# Patient Record
Sex: MALE | Race: OTHER | Employment: OTHER | URBAN - METROPOLITAN AREA
[De-identification: names, ages, dates, MRNs, and addresses within clinical notes are randomized per-mention and may not be internally consistent; named-entity substitution may affect disease eponyms.]

---

## 2020-01-22 ENCOUNTER — HOSPITAL ENCOUNTER (INPATIENT)
Facility: HOSPITAL | Age: 85
LOS: 5 days | Discharge: HOME HEALTH | DRG: 377 | End: 2020-01-27
Attending: EMERGENCY MEDICINE | Admitting: HOSPITALIST
Payer: MEDICARE

## 2020-01-22 DIAGNOSIS — K92.2 GASTROINTESTINAL HEMORRHAGE, UNSPECIFIED GASTROINTESTINAL HEMORRHAGE TYPE: Primary | ICD-10-CM

## 2020-01-22 LAB
ANION GAP SERPL CALC-SCNC: 5 MMOL/L (ref 0–18)
ANTIBODY SCREEN: NEGATIVE
APTT PPP: 32.9 SECONDS (ref 23.2–35.3)
BASOPHILS # BLD AUTO: 0.03 X10(3) UL (ref 0–0.2)
BASOPHILS NFR BLD AUTO: 0.5 %
BUN BLD-MCNC: 32 MG/DL (ref 7–18)
BUN/CREAT SERPL: 24.2 (ref 10–20)
CALCIUM BLD-MCNC: 8.5 MG/DL (ref 8.5–10.1)
CHLORIDE SERPL-SCNC: 113 MMOL/L (ref 98–112)
CO2 SERPL-SCNC: 26 MMOL/L (ref 21–32)
CREAT BLD-MCNC: 1.32 MG/DL (ref 0.7–1.3)
DEPRECATED RDW RBC AUTO: 44.1 FL (ref 35.1–46.3)
EOSINOPHIL # BLD AUTO: 0.09 X10(3) UL (ref 0–0.7)
EOSINOPHIL NFR BLD AUTO: 1.6 %
ERYTHROCYTE [DISTWIDTH] IN BLOOD BY AUTOMATED COUNT: 12.7 % (ref 11–15)
GLUCOSE BLD-MCNC: 90 MG/DL (ref 70–99)
HCT VFR BLD AUTO: 28.1 % (ref 39–53)
HGB BLD-MCNC: 9.3 G/DL (ref 13–17.5)
IMM GRANULOCYTES # BLD AUTO: 0.01 X10(3) UL (ref 0–1)
IMM GRANULOCYTES NFR BLD: 0.2 %
INR BLD: 1.19 (ref 0.9–1.2)
LYMPHOCYTES # BLD AUTO: 1.39 X10(3) UL (ref 1–4)
LYMPHOCYTES NFR BLD AUTO: 24.7 %
MCH RBC QN AUTO: 31.2 PG (ref 26–34)
MCHC RBC AUTO-ENTMCNC: 33.1 G/DL (ref 31–37)
MCV RBC AUTO: 94.3 FL (ref 80–100)
MONOCYTES # BLD AUTO: 0.56 X10(3) UL (ref 0.1–1)
MONOCYTES NFR BLD AUTO: 9.9 %
NEUTROPHILS # BLD AUTO: 3.55 X10 (3) UL (ref 1.5–7.7)
NEUTROPHILS # BLD AUTO: 3.55 X10(3) UL (ref 1.5–7.7)
NEUTROPHILS NFR BLD AUTO: 63.1 %
OSMOLALITY SERPL CALC.SUM OF ELEC: 304 MOSM/KG (ref 275–295)
PLATELET # BLD AUTO: 208 10(3)UL (ref 150–450)
POTASSIUM SERPL-SCNC: 3.9 MMOL/L (ref 3.5–5.1)
PROTHROMBIN TIME: 15 SECONDS (ref 11.8–14.5)
RBC # BLD AUTO: 2.98 X10(6)UL (ref 3.8–5.8)
RH BLOOD TYPE: POSITIVE
SODIUM SERPL-SCNC: 144 MMOL/L (ref 136–145)
WBC # BLD AUTO: 5.6 X10(3) UL (ref 4–11)

## 2020-01-22 PROCEDURE — 99223 1ST HOSP IP/OBS HIGH 75: CPT | Performed by: HOSPITALIST

## 2020-01-22 RX ORDER — SODIUM CHLORIDE 9 MG/ML
INJECTION, SOLUTION INTRAVENOUS CONTINUOUS
Status: DISCONTINUED | OUTPATIENT
Start: 2020-01-22 | End: 2020-01-24

## 2020-01-22 RX ORDER — ONDANSETRON 2 MG/ML
4 INJECTION INTRAMUSCULAR; INTRAVENOUS EVERY 6 HOURS PRN
Status: DISCONTINUED | OUTPATIENT
Start: 2020-01-22 | End: 2020-01-27

## 2020-01-22 RX ORDER — ZOLPIDEM TARTRATE 10 MG/1
10 TABLET ORAL NIGHTLY PRN
Status: DISCONTINUED | OUTPATIENT
Start: 2020-01-22 | End: 2020-01-22 | Stop reason: DRUGHIGH

## 2020-01-22 RX ORDER — SODIUM CHLORIDE 0.9 % (FLUSH) 0.9 %
3 SYRINGE (ML) INJECTION AS NEEDED
Status: DISCONTINUED | OUTPATIENT
Start: 2020-01-22 | End: 2020-01-27

## 2020-01-22 RX ORDER — LOSARTAN POTASSIUM 100 MG/1
100 TABLET ORAL DAILY
Status: ON HOLD | COMMUNITY
End: 2020-01-30

## 2020-01-22 RX ORDER — ACETAMINOPHEN 325 MG/1
650 TABLET ORAL EVERY 6 HOURS PRN
Status: DISCONTINUED | OUTPATIENT
Start: 2020-01-22 | End: 2020-01-27

## 2020-01-22 RX ORDER — ALPRAZOLAM 0.5 MG/1
0.5 TABLET ORAL 3 TIMES DAILY PRN
Status: DISCONTINUED | OUTPATIENT
Start: 2020-01-22 | End: 2020-01-27

## 2020-01-22 RX ORDER — AMLODIPINE BESYLATE 5 MG/1
5 TABLET ORAL DAILY
COMMUNITY

## 2020-01-22 RX ORDER — ATORVASTATIN CALCIUM 10 MG/1
10 TABLET, FILM COATED ORAL DAILY
COMMUNITY

## 2020-01-22 RX ORDER — ZOLPIDEM TARTRATE 5 MG/1
5 TABLET ORAL NIGHTLY PRN
Status: DISCONTINUED | OUTPATIENT
Start: 2020-01-22 | End: 2020-01-27

## 2020-01-22 NOTE — ED INITIAL ASSESSMENT (HPI)
Patient was at [facility] last week. Patient was bleeding from rectum and was seen at the hospital there. Colonoscopy was performed but no definite conclusion. Patient weak, dizziness and lost of appetite.

## 2020-01-23 LAB
ALBUMIN SERPL-MCNC: 2.6 G/DL (ref 3.4–5)
ALBUMIN/GLOB SERPL: 0.9 {RATIO} (ref 1–2)
ALP LIVER SERPL-CCNC: 53 U/L (ref 45–117)
ALT SERPL-CCNC: 14 U/L (ref 16–61)
ANION GAP SERPL CALC-SCNC: 4 MMOL/L (ref 0–18)
AST SERPL-CCNC: 17 U/L (ref 15–37)
BASOPHILS # BLD AUTO: 0.02 X10(3) UL (ref 0–0.2)
BASOPHILS NFR BLD AUTO: 0.4 %
BILIRUB SERPL-MCNC: 0.3 MG/DL (ref 0.1–2)
BUN BLD-MCNC: 27 MG/DL (ref 7–18)
BUN/CREAT SERPL: 24.5 (ref 10–20)
C DIFF TOX B STL QL: NEGATIVE
CALCIUM BLD-MCNC: 7.9 MG/DL (ref 8.5–10.1)
CHLORIDE SERPL-SCNC: 116 MMOL/L (ref 98–112)
CO2 SERPL-SCNC: 26 MMOL/L (ref 21–32)
CREAT BLD-MCNC: 1.1 MG/DL (ref 0.7–1.3)
DEPRECATED HBV CORE AB SER IA-ACNC: 27.6 NG/ML (ref 30–530)
DEPRECATED RDW RBC AUTO: 44.6 FL (ref 35.1–46.3)
EOSINOPHIL # BLD AUTO: 0.05 X10(3) UL (ref 0–0.7)
EOSINOPHIL NFR BLD AUTO: 1.1 %
ERYTHROCYTE [DISTWIDTH] IN BLOOD BY AUTOMATED COUNT: 12.8 % (ref 11–15)
GLOBULIN PLAS-MCNC: 2.8 G/DL (ref 2.8–4.4)
GLUCOSE BLD-MCNC: 95 MG/DL (ref 70–99)
HCT VFR BLD AUTO: 23.9 % (ref 39–53)
HCT VFR BLD AUTO: 26.3 % (ref 39–53)
HGB BLD-MCNC: 7.3 G/DL (ref 13–17.5)
HGB BLD-MCNC: 7.9 G/DL (ref 13–17.5)
HGB BLD-MCNC: 8.9 G/DL (ref 13–17.5)
IMM GRANULOCYTES # BLD AUTO: 0.01 X10(3) UL (ref 0–1)
IMM GRANULOCYTES NFR BLD: 0.2 %
IRON SATURATION: 12 % (ref 20–50)
IRON SERPL-MCNC: 33 UG/DL (ref 65–175)
LYMPHOCYTES # BLD AUTO: 0.84 X10(3) UL (ref 1–4)
LYMPHOCYTES NFR BLD AUTO: 18.9 %
M PROTEIN MFR SERPL ELPH: 5.4 G/DL (ref 6.4–8.2)
MCH RBC QN AUTO: 31.6 PG (ref 26–34)
MCHC RBC AUTO-ENTMCNC: 33.1 G/DL (ref 31–37)
MCV RBC AUTO: 95.6 FL (ref 80–100)
MONOCYTES # BLD AUTO: 0.3 X10(3) UL (ref 0.1–1)
MONOCYTES NFR BLD AUTO: 6.7 %
NEUTROPHILS # BLD AUTO: 3.23 X10 (3) UL (ref 1.5–7.7)
NEUTROPHILS # BLD AUTO: 3.23 X10(3) UL (ref 1.5–7.7)
NEUTROPHILS NFR BLD AUTO: 72.7 %
OSMOLALITY SERPL CALC.SUM OF ELEC: 307 MOSM/KG (ref 275–295)
PLATELET # BLD AUTO: 163 10(3)UL (ref 150–450)
POTASSIUM SERPL-SCNC: 4.1 MMOL/L (ref 3.5–5.1)
RBC # BLD AUTO: 2.5 X10(6)UL (ref 3.8–5.8)
SODIUM SERPL-SCNC: 146 MMOL/L (ref 136–145)
TOTAL IRON BINDING CAPACITY: 268 UG/DL (ref 240–450)
TRANSFERRIN SERPL-MCNC: 180 MG/DL (ref 200–360)
WBC # BLD AUTO: 4.5 X10(3) UL (ref 4–11)

## 2020-01-23 PROCEDURE — 99222 1ST HOSP IP/OBS MODERATE 55: CPT | Performed by: INTERNAL MEDICINE

## 2020-01-23 PROCEDURE — 99233 SBSQ HOSP IP/OBS HIGH 50: CPT | Performed by: HOSPITALIST

## 2020-01-23 RX ORDER — AMLODIPINE BESYLATE 5 MG/1
5 TABLET ORAL DAILY
Status: DISCONTINUED | OUTPATIENT
Start: 2020-01-23 | End: 2020-01-27

## 2020-01-23 RX ORDER — TAMSULOSIN HYDROCHLORIDE 0.4 MG/1
0.4 CAPSULE ORAL DAILY
Status: DISCONTINUED | OUTPATIENT
Start: 2020-01-23 | End: 2020-01-27

## 2020-01-23 RX ORDER — SODIUM CHLORIDE 9 MG/ML
INJECTION, SOLUTION INTRAVENOUS ONCE
Status: COMPLETED | OUTPATIENT
Start: 2020-01-23 | End: 2020-01-23

## 2020-01-23 NOTE — ED NOTES
The patient is alert and oriented X 4, Armenian speaking, ambulatory but lightheaded and weak. Family remains at bedside. Patient will likely have a colonoscopy in the morning and is NPO at this time.  Orders for admission received,  patient is aware of plan

## 2020-01-23 NOTE — PROGRESS NOTES
[facility]    Progress Note    [place] Patient Status:  Inpatient    1935 MRN [id number]   Location [facility] 5SW/SE Attending Antonio Bolanos,*   Hosp Day # 1 PCP MD Alysia Garner and counseling pt and with his permission his family in room who translated for me about care        Results:     Lab Results   Component Value Date    WBC 4.5 01/23/2020    HGB 7.9 (L) 01/23/2020    HCT 23.9 (L) 01/23/2020    .0 01/23/2020    CREAT

## 2020-01-23 NOTE — PLAN OF CARE
Problem: Patient Centered Care  Goal: Patient preferences are identified and integrated in the patient's plan of care  Description  Interventions:  - What would you like us to know as we care for you?  Npo    - Provide timely, complete, and accurate infor

## 2020-01-23 NOTE — ED PROVIDER NOTES
Patient Seen in: [facility] AND [facility] Emergency Department      History   Patient presents with:  GI Bleeding    Stated Complaint: not feeling well    HPI    Patient is an 47-year-old Syriac-speaking male who presents with bright red blood per rectum x1 mo PERRL  Neck: supple, non tender  CV: RRR, no murmurs  Resp: CTAB, no wheezes or retractions  Ab: soft, nontender, no distension  Rectal: +red blood at rectum. Hemoccult positive.  No hemorrhoids or fissures  Extremities: FROM of all extremities, no cyanosis individual orders. ABORH (BLOOD TYPE)   ANTIBODY SCREEN   RAINBOW DRAW BLUE   RAINBOW DRAW LAVENDER   RAINBOW DRAW LIGHT GREEN   RAINBOW DRAW GOLD     EKG    Rate, intervals and axes as noted on EKG Report.   Rate: 76  Rhythm: Sinus Rhythm  Reading: no ST

## 2020-01-23 NOTE — H&P
[facility]    PATIENT'S NAME: Almaz Horton CEDRIC STOKES   ATTENDING PHYSICIAN: Addis Galarza MD   PATIENT ACCOUNT#:   [id number]    LOCATION:  [place] [address]  MEDICAL RECORD #:   [id number]       YOB: 1935  ADMISSION DATE:       0 systems negative. PHYSICAL EXAMINATION:    GENERAL:  Alert and oriented to time, place, and person. No acute distress. VITAL SIGNS:  Temperature 97.6, pulse 75, respiratory rate 20, blood pressure 138/98. Pulse ox 99% on room air.   HEENT:  Atraumat

## 2020-01-23 NOTE — CONSULTS
[address]   Gastroenterology Consultation Note     [place]  Patient Status:    Inpatient  Date of Admission:         1/22/2020, Hospital day #1  Attending:   Jhonny Dinh  PCP:     Marguerite Omalley MD    Re polyethylene glycol (GOLYTELY) solution 2,000 mL, 2,000 mL, Oral, Q10H  •  Normal Saline Flush 0.9 % injection 3 mL, 3 mL, Intravenous, PRN  •  0.9% NaCl infusion, , Intravenous, Continuous  •  acetaminophen (TYLENOL) tab 650 mg, 650 mg, Oral, Q6H PRN  • radiation seed placement who was admitted 1/22 after presenting to the ED with blood per rectum    Uncertain source of bleeding especially with his uncertain history of recent colonoscopy in [facility] in November.  Possibly malignancy, hemorrhoids, radiation p to prolonged hospital stay or surgical intervention. All questions were answered to the patient’s satisfaction. The patient elected to proceed with EGD with intervention [i.e. Biopsy, dilatation, control of bleeding, etc.] as indicated.     Discussed with eva

## 2020-01-24 ENCOUNTER — TELEPHONE (OUTPATIENT)
Dept: GASTROENTEROLOGY | Facility: CLINIC | Age: 85
End: 2020-01-24

## 2020-01-24 ENCOUNTER — ANESTHESIA EVENT (OUTPATIENT)
Dept: ENDOSCOPY | Facility: HOSPITAL | Age: 85
DRG: 377 | End: 2020-01-24
Payer: MEDICARE

## 2020-01-24 ENCOUNTER — MED REC SCAN ONLY (OUTPATIENT)
Dept: GASTROENTEROLOGY | Facility: CLINIC | Age: 85
End: 2020-01-24

## 2020-01-24 ENCOUNTER — ANESTHESIA (OUTPATIENT)
Dept: ENDOSCOPY | Facility: HOSPITAL | Age: 85
DRG: 377 | End: 2020-01-24
Payer: MEDICARE

## 2020-01-24 LAB
ANION GAP SERPL CALC-SCNC: 7 MMOL/L (ref 0–18)
BASOPHILS # BLD AUTO: 0.01 X10(3) UL (ref 0–0.2)
BASOPHILS # BLD AUTO: 0.02 X10(3) UL (ref 0–0.2)
BASOPHILS NFR BLD AUTO: 0.2 %
BASOPHILS NFR BLD AUTO: 0.4 %
BUN BLD-MCNC: 22 MG/DL (ref 7–18)
BUN/CREAT SERPL: 16.7 (ref 10–20)
CALCIUM BLD-MCNC: 7.9 MG/DL (ref 8.5–10.1)
CHLORIDE SERPL-SCNC: 115 MMOL/L (ref 98–112)
CO2 SERPL-SCNC: 24 MMOL/L (ref 21–32)
CREAT BLD-MCNC: 1.32 MG/DL (ref 0.7–1.3)
DEPRECATED RDW RBC AUTO: 43.6 FL (ref 35.1–46.3)
DEPRECATED RDW RBC AUTO: 43.8 FL (ref 35.1–46.3)
EOSINOPHIL # BLD AUTO: 0.03 X10(3) UL (ref 0–0.7)
EOSINOPHIL # BLD AUTO: 0.04 X10(3) UL (ref 0–0.7)
EOSINOPHIL NFR BLD AUTO: 0.6 %
EOSINOPHIL NFR BLD AUTO: 0.8 %
ERYTHROCYTE [DISTWIDTH] IN BLOOD BY AUTOMATED COUNT: 13 % (ref 11–15)
ERYTHROCYTE [DISTWIDTH] IN BLOOD BY AUTOMATED COUNT: 13 % (ref 11–15)
GLUCOSE BLD-MCNC: 110 MG/DL (ref 70–99)
HAV IGM SER QL: 1.8 MG/DL (ref 1.6–2.6)
HCT VFR BLD AUTO: 21.8 % (ref 39–53)
HCT VFR BLD AUTO: 23.5 % (ref 39–53)
HGB BLD-MCNC: 7.3 G/DL (ref 13–17.5)
HGB BLD-MCNC: 7.4 G/DL (ref 13–17.5)
HGB BLD-MCNC: 8.1 G/DL (ref 13–17.5)
IMM GRANULOCYTES # BLD AUTO: 0.01 X10(3) UL (ref 0–1)
IMM GRANULOCYTES # BLD AUTO: 0.01 X10(3) UL (ref 0–1)
IMM GRANULOCYTES NFR BLD: 0.2 %
IMM GRANULOCYTES NFR BLD: 0.2 %
LYMPHOCYTES # BLD AUTO: 0.93 X10(3) UL (ref 1–4)
LYMPHOCYTES # BLD AUTO: 1.01 X10(3) UL (ref 1–4)
LYMPHOCYTES NFR BLD AUTO: 18.5 %
LYMPHOCYTES NFR BLD AUTO: 20.7 %
MCH RBC QN AUTO: 31.2 PG (ref 26–34)
MCH RBC QN AUTO: 31.6 PG (ref 26–34)
MCHC RBC AUTO-ENTMCNC: 33.9 G/DL (ref 31–37)
MCHC RBC AUTO-ENTMCNC: 34.5 G/DL (ref 31–37)
MCV RBC AUTO: 91.8 FL (ref 80–100)
MCV RBC AUTO: 92 FL (ref 80–100)
MONOCYTES # BLD AUTO: 0.39 X10(3) UL (ref 0.1–1)
MONOCYTES # BLD AUTO: 0.52 X10(3) UL (ref 0.1–1)
MONOCYTES NFR BLD AUTO: 10.6 %
MONOCYTES NFR BLD AUTO: 7.8 %
NEUTROPHILS # BLD AUTO: 3.3 X10 (3) UL (ref 1.5–7.7)
NEUTROPHILS # BLD AUTO: 3.3 X10(3) UL (ref 1.5–7.7)
NEUTROPHILS # BLD AUTO: 3.64 X10 (3) UL (ref 1.5–7.7)
NEUTROPHILS # BLD AUTO: 3.64 X10(3) UL (ref 1.5–7.7)
NEUTROPHILS NFR BLD AUTO: 67.5 %
NEUTROPHILS NFR BLD AUTO: 72.5 %
OSMOLALITY SERPL CALC.SUM OF ELEC: 306 MOSM/KG (ref 275–295)
PHOSPHATE SERPL-MCNC: 1.8 MG/DL (ref 2.5–4.9)
PLATELET # BLD AUTO: 159 10(3)UL (ref 150–450)
PLATELET # BLD AUTO: 165 10(3)UL (ref 150–450)
POTASSIUM SERPL-SCNC: 3.5 MMOL/L (ref 3.5–5.1)
RBC # BLD AUTO: 2.37 X10(6)UL (ref 3.8–5.8)
RBC # BLD AUTO: 2.56 X10(6)UL (ref 3.8–5.8)
SODIUM SERPL-SCNC: 146 MMOL/L (ref 136–145)
WBC # BLD AUTO: 4.9 X10(3) UL (ref 4–11)
WBC # BLD AUTO: 5 X10(3) UL (ref 4–11)

## 2020-01-24 PROCEDURE — 0DBP8ZX EXCISION OF RECTUM, VIA NATURAL OR ARTIFICIAL OPENING ENDOSCOPIC, DIAGNOSTIC: ICD-10-PCS | Performed by: INTERNAL MEDICINE

## 2020-01-24 PROCEDURE — 99233 SBSQ HOSP IP/OBS HIGH 50: CPT | Performed by: HOSPITALIST

## 2020-01-24 PROCEDURE — 45385 COLONOSCOPY W/LESION REMOVAL: CPT | Performed by: INTERNAL MEDICINE

## 2020-01-24 PROCEDURE — 0DB68ZX EXCISION OF STOMACH, VIA NATURAL OR ARTIFICIAL OPENING ENDOSCOPIC, DIAGNOSTIC: ICD-10-PCS | Performed by: INTERNAL MEDICINE

## 2020-01-24 PROCEDURE — 43239 EGD BIOPSY SINGLE/MULTIPLE: CPT | Performed by: INTERNAL MEDICINE

## 2020-01-24 RX ORDER — NALOXONE HYDROCHLORIDE 0.4 MG/ML
80 INJECTION, SOLUTION INTRAMUSCULAR; INTRAVENOUS; SUBCUTANEOUS AS NEEDED
Status: DISCONTINUED | OUTPATIENT
Start: 2020-01-24 | End: 2020-01-24 | Stop reason: HOSPADM

## 2020-01-24 RX ORDER — SODIUM CHLORIDE, SODIUM LACTATE, POTASSIUM CHLORIDE, CALCIUM CHLORIDE 600; 310; 30; 20 MG/100ML; MG/100ML; MG/100ML; MG/100ML
INJECTION, SOLUTION INTRAVENOUS CONTINUOUS
Status: DISCONTINUED | OUTPATIENT
Start: 2020-01-24 | End: 2020-01-24

## 2020-01-24 RX ORDER — LIDOCAINE HYDROCHLORIDE 10 MG/ML
INJECTION, SOLUTION EPIDURAL; INFILTRATION; INTRACAUDAL; PERINEURAL AS NEEDED
Status: DISCONTINUED | OUTPATIENT
Start: 2020-01-24 | End: 2020-01-24 | Stop reason: SURG

## 2020-01-24 RX ORDER — SODIUM CHLORIDE, SODIUM LACTATE, POTASSIUM CHLORIDE, CALCIUM CHLORIDE 600; 310; 30; 20 MG/100ML; MG/100ML; MG/100ML; MG/100ML
INJECTION, SOLUTION INTRAVENOUS CONTINUOUS PRN
Status: DISCONTINUED | OUTPATIENT
Start: 2020-01-24 | End: 2020-01-24

## 2020-01-24 RX ORDER — MAGNESIUM OXIDE 400 MG (241.3 MG MAGNESIUM) TABLET
400 TABLET ONCE
Status: COMPLETED | OUTPATIENT
Start: 2020-01-24 | End: 2020-01-24

## 2020-01-24 RX ADMIN — SODIUM CHLORIDE: 9 INJECTION, SOLUTION INTRAVENOUS at 09:06:00

## 2020-01-24 RX ADMIN — LIDOCAINE HYDROCHLORIDE 80 MG: 10 INJECTION, SOLUTION EPIDURAL; INFILTRATION; INTRACAUDAL; PERINEURAL at 08:23:00

## 2020-01-24 NOTE — PLAN OF CARE
Patient admitted for GI bleed. Patient scheduled for colonoscopy this morning. Patient had 2000ml of Golytely last night and finished 1000ml this am. Patient states his stomach feels full and he cannot drink anymore.  Patient had small BM with some pink/red

## 2020-01-24 NOTE — TELEPHONE ENCOUNTER
GI staff:    Patient hospitalized but likely going home today    Can call next week to schedule follow up in approximately 4-6 weeks    Thank you    Jesus Reyes MD  [facility] - Gastroenterology  1/24/2020  9:38 AM

## 2020-01-24 NOTE — TELEPHONE ENCOUNTER
Pt contacted in hospital room (ext [phone]) and spoke to his daughter, Geri Ramey (OK per pt consent) and reviewed Dr. Russell Wagoner message below.  She accepted the following appt, directions provided to [facility], and told to arrive 15 mins earlier:  Future Appointments   Date

## 2020-01-24 NOTE — PLAN OF CARE
Problem: Patient Centered Care  Goal: Patient preferences are identified and integrated in the patient's plan of care  Description  Interventions:  - What would you like us to know as we care for you?   - Provide timely, complete, and accurate informatio appropriate  Outcome: Progressing     Problem: RISK FOR INFECTION - ADULT  Goal: Absence of fever/infection during anticipated neutropenic period  Description  INTERVENTIONS  - Monitor WBC  - Administer growth factors as ordered  - Implement neutropenic gu care  Description  Interventions:  - Assess communication ability and preferred communication style  - Implement communication aides and strategies  - Use visual cues when possible  - Listen attentively, be patient, do not interrupt  - Minimize distraction

## 2020-01-24 NOTE — PROGRESS NOTES
[facility]    Progress Note    [place] Patient Status:  Inpatient    1935 MRN [id number]   Location [facility] 5SW/SE Attending Shalom Duke,*   Hosp Day # 2 PCP MD Argelia Blanco counseling pt and with his permission his family in room who translated for me about care          Results:     Lab Results   Component Value Date    WBC 5.0 01/24/2020    HGB 7.3 (L) 01/24/2020    HCT 23.5 (L) 01/24/2020    .0 01/24/2020    CREATSE

## 2020-01-24 NOTE — INTERVAL H&P NOTE
Pre-op Diagnosis: rectal bleeding    The above referenced H&P was reviewed by Emily Gonzalez MD on 1/24/2020, the patient was examined and no significant changes have occurred in the patient's condition since the H&P was performed.   I discussed with sri

## 2020-01-24 NOTE — CM/SW NOTE
Per nsg rounds pt may benefit from Inocencia Thrasher. Ref to [facility], Marce will eval    / to remain available for support and/or discharge planning.      Muriel Downing, RN    Ext [phone]

## 2020-01-24 NOTE — PROGRESS NOTES
Patient expressed that he is comfortable with upcoming procedure.  provided active listening and spiritual care. No additional follow up is needed.        01/23/20 4757   Clinical Encounter Type   Visited With Patient and family together   Routine

## 2020-01-24 NOTE — DIETARY NOTE
ADULT NUTRITION INITIAL ASSESSMENT    Pt is at high nutrition risk. Pt meets severe malnutrition criteria.       CRITERIA FOR MALNUTRITION DIAGNOSIS:  Criteria for severe malnutrition diagnosis: acute illness/injury related to wt loss greater than 5% in 1 PAST MEDICAL HISTORY:   Past Medical History:   Diagnosis Date   • Aortic stenosis    • Hypertension    • Lipid screening 4/29/09   • Prostate cancer [facility])    • Renal stones    • Screening PSA (prostate specific antigen) 5/9/09     ANTHROPOMETRICS:  HT: 18 reviewed.     NUTRITION PRESCRIPTION:  Diet: Full Liquid  Oral Supplements: Ensure Enlive BID  ESTIMATED NUTRITION NEEDS:  Calories: 1910-7626 calories/day (25-27 calories per kg Admit wt)  Protein: 82-98 grams protein/day (1.-1.2 grams protein per kg Admit

## 2020-01-24 NOTE — ANESTHESIA POSTPROCEDURE EVALUATION
Patient: Leona    Procedure Summary     Date:  01/24/20 Room / Location:  [address] [place] [address] ENDOSCOPY 05 / [address] [place] [address] ENDOSCOPY    Anesthesia Start:  0820 Anesthesia Stop:  1322    Procedures:       COLONOSCOPY (N/A )      ESOPHAGOGASTRODUODENOSCOPY (EGD) (N/A

## 2020-01-24 NOTE — ANESTHESIA PREPROCEDURE EVALUATION
Anesthesia PreOp Note    HPI:     Chrissy King is a 82year old male who presents for preoperative consultation requested by: Karma Mcwilliams MD    Date of Surgery: 1/22/2020 - 1/24/2020    Procedure(s):  COLONOSCOPY  ESOPHAGOGASTRODUODENOSCOP 40 mEq in sodium chloride 0.9% 250 mL IVPB, 40 mEq, Intravenous, Once, Jovan Flynn MD  potassium & sodium phosphates (NEUTRA-PHOS) 280-160-250 MG powder packet 1 packet, 1 packet, Oral, TID CC, Rina, Romain Remy MD  amLODIPine Besylate Alcohol use: No        Alcohol/week: 0.0 standard drinks      Drug use: No        Comment: No history of illicit substance abuse      Sexual activity: Not on file    Lifestyle      Physical activity:        Days per week: Not on file        Minutes per ses °F (36.5 °C)    TempSrc:  Oral Oral    SpO2:  95% 99% 94%   Weight:    81.8 kg (180 lb 5.4 oz)   Height:    1.829 m (6')        Anesthesia Evaluation     Patient summary reviewed and Nursing notes reviewed    History of anesthetic complications   Airway

## 2020-01-24 NOTE — OPERATIVE REPORT
Esophagogastroduodenoscopy (EGD) & Colonoscopy Report    [place]     1935 Age 82year old   PCP Melissa Blanc MD Endoscopist Mariam Ceballos MD     Date of procedure: 20    Procedure: EGD w/ biopsy & Colonoscopy w/ biopsy for insufflation). The cecum was identified by localizing the trifold, the appendix and the ileocecal valve. Withdrawal was begun with thorough washing and careful examination of the colonic walls and folds.  The ascending colon was viewed twice in the forw hospitalization for GI bleeding. There was a large 4-5 cm laterally spreading polyp in the rectum which did not appear to be highly suggestive of malignancy. There may have been a more central flatter part to it but did not appeared hard or ulcerated.  It w

## 2020-01-25 ENCOUNTER — APPOINTMENT (OUTPATIENT)
Dept: CV DIAGNOSTICS | Facility: HOSPITAL | Age: 85
DRG: 377 | End: 2020-01-25
Attending: HOSPITALIST
Payer: MEDICARE

## 2020-01-25 ENCOUNTER — APPOINTMENT (OUTPATIENT)
Dept: NUCLEAR MEDICINE | Facility: HOSPITAL | Age: 85
DRG: 377 | End: 2020-01-25
Attending: INTERNAL MEDICINE
Payer: MEDICARE

## 2020-01-25 LAB
ANION GAP SERPL CALC-SCNC: 6 MMOL/L (ref 0–18)
BASOPHILS # BLD AUTO: 0.01 X10(3) UL (ref 0–0.2)
BASOPHILS NFR BLD AUTO: 0.2 %
BLOOD TYPE BARCODE: 7300
BUN BLD-MCNC: 13 MG/DL (ref 7–18)
BUN/CREAT SERPL: 12.7 (ref 10–20)
CALCIUM BLD-MCNC: 8.2 MG/DL (ref 8.5–10.1)
CHLORIDE SERPL-SCNC: 114 MMOL/L (ref 98–112)
CO2 SERPL-SCNC: 25 MMOL/L (ref 21–32)
CREAT BLD-MCNC: 1.02 MG/DL (ref 0.7–1.3)
DEPRECATED RDW RBC AUTO: 43.6 FL (ref 35.1–46.3)
EOSINOPHIL # BLD AUTO: 0.03 X10(3) UL (ref 0–0.7)
EOSINOPHIL NFR BLD AUTO: 0.6 %
ERYTHROCYTE [DISTWIDTH] IN BLOOD BY AUTOMATED COUNT: 13 % (ref 11–15)
GLUCOSE BLD-MCNC: 117 MG/DL (ref 70–99)
HAV IGM SER QL: 1.8 MG/DL (ref 1.6–2.6)
HCT VFR BLD AUTO: 23.1 % (ref 39–53)
HGB BLD-MCNC: 6.7 G/DL (ref 13–17.5)
HGB BLD-MCNC: 8.1 G/DL (ref 13–17.5)
HGB BLD-MCNC: 8.1 G/DL (ref 13–17.5)
IMM GRANULOCYTES # BLD AUTO: 0.02 X10(3) UL (ref 0–1)
IMM GRANULOCYTES NFR BLD: 0.4 %
LYMPHOCYTES # BLD AUTO: 0.77 X10(3) UL (ref 1–4)
LYMPHOCYTES NFR BLD AUTO: 14.7 %
MCH RBC QN AUTO: 32 PG (ref 26–34)
MCHC RBC AUTO-ENTMCNC: 35.1 G/DL (ref 31–37)
MCV RBC AUTO: 91.3 FL (ref 80–100)
MONOCYTES # BLD AUTO: 0.48 X10(3) UL (ref 0.1–1)
MONOCYTES NFR BLD AUTO: 9.1 %
NEUTROPHILS # BLD AUTO: 3.94 X10 (3) UL (ref 1.5–7.7)
NEUTROPHILS # BLD AUTO: 3.94 X10(3) UL (ref 1.5–7.7)
NEUTROPHILS NFR BLD AUTO: 75 %
OSMOLALITY SERPL CALC.SUM OF ELEC: 301 MOSM/KG (ref 275–295)
PHOSPHATE SERPL-MCNC: 2.6 MG/DL (ref 2.5–4.9)
PLATELET # BLD AUTO: 160 10(3)UL (ref 150–450)
POTASSIUM SERPL-SCNC: 3.3 MMOL/L (ref 3.5–5.1)
POTASSIUM SERPL-SCNC: 3.9 MMOL/L (ref 3.5–5.1)
RBC # BLD AUTO: 2.53 X10(6)UL (ref 3.8–5.8)
SODIUM SERPL-SCNC: 145 MMOL/L (ref 136–145)
WBC # BLD AUTO: 5.3 X10(3) UL (ref 4–11)

## 2020-01-25 PROCEDURE — 30233N1 TRANSFUSION OF NONAUTOLOGOUS RED BLOOD CELLS INTO PERIPHERAL VEIN, PERCUTANEOUS APPROACH: ICD-10-PCS | Performed by: HOSPITALIST

## 2020-01-25 PROCEDURE — 99232 SBSQ HOSP IP/OBS MODERATE 35: CPT | Performed by: INTERNAL MEDICINE

## 2020-01-25 PROCEDURE — 78278 ACUTE GI BLOOD LOSS IMAGING: CPT | Performed by: INTERNAL MEDICINE

## 2020-01-25 PROCEDURE — 93306 TTE W/DOPPLER COMPLETE: CPT | Performed by: HOSPITALIST

## 2020-01-25 PROCEDURE — 99233 SBSQ HOSP IP/OBS HIGH 50: CPT | Performed by: HOSPITALIST

## 2020-01-25 RX ORDER — SODIUM CHLORIDE 9 MG/ML
INJECTION, SOLUTION INTRAVENOUS ONCE
Status: COMPLETED | OUTPATIENT
Start: 2020-01-25 | End: 2020-01-25

## 2020-01-25 RX ORDER — POTASSIUM CHLORIDE 20 MEQ/1
40 TABLET, EXTENDED RELEASE ORAL EVERY 4 HOURS
Status: COMPLETED | OUTPATIENT
Start: 2020-01-25 | End: 2020-01-25

## 2020-01-25 RX ORDER — MAGNESIUM OXIDE 400 MG (241.3 MG MAGNESIUM) TABLET
400 TABLET ONCE
Status: COMPLETED | OUTPATIENT
Start: 2020-01-25 | End: 2020-01-25

## 2020-01-25 NOTE — PROGRESS NOTES
[facility] [address]     Gastroenterology Progress Note    Zulema Barrientos Patient Status:  Inpatient    1935 MRN [id number]   Location [facility] 5SW/SE Attending Inna Flynn Day # 3 PCP MAREN JENSEN rate and rhythm   Lung- Clear to auscultation bilaterally  Abdomen-Non-distended. Bowel sounds are present. There is no tenderness to palpation. There are no masses appreciated. Liver and spleen are not palpable. Skin- No jaundice. Warm and dry.   Ext

## 2020-01-25 NOTE — PROGRESS NOTES
[facility]    Progress Note    [place] Patient Status:  Inpatient    1935 MRN [id number]   Location [facility] 5SW/SE Attending Lily Robledo,*   Hosp Day # 3 PCP MD Refugio Rivera care           Results:     Lab Results   Component Value Date    WBC 5.3 01/25/2020    HGB 8.1 (L) 01/25/2020    HCT 23.1 (L) 01/25/2020    .0 01/25/2020    CREATSERUM 1.02 01/25/2020    BUN 13 01/25/2020     01/25/2020    K 3.3 (L) 01/25/202

## 2020-01-25 NOTE — PLAN OF CARE
Problem: Patient Centered Care  Goal: Patient preferences are identified and integrated in the patient's plan of care  Description  Interventions:  - What would you like us to know as we care for you? I speak [place] Islander.   - Provide timely, complete, and acc influences on pain and pain management  - Manage/alleviate anxiety  - Utilize distraction and/or relaxation techniques  - Monitor for opioid side effects  - Notify MD/LIP if interventions unsuccessful or patient reports new pain  - Anticipate increased kostas post-hospital services based on physician/LIP order or complex needs related to functional status, cognitive ability or social support system  Outcome: Progressing     Problem: Altered Communication/Language Barrier  Goal: Patient/Family is able to [employer]

## 2020-01-25 NOTE — PLAN OF CARE
Problem: Patient Centered Care  Goal: Patient preferences are identified and integrated in the patient's plan of care  Description  Interventions:  - What would you like us to know as we care for you?  I speak Irish  - Provide timely, complete, and accu on pain and pain management  - Manage/alleviate anxiety  - Utilize distraction and/or relaxation techniques  - Monitor for opioid side effects  - Notify MD/LIP if interventions unsuccessful or patient reports new pain  - Anticipate increased pain with acti services based on physician/LIP order or complex needs related to functional status, cognitive ability or social support system  Outcome: Progressing     Problem: Altered Communication/Language Barrier  Goal: Patient/Family is able to understand and partic

## 2020-01-26 LAB
ANION GAP SERPL CALC-SCNC: 4 MMOL/L (ref 0–18)
BASOPHILS # BLD AUTO: 0.02 X10(3) UL (ref 0–0.2)
BASOPHILS NFR BLD AUTO: 0.4 %
BLOOD TYPE BARCODE: 7300
BUN BLD-MCNC: 14 MG/DL (ref 7–18)
BUN/CREAT SERPL: 13.9 (ref 10–20)
CALCIUM BLD-MCNC: 8.1 MG/DL (ref 8.5–10.1)
CHLORIDE SERPL-SCNC: 115 MMOL/L (ref 98–112)
CO2 SERPL-SCNC: 26 MMOL/L (ref 21–32)
CREAT BLD-MCNC: 1.01 MG/DL (ref 0.7–1.3)
DEPRECATED RDW RBC AUTO: 44.6 FL (ref 35.1–46.3)
EOSINOPHIL # BLD AUTO: 0.08 X10(3) UL (ref 0–0.7)
EOSINOPHIL NFR BLD AUTO: 1.5 %
ERYTHROCYTE [DISTWIDTH] IN BLOOD BY AUTOMATED COUNT: 13.3 % (ref 11–15)
GLUCOSE BLD-MCNC: 103 MG/DL (ref 70–99)
HAV IGM SER QL: 1.8 MG/DL (ref 1.6–2.6)
HCT VFR BLD AUTO: 21.9 % (ref 39–53)
HGB BLD-MCNC: 7.4 G/DL (ref 13–17.5)
HGB BLD-MCNC: 7.9 G/DL (ref 13–17.5)
IMM GRANULOCYTES # BLD AUTO: 0.02 X10(3) UL (ref 0–1)
IMM GRANULOCYTES NFR BLD: 0.4 %
LYMPHOCYTES # BLD AUTO: 0.97 X10(3) UL (ref 1–4)
LYMPHOCYTES NFR BLD AUTO: 18.2 %
MCH RBC QN AUTO: 31 PG (ref 26–34)
MCHC RBC AUTO-ENTMCNC: 33.8 G/DL (ref 31–37)
MCV RBC AUTO: 91.6 FL (ref 80–100)
MONOCYTES # BLD AUTO: 0.64 X10(3) UL (ref 0.1–1)
MONOCYTES NFR BLD AUTO: 12 %
NEUTROPHILS # BLD AUTO: 3.6 X10 (3) UL (ref 1.5–7.7)
NEUTROPHILS # BLD AUTO: 3.6 X10(3) UL (ref 1.5–7.7)
NEUTROPHILS NFR BLD AUTO: 67.5 %
OSMOLALITY SERPL CALC.SUM OF ELEC: 301 MOSM/KG (ref 275–295)
PHOSPHATE SERPL-MCNC: 2.1 MG/DL (ref 2.5–4.9)
PLATELET # BLD AUTO: 170 10(3)UL (ref 150–450)
POTASSIUM SERPL-SCNC: 3.8 MMOL/L (ref 3.5–5.1)
RBC # BLD AUTO: 2.39 X10(6)UL (ref 3.8–5.8)
SODIUM SERPL-SCNC: 145 MMOL/L (ref 136–145)
WBC # BLD AUTO: 5.3 X10(3) UL (ref 4–11)

## 2020-01-26 PROCEDURE — 99233 SBSQ HOSP IP/OBS HIGH 50: CPT | Performed by: HOSPITALIST

## 2020-01-26 PROCEDURE — 99232 SBSQ HOSP IP/OBS MODERATE 35: CPT | Performed by: INTERNAL MEDICINE

## 2020-01-26 RX ORDER — POTASSIUM CHLORIDE 20 MEQ/1
40 TABLET, EXTENDED RELEASE ORAL ONCE
Status: COMPLETED | OUTPATIENT
Start: 2020-01-26 | End: 2020-01-26

## 2020-01-26 RX ORDER — MAGNESIUM OXIDE 400 MG (241.3 MG MAGNESIUM) TABLET
400 TABLET ONCE
Status: COMPLETED | OUTPATIENT
Start: 2020-01-26 | End: 2020-01-26

## 2020-01-26 NOTE — PLAN OF CARE
Received call from Nuclear Medicine MD, Dr. Dom Sarmiento, and read the results as no evidence of active bleeding. Placed paged to Dr. Ilan Marie to inform him regarding the results. Family made aware with Dr. Ilan Marie permission. They verbalized understanding.

## 2020-01-26 NOTE — HOME CARE LIAISON
Met with patient at the bedside, offered  phone , patient preferred family to interpret. Choice offered, patient is agreeable to [facility]. Residential brochure provided with contact information.  All questions addressed an

## 2020-01-26 NOTE — PROGRESS NOTES
[facility] [address] [address]     Gastroenterology Progress Note    Donnamaria Christiano Patient Status:  Inpatient    1935 MRN [id number]   Location [facility] 5SW/SE Attending Inna Flynn Day # 4 PCP MAREN JENSEN is no tenderness to palpation. There are no masses appreciated. Liver and spleen are not palpable. Skin- No jaundice. Warm and dry. Ext: No cyanosis, clubbing or edema is evident.    Neuro- Alert and interactive, and gross movements of extremities norm

## 2020-01-26 NOTE — PROGRESS NOTES
[facility]    Progress Note    [place] Patient Status:  Inpatient    1935 MRN [id number]   Location [facility] 5SW/SE Attending Jhonny Dinh,*   Hosp Day # 4 PCP MD Andrae Ward hypovolemia.   5,       Flow murmur checked echo poss as vs hyperdynamic ventricle from anemia recheck hgb >10     39 min spent on pt of which 20 min spent coordinating care with nurse/dr davis and counseling pt and with his permission his family in room wh

## 2020-01-26 NOTE — PLAN OF CARE
Problem: Patient Centered Care  Goal: Patient preferences are identified and integrated in the patient's plan of care  Description  Interventions:  - What would you like us to know as we care for you? \"I am Cambodian speaking. \"  - Provide timely, complet influences on pain and pain management  - Manage/alleviate anxiety  - Utilize distraction and/or relaxation techniques  - Monitor for opioid side effects  - Notify MD/LIP if interventions unsuccessful or patient reports new pain  - Anticipate increased kostas post-hospital services based on physician/LIP order or complex needs related to functional status, cognitive ability or social support system  Outcome: Progressing     Problem: Altered Communication/Language Barrier  Goal: Patient/Family is able to [employer]

## 2020-01-26 NOTE — PLAN OF CARE
The patient's hgb level this morning was 7.4. Dr. Morena Mckinley and Dr. Stef Sanchez both said that the patient is not ready for discharge. He will have another CBC drawn this afternoon. Magnesium, potassium and phosphorus replaced today.  Patient's has a lot of famil Resolution of GI bleed    Interventions:   - GI on consult  - NPO  - Possible egd/colonoscopy?  - IVF  - Follow POC  - Monitoring hgb levels  - Bleeding precautions  - See additional Care Plan goals for specific interventions   Outcome: Progressing     Pro Modify environment to reduce risk of injury  - Provide assistive devices as appropriate  - Consider OT/PT consult to assist with strengthening/mobility  - Encourage toileting schedule  Outcome: Progressing     Problem: DISCHARGE PLANNING  Goal: Discharge t

## 2020-01-27 ENCOUNTER — HOSPITAL ENCOUNTER (OUTPATIENT)
Facility: HOSPITAL | Age: 85
Setting detail: OBSERVATION
Discharge: HOME HEALTH | End: 2020-01-30
Attending: EMERGENCY MEDICINE | Admitting: HOSPITALIST
Payer: MEDICARE

## 2020-01-27 ENCOUNTER — TELEPHONE (OUTPATIENT)
Dept: GASTROENTEROLOGY | Facility: CLINIC | Age: 85
End: 2020-01-27

## 2020-01-27 ENCOUNTER — APPOINTMENT (OUTPATIENT)
Dept: GENERAL RADIOLOGY | Facility: HOSPITAL | Age: 85
End: 2020-01-27
Attending: EMERGENCY MEDICINE
Payer: MEDICARE

## 2020-01-27 ENCOUNTER — APPOINTMENT (OUTPATIENT)
Dept: CT IMAGING | Facility: HOSPITAL | Age: 85
End: 2020-01-27
Attending: EMERGENCY MEDICINE
Payer: MEDICARE

## 2020-01-27 VITALS
BODY MASS INDEX: 24.42 KG/M2 | OXYGEN SATURATION: 98 % | HEART RATE: 74 BPM | WEIGHT: 180.31 LBS | RESPIRATION RATE: 18 BRPM | DIASTOLIC BLOOD PRESSURE: 54 MMHG | HEIGHT: 72 IN | SYSTOLIC BLOOD PRESSURE: 135 MMHG | TEMPERATURE: 98 F

## 2020-01-27 DIAGNOSIS — D50.9 IRON DEFICIENCY ANEMIA, UNSPECIFIED IRON DEFICIENCY ANEMIA TYPE: Primary | ICD-10-CM

## 2020-01-27 DIAGNOSIS — R55 SYNCOPE AND COLLAPSE: Primary | ICD-10-CM

## 2020-01-27 DIAGNOSIS — R40.4 TRANSIENT ALTERATION OF AWARENESS: ICD-10-CM

## 2020-01-27 LAB
ANION GAP SERPL CALC-SCNC: 2 MMOL/L (ref 0–18)
BASOPHILS # BLD AUTO: 0.03 X10(3) UL (ref 0–0.2)
BASOPHILS NFR BLD AUTO: 0.5 %
BUN BLD-MCNC: 16 MG/DL (ref 7–18)
BUN/CREAT SERPL: 16 (ref 10–20)
CALCIUM BLD-MCNC: 8.3 MG/DL (ref 8.5–10.1)
CHLORIDE SERPL-SCNC: 113 MMOL/L (ref 98–112)
CO2 SERPL-SCNC: 29 MMOL/L (ref 21–32)
CREAT BLD-MCNC: 1 MG/DL (ref 0.7–1.3)
DEPRECATED RDW RBC AUTO: 45.2 FL (ref 35.1–46.3)
EOSINOPHIL # BLD AUTO: 0.11 X10(3) UL (ref 0–0.7)
EOSINOPHIL NFR BLD AUTO: 1.9 %
ERYTHROCYTE [DISTWIDTH] IN BLOOD BY AUTOMATED COUNT: 13.2 % (ref 11–15)
GLUCOSE BLD-MCNC: 97 MG/DL (ref 70–99)
HAV IGM SER QL: 1.9 MG/DL (ref 1.6–2.6)
HCT VFR BLD AUTO: 23 % (ref 39–53)
HGB BLD-MCNC: 7.6 G/DL (ref 13–17.5)
IMM GRANULOCYTES # BLD AUTO: 0.02 X10(3) UL (ref 0–1)
IMM GRANULOCYTES NFR BLD: 0.3 %
LYMPHOCYTES # BLD AUTO: 1.05 X10(3) UL (ref 1–4)
LYMPHOCYTES NFR BLD AUTO: 17.8 %
MCH RBC QN AUTO: 30.9 PG (ref 26–34)
MCHC RBC AUTO-ENTMCNC: 33 G/DL (ref 31–37)
MCV RBC AUTO: 93.5 FL (ref 80–100)
MONOCYTES # BLD AUTO: 0.58 X10(3) UL (ref 0.1–1)
MONOCYTES NFR BLD AUTO: 9.8 %
NEUTROPHILS # BLD AUTO: 4.1 X10 (3) UL (ref 1.5–7.7)
NEUTROPHILS # BLD AUTO: 4.1 X10(3) UL (ref 1.5–7.7)
NEUTROPHILS NFR BLD AUTO: 69.7 %
OSMOLALITY SERPL CALC.SUM OF ELEC: 299 MOSM/KG (ref 275–295)
PLATELET # BLD AUTO: 197 10(3)UL (ref 150–450)
POTASSIUM SERPL-SCNC: 4.1 MMOL/L (ref 3.5–5.1)
RBC # BLD AUTO: 2.46 X10(6)UL (ref 3.8–5.8)
SODIUM SERPL-SCNC: 144 MMOL/L (ref 136–145)
WBC # BLD AUTO: 5.9 X10(3) UL (ref 4–11)

## 2020-01-27 PROCEDURE — 99239 HOSP IP/OBS DSCHRG MGMT >30: CPT | Performed by: HOSPITALIST

## 2020-01-27 PROCEDURE — 99232 SBSQ HOSP IP/OBS MODERATE 35: CPT | Performed by: PHYSICIAN ASSISTANT

## 2020-01-27 PROCEDURE — 99220 INITIAL OBSERVATION CARE,LEVL III: CPT | Performed by: HOSPITALIST

## 2020-01-27 PROCEDURE — 71045 X-RAY EXAM CHEST 1 VIEW: CPT | Performed by: EMERGENCY MEDICINE

## 2020-01-27 PROCEDURE — 70450 CT HEAD/BRAIN W/O DYE: CPT | Performed by: EMERGENCY MEDICINE

## 2020-01-27 PROCEDURE — 30233N1 TRANSFUSION OF NONAUTOLOGOUS RED BLOOD CELLS INTO PERIPHERAL VEIN, PERCUTANEOUS APPROACH: ICD-10-PCS | Performed by: HOSPITALIST

## 2020-01-27 RX ORDER — DOCUSATE SODIUM 100 MG/1
100 CAPSULE, LIQUID FILLED ORAL 2 TIMES DAILY
Status: DISCONTINUED | OUTPATIENT
Start: 2020-01-27 | End: 2020-01-27

## 2020-01-27 RX ORDER — ONDANSETRON 2 MG/ML
4 INJECTION INTRAMUSCULAR; INTRAVENOUS ONCE
Status: COMPLETED | OUTPATIENT
Start: 2020-01-27 | End: 2020-01-27

## 2020-01-27 RX ORDER — ONDANSETRON 2 MG/ML
4 INJECTION INTRAMUSCULAR; INTRAVENOUS EVERY 6 HOURS PRN
Status: DISCONTINUED | OUTPATIENT
Start: 2020-01-27 | End: 2020-01-30

## 2020-01-27 RX ORDER — ACETAMINOPHEN 325 MG/1
650 TABLET ORAL EVERY 6 HOURS PRN
Qty: 1 TABLET | Refills: 0 | Status: ON HOLD | COMMUNITY
Start: 2020-01-27 | End: 2020-12-31

## 2020-01-27 RX ORDER — MELATONIN
325 2 TIMES DAILY WITH MEALS
Qty: 60 TABLET | Refills: 0 | Status: ON HOLD | OUTPATIENT
Start: 2020-01-27 | End: 2020-12-31

## 2020-01-27 RX ORDER — SODIUM CHLORIDE 9 MG/ML
INJECTION, SOLUTION INTRAVENOUS ONCE
Status: DISCONTINUED | OUTPATIENT
Start: 2020-01-27 | End: 2020-01-30

## 2020-01-27 RX ORDER — POLYETHYLENE GLYCOL 3350 17 G/17G
17 POWDER, FOR SOLUTION ORAL DAILY
Qty: 30 EACH | Refills: 0 | Status: SHIPPED | OUTPATIENT
Start: 2020-01-27

## 2020-01-27 RX ORDER — SODIUM CHLORIDE 9 MG/ML
INJECTION, SOLUTION INTRAVENOUS CONTINUOUS
Status: DISCONTINUED | OUTPATIENT
Start: 2020-01-27 | End: 2020-01-29

## 2020-01-27 RX ORDER — POLYETHYLENE GLYCOL 3350 17 G/17G
17 POWDER, FOR SOLUTION ORAL DAILY
Status: DISCONTINUED | OUTPATIENT
Start: 2020-01-27 | End: 2020-01-27

## 2020-01-27 RX ORDER — SODIUM CHLORIDE 0.9 % (FLUSH) 0.9 %
3 SYRINGE (ML) INJECTION AS NEEDED
Status: DISCONTINUED | OUTPATIENT
Start: 2020-01-27 | End: 2020-01-30

## 2020-01-27 RX ORDER — ACETAMINOPHEN 325 MG/1
650 TABLET ORAL EVERY 6 HOURS PRN
Status: DISCONTINUED | OUTPATIENT
Start: 2020-01-27 | End: 2020-01-30

## 2020-01-27 RX ORDER — PSEUDOEPHEDRINE HCL 30 MG
100 TABLET ORAL 2 TIMES DAILY PRN
Qty: 20 CAPSULE | Refills: 0 | Status: SHIPPED | OUTPATIENT
Start: 2020-01-27

## 2020-01-27 NOTE — TELEPHONE ENCOUNTER
Pt already has f/u appt scheduled:  Future Appointments   Date Time Provider Inna Roche   2/27/2020  4:00 PM [facility]

## 2020-01-27 NOTE — PROGRESS NOTES
01/27/20 1300      Method of Interpretation Translation line; [facility]  services    Translated To Discharge    Name/ID Savana Hernandez [id number]     Patient dc home. IV removed. Understands to follow up with PCP in 1 week.

## 2020-01-27 NOTE — ED PROVIDER NOTES
Patient Seen in: [facility] AND [facility] Emergency Department      History   Patient presents with:  Seizures    Stated Complaint: seizure    HPI    74-year-old male presents via EMS for 2 possible seizures.   Patient was just discharged from the hospital and w Resp 18   Temp 97.4 °F (36.3 °C)   Temp src Oral   SpO2 98 %   O2 Device None (Room air)       Current:/57 (BP Location: Right arm)   Pulse 72   Temp 97.4 °F (36.3 °C) (Oral)   Resp 16   Ht 182.9 cm (6')   Wt 81.6 kg   SpO2 100%   BMI 24.41 kg/m² the following components:    RBC 2.50 (*)     HGB 7.8 (*)     HCT 23.4 (*)     All other components within normal limits   MAGNESIUM - Normal   TROPONIN I - Normal   CBC WITH DIFFERENTIAL WITH PLATELET    Narrative:      The following orders were created fo control for dose reduction was used. Dose information was transmitted to the [facility]) [facility] which includes the Dose Index Registry.    FINDINGS:  CSF SPACES: The ventricles, cisterns, and sulci a aorta.  MEDIAST/ANIA:   No visible mass or adenopathy. LUNGS/PLEURA: Normal.  No significant pulmonary parenchymal abnormalities. No effusion or pleural thickening. BONES: Mild degenerative disc disease and spondylosis without visible acute abnormalities.

## 2020-01-27 NOTE — ED INITIAL ASSESSMENT (HPI)
Pt on the way home from [address] [place] [address] after being discharged following a few days admitted for GI bleed. Had colonoscopy during admission. Daughter reports while on the way home [address]  began to shake all over lasting just a few seconds but was altered after.  Had abiodun

## 2020-01-27 NOTE — PROGRESS NOTES
[facility] [address]     Gastroenterology Progress Note    Mennie Jimi Patient Status:  Inpatient    1935 MRN [id number]   Location [facility] 5SW/SE Attending Inna Flynn Heidi Day # 5 PCP MAREN JENSEN 50% of this 30 minute encounter was spent in face-to-face time with patient discussing hospitalization course thus far, findings and coordination of care + follow up.     ESAU Olivares [facility] Gastroenterology    Results:     Lab Results   [place]

## 2020-01-27 NOTE — DISCHARGE SUMMARY
Dc summary#[id number]  > 30 min spent on [address] [facility] Discharge Diagnoses: gi bleed    Lace+ Score: 50  59-90 High Risk  29-58 Medium Risk  0-28   Low Risk. TCM Follow-Up Recommendation:  LACE 29-58:  Moderate Risk of readmission after discharge from the

## 2020-01-27 NOTE — PLAN OF CARE
Problem: Patient Centered Care  Goal: Patient preferences are identified and integrated in the patient's plan of care  Description  Interventions:  - What would you like us to know as we care for you? \"I am Vietnamese speaking only. \"  - Provide timely, co social influences on pain and pain management  - Manage/alleviate anxiety  - Utilize distraction and/or relaxation techniques  - Monitor for opioid side effects  - Notify MD/LIP if interventions unsuccessful or patient reports new pain  - Anticipate increa post-hospital services based on physician/LIP order or complex needs related to functional status, cognitive ability or social support system  Outcome: Progressing     Problem: DISCHARGE PLANNING  Goal: Discharge to home or other facility with appropriate

## 2020-01-27 NOTE — CDS QUERY
Potential for Impaired Nutritional Status  DOCUMENTATION CLARIFICATION FORM  Dear Doctor:  Clinical information suggests potential for impaired nutritional status.  For accurate ICD-10-CM code assignment to reflect severity of illness and risk of mortality, 19-24.9 kg/m2 - WNL  IBW: 178 lbs        101% IBW  Usual Body Wt: 200 lbs       90% UBW  NUTRITION RELATED PHYSICAL FINDINGS:  - Body Fat/Muscle Mass: moderate depletion body fat orbital region and moderate depletion muscle mass shoulder region per visual

## 2020-01-27 NOTE — TELEPHONE ENCOUNTER
I think that time frame for follow up in clinic is fine if/when he is being discharged. He will likely require cardiology evaluation given his echocardiogram and no prior cardiac evaluation.     At some point, if thought be at appropriate risk by cardiol

## 2020-01-27 NOTE — TELEPHONE ENCOUNTER
Dr. Deni Muhammad, I reviewed the PATH with the patient and his family today. I would recommend 6-8 week follow up and at that time, I can talk to him about repeating CLN - would you recommend 6-12 months? I am checking his CBC on Friday 1/31 (ordered).  GI

## 2020-01-27 NOTE — H&P
[facility]    PATIENT'S NAME: Belkys AGUILERA   ATTENDING PHYSICIAN: Foreign Russ MD   PATIENT ACCOUNT#:   [id number]    LOCATION:  [facility] [address] [place] [address]  MEDICAL RECORD #:   [id number]       YOB: 1935  ADMISSION DATE: tobacco, alcohol, or drug use. Lives with his family, usually independent in his basic activities of daily living. REVIEW OF SYSTEMS:  Patient feels overall fatigue.   While he was sitting with his family in the car after discharge from the hospital, h

## 2020-01-27 NOTE — CM/SW NOTE
MDO to KARIE for financial resources/assistance. KARIE provided pt with [facility] [place], [facility]. resources at bedside. Raghavendra Chun @ [facility] notified per pt request.      Plan: DC home w/ [facility].     MARIANELA Rogers, [facility]  [employer]   KOZ:#[id number]

## 2020-01-28 PROCEDURE — 99226 SUBSEQUENT OBSERVATION CARE: CPT | Performed by: HOSPITALIST

## 2020-01-28 RX ORDER — DOCUSATE SODIUM 100 MG/1
100 CAPSULE, LIQUID FILLED ORAL 2 TIMES DAILY PRN
Status: DISCONTINUED | OUTPATIENT
Start: 2020-01-28 | End: 2020-01-30

## 2020-01-28 RX ORDER — ATORVASTATIN CALCIUM 10 MG/1
10 TABLET, FILM COATED ORAL DAILY
Status: DISCONTINUED | OUTPATIENT
Start: 2020-01-28 | End: 2020-01-30

## 2020-01-28 RX ORDER — ZOLPIDEM TARTRATE 5 MG/1
10 TABLET ORAL NIGHTLY PRN
Status: DISCONTINUED | OUTPATIENT
Start: 2020-01-28 | End: 2020-01-30

## 2020-01-28 RX ORDER — TAMSULOSIN HYDROCHLORIDE 0.4 MG/1
0.4 CAPSULE ORAL DAILY
Status: DISCONTINUED | OUTPATIENT
Start: 2020-01-28 | End: 2020-01-30

## 2020-01-28 RX ORDER — ALPRAZOLAM 0.5 MG/1
0.5 TABLET ORAL 3 TIMES DAILY PRN
Status: DISCONTINUED | OUTPATIENT
Start: 2020-01-28 | End: 2020-01-30

## 2020-01-28 RX ORDER — MECLIZINE HYDROCHLORIDE 25 MG/1
25 TABLET ORAL 3 TIMES DAILY PRN
Status: DISCONTINUED | OUTPATIENT
Start: 2020-01-28 | End: 2020-01-30

## 2020-01-28 RX ORDER — ACETAMINOPHEN 325 MG/1
650 TABLET ORAL EVERY 6 HOURS PRN
Status: DISCONTINUED | OUTPATIENT
Start: 2020-01-28 | End: 2020-01-30

## 2020-01-28 NOTE — PLAN OF CARE
Problem: Patient Centered Care  Goal: Patient preferences are identified and integrated in the patient's plan of care  Description  Interventions:  - What would you like us to know as we care for you?  Just discharged today  - Provide timely, complete, an bedside to interpret  - Use visual cues when possible  - Listen attentively, be patient, do not interrupt  - Minimize distractions  - Allow time for understanding and response  - Establish method for patient to ask for assistance (call light)  - Provide an

## 2020-01-28 NOTE — PROGRESS NOTES
[facility]    Progress Note    [place] Patient Status:  Observation    1935 MRN [id number]   Location [facility] Attending Yaakov Flynn,*   Hosp Day # 0 PCP Jeannie Ponce MD        Subjective: need recheck echo when hgb 10 or more    40 min spent on pt of which 25 min spent coordinating care with nurse and counseling pt and with his permission his family in room about dc              Results:     Lab Results   Component Value Date    WBC 7.4 01/

## 2020-01-28 NOTE — PLAN OF CARE
Patient drowsybut alert and oriented this pm. 1 unit PRBS given. Patient was admitted yesterday and returned to ER after family noticed he was dizzy. Family does not seem happy about plan of care and asking how to switch hospitals tomorrow.  This RN educate communication aides and strategies family at bedside to interpret  - Use visual cues when possible  - Listen attentively, be patient, do not interrupt  - Minimize distractions  - Allow time for understanding and response  - Establish method for patient to

## 2020-01-28 NOTE — PLAN OF CARE
Problem: Patient Centered Care  Goal: Patient preferences are identified and integrated in the patient's plan of care  Description  Interventions:  - What would you like us to know as we care for you?  Just discharged today  - Provide timely, complete, an

## 2020-01-29 ENCOUNTER — APPOINTMENT (OUTPATIENT)
Dept: GENERAL RADIOLOGY | Facility: HOSPITAL | Age: 85
End: 2020-01-29
Attending: HOSPITALIST
Payer: MEDICARE

## 2020-01-29 PROCEDURE — 99226 SUBSEQUENT OBSERVATION CARE: CPT | Performed by: HOSPITALIST

## 2020-01-29 PROCEDURE — 95816 EEG AWAKE AND DROWSY: CPT | Performed by: OTHER

## 2020-01-29 PROCEDURE — 71046 X-RAY EXAM CHEST 2 VIEWS: CPT | Performed by: HOSPITALIST

## 2020-01-29 RX ORDER — POLYETHYLENE GLYCOL 3350 17 G/17G
17 POWDER, FOR SOLUTION ORAL DAILY
Status: DISCONTINUED | OUTPATIENT
Start: 2020-01-29 | End: 2020-01-30

## 2020-01-29 RX ORDER — MELATONIN
325
Status: DISCONTINUED | OUTPATIENT
Start: 2020-01-30 | End: 2020-01-30

## 2020-01-29 NOTE — DIETARY NOTE
ADULT NUTRITION INITIAL ASSESSMENT    Pt is at high nutrition risk. Pt meets severe malnutrition criteria.       CRITERIA FOR MALNUTRITION DIAGNOSIS:  Criteria for severe malnutrition diagnosis: acute illness/injury related to wt loss greater than 5% in 1 awareness [R40.4]  Syncope and collapse [R55]    PERTINENT PAST MEDICAL HISTORY:   Past Medical History:   Diagnosis Date   • Aortic stenosis    • Hypertension    • Lipid screening 4/29/09   • Prostate cancer [facility])    • Renal stones    • Screening PSA (pro Accumulation: none per RN documentation   - Skin Integrity: intact and RN documentation reviewed.     NUTRITION PRESCRIPTION:  Diet: Cardiac  Oral Supplements: Ensure BID  ESTIMATED NUTRITION NEEDS:  Calories: 4512-6254 calories/day (25-27 calories per kg A

## 2020-01-29 NOTE — PROGRESS NOTES
[facility]    Progress Note    [place] Patient Status:  Observation    1935 MRN [id number]   Location [facility] Attending Romain Flynn,*   Hosp Day # 0 PCP Rosalva Hawkins MD        Subjective: as will need recheck echo when hgb 10 or more    40 min spent on pt of which 25 min spent coordinating care with nurse and counseling pt and with his permission his family in room about dc              Results:     Lab Results   Component Value Date    WBC

## 2020-01-29 NOTE — PHYSICAL THERAPY NOTE
PHYSICAL THERAPY EVALUATION - INPATIENT     Room Number: 507/507-A  Evaluation Date: 1/29/2020  Type of Evaluation: Initial   Physician Order: PT Eval and Treat    Presenting Problem: syncope  Reason for Therapy: Mobility Dysfunction and Discharge Juanpablo List  Principal Problem:    Syncope and collapse  Active Problems:    Transient alteration of awareness      Past Medical History  Past Medical History:   Diagnosis Date   • Aortic stenosis    • Hypertension    • Lipid screening 4/29/09   • Prostate cancer AM-PAC '6-Clicks' INPATIENT SHORT FORM - BASIC MOBILITY  How much difficulty does the patient currently have. ..  -   Turning over in bed (including adjusting bedclothes, sheets and blankets)?: None   -   Sitting down on and standing up from a chair w

## 2020-01-29 NOTE — PLAN OF CARE
Problem: Patient Centered Care  Goal: Patient preferences are identified and integrated in the patient's plan of care  Description  Interventions:  - What would you like us to know as we care for you?  Just discharged today  - Provide timely, complete, an fall precautions as indicated by assessment.  - Educate pt/family on patient safety including physical limitations  - Instruct pt to call for assistance with activity based on assessment  - Modify environment to reduce risk of injury  - Provide assistive d Problem: NEUROLOGICAL - ADULT  Goal: Achieves stable or improved neurological status  Description  INTERVENTIONS  - Assess for and report changes in neurological status  - Initiate measures to prevent increased intracranial pressure  - Maintain blood pre

## 2020-01-29 NOTE — PLAN OF CARE
Patient denies CP or SOB. States his dizziness is better, explained use of meclizine and to notify RN or PCT if any dizziness occurs and to ambulate with assistance only. EEG planned for this morning.      Problem: Patient Centered Care  Goal: Patient prefe FALL  Goal: Free from fall injury  Description  INTERVENTIONS:  - Assess pt frequently for physical needs  - Identify cognitive and physical deficits and behaviors that affect risk of falls.   - [place] fall precautions as indicated by assessment.  - Educ electrolyte replacements, including rhythm and repeat lab results as appropriate  - Fluid restriction as ordered  - Instruct patient on fluid and nutrition restrictions as appropriate  Outcome: Progressing     Problem: NEUROLOGICAL - ADULT  Goal: Achieves

## 2020-01-29 NOTE — PROCEDURES
EEG report    REFERRING PHYSICIAN: Ángela Nolasco MD    PCP and phone number:  Rosalva Hawkins MD  [phone]    TECHNIQUE: 21 channels of EEG, 2 channels of EOG, and 1 channel of EKG were recorded utilizing the International 10/20 System.  The petar

## 2020-01-30 VITALS
HEART RATE: 85 BPM | OXYGEN SATURATION: 97 % | RESPIRATION RATE: 18 BRPM | SYSTOLIC BLOOD PRESSURE: 142 MMHG | TEMPERATURE: 98 F | WEIGHT: 181.13 LBS | BODY MASS INDEX: 24.53 KG/M2 | HEIGHT: 72 IN | DIASTOLIC BLOOD PRESSURE: 66 MMHG

## 2020-01-30 PROCEDURE — 99217 OBSERVATION CARE DISCHARGE: CPT | Performed by: NURSE PRACTITIONER

## 2020-01-30 NOTE — CM/SW NOTE
Pt is current with [facility], orders entered. 5146 Addendum: SW spoke to RN/Andree, C orders are entered, pt to dc today. Marce/[facility] has been notified of pt discharge. Plan: Home with wife and [facility] services - orders entered.      KARIE/HALEY to remain availa

## 2020-01-30 NOTE — PLAN OF CARE
Patient denies CP or SOB and denies any dizziness. Patient started oral iron. Plan to be discharged to home with home health PT.      Problem: Patient Centered Care  Goal: Patient preferences are identified and integrated in the patient's plan of care  Desc injury  Description  INTERVENTIONS:  - Assess pt frequently for physical needs  - Identify cognitive and physical deficits and behaviors that affect risk of falls.   - [place] fall precautions as indicated by assessment.  - Educate pt/family on patient sa electrolyte replacements, including rhythm and repeat lab results as appropriate  - Fluid restriction as ordered  - Instruct patient on fluid and nutrition restrictions as appropriate  Outcome: Adequate for Discharge     Problem: NEUROLOGICAL - ADULT  Goal

## 2020-01-30 NOTE — DISCHARGE SUMMARY
[facility]    PATIENT'S NAME: Rush Garaymayco AGUILERA   ATTENDING PHYSICIAN: Jovan Flynn MD   PATIENT ACCOUNT#:   [id number]    LOCATION:  [address] [place] [address] [address] RECORD #:   [id number]       YOB: 1935  ADMISSION DATE: rate 18, blood pressure is 135/54, 98%. LUNGS:  Clear. HEART:  Normal S1, S2. No S3.   ABDOMEN:  Soft. EXTREMITIES:  Without edema. NEUROLOGIC:  He is alert and oriented, friendly and cooperative. LABORATORY STUDIES:  Please see chart.      ASSE

## 2020-01-30 NOTE — DISCHARGE SUMMARY
[facility]    Discharge Summary    [place] Patient Status:  Observation    1935 MRN [id number]   Location [address] [facility] Attending No att. providers found   Hosp Day # 0 PCP Rosalva Hawkins MD     Date of Admiss and \"shaking\"   - eeg, normal   - hgb stable no bleeding   - will cont to hold bp meds, resume norvasc on d/c   - pt/ot, home health on d/c   - walker for home   2. Gi bleed from diverticulosis better actually constipated will give iron PO  3. htn  4.  Lo meals.   Quantity:  60 tablet  Refills:  0     PEG 3350 Pack  Commonly known as:  MIRALAX      Take 17 g by mouth daily. Quantity:  30 each  Refills:  0     tamsulosin HCl 0.4 MG Caps  Commonly known as:  FLOMAX      Take  by mouth daily.    Refills:  0

## 2020-01-31 ENCOUNTER — PATIENT OUTREACH (OUTPATIENT)
Dept: CASE MANAGEMENT | Age: 85
End: 2020-01-31

## 2020-01-31 NOTE — PROGRESS NOTES
NCM spoke with patient's daughter, Juan J Ramos (ok per verbal consent of patient) for hospital follow up. She states he is feeling better and denies any further GI bleeding concerns.   When NCM offered to schedule TCM/HFU with Dr. Juan Carlos Leon stated patien

## 2020-02-27 ENCOUNTER — OFFICE VISIT (OUTPATIENT)
Dept: GASTROENTEROLOGY | Facility: CLINIC | Age: 85
End: 2020-02-27
Payer: MEDICARE

## 2020-02-27 ENCOUNTER — TELEPHONE (OUTPATIENT)
Dept: GASTROENTEROLOGY | Facility: CLINIC | Age: 85
End: 2020-02-27

## 2020-02-27 VITALS
DIASTOLIC BLOOD PRESSURE: 72 MMHG | HEIGHT: 72 IN | SYSTOLIC BLOOD PRESSURE: 144 MMHG | HEART RATE: 67 BPM | WEIGHT: 191.81 LBS | BODY MASS INDEX: 25.98 KG/M2

## 2020-02-27 DIAGNOSIS — D12.8 ADENOMA OF RECTUM: ICD-10-CM

## 2020-02-27 DIAGNOSIS — D12.6 ADENOMATOUS POLYP OF COLON, UNSPECIFIED PART OF COLON: Primary | ICD-10-CM

## 2020-02-27 DIAGNOSIS — D12.8 RECTAL ADENOMA: Primary | ICD-10-CM

## 2020-02-27 DIAGNOSIS — D12.6 ADENOMATOUS POLYP OF COLON, UNSPECIFIED PART OF COLON: ICD-10-CM

## 2020-02-27 PROCEDURE — 99214 OFFICE O/P EST MOD 30 MIN: CPT | Performed by: INTERNAL MEDICINE

## 2020-02-27 RX ORDER — POLYETHYLENE GLYCOL 3350, SODIUM CHLORIDE, SODIUM BICARBONATE, POTASSIUM CHLORIDE 420; 11.2; 5.72; 1.48 G/4L; G/4L; G/4L; G/4L
POWDER, FOR SOLUTION ORAL
Qty: 1 BOTTLE | Refills: 0 | Status: ON HOLD | OUTPATIENT
Start: 2020-02-27 | End: 2020-12-31

## 2020-02-27 NOTE — PATIENT INSTRUCTIONS
1. Schedule colonoscopy with endoscopic mucosal resection with monitored anesthesia care (MAC) at the hospital with Dr. Aleida Bradford after cardiac evaluation    2.  bowel prep from pharmacy (split Trilyte )    3.  Continue all medications for procedure

## 2020-02-27 NOTE — PROGRESS NOTES
[facility] - Gastroenterology                                                                                                  Clinic Progress Note    Patient pr Karma Mcwilliams MD at [facility] ENDOSCOPY   • ESOPHAGOGASTRODUODENOSCOPY (EGD) N/A 1/24/2020    Performed by Karma Mcwilliams MD at [facility] ENDOSCOPY   • LITHOTRIPSY        Family Hx:   Family History   Problem Relation Age of Onset   • Infectious Disease Bro soft, non-tender, non-distended  Ext: no lower extremity swelling  Neuro: Alert, Oriented X 3  Skin: no rashes, bruises  Psych: normal affect    Labs/Imaging:     Patient's labs and imaging were reviewed and discussed with patient today.       .  ASSESSMENT with split trilyte after cardiac evaluation for his abnormal echocardiogram  -high fiber diet    Colonoscopy consent: I have discussed the risks, benefits, and alternatives (including stool testing like cologaurd) to colonoscopy with the patient [iona richards

## 2020-02-28 NOTE — TELEPHONE ENCOUNTER
Scheduled for:  Colonoscopy 19949 with EMR   Provider Name:  Abel Duffy  Date:  5/11/2020  Location[facility]  Sedation:  Mac  Time:  200 Pm ----------- Arrive 1130 Am  Prep:  Naz Capps/Allergies Reconciled?: Physician reviewed      Diagnosis with codes

## 2020-04-27 ENCOUNTER — TELEPHONE (OUTPATIENT)
Dept: GASTROENTEROLOGY | Facility: CLINIC | Age: 85
End: 2020-04-27

## 2020-04-27 DIAGNOSIS — D12.8 RECTAL ADENOMA: Primary | ICD-10-CM

## 2020-04-27 DIAGNOSIS — D12.6 ADENOMATOUS POLYP OF COLON, UNSPECIFIED PART OF COLON: ICD-10-CM

## 2020-08-04 ENCOUNTER — TELEPHONE (OUTPATIENT)
Dept: GASTROENTEROLOGY | Facility: CLINIC | Age: 85
End: 2020-08-04

## 2020-08-04 NOTE — TELEPHONE ENCOUNTER
PEG RX was sent to the pt's pharmacy on 2/27/2020, we do not send duplicate RX's as this med is already \"on hold\" at the pt pharmacy. Spoke to [facility] ([phone]) and confirmed the RX is on file and will fill for the patient now.  They will notify p

## 2020-08-06 NOTE — TELEPHONE ENCOUNTER
GI staff per insurance verification as of 8/1/20 pt has [employer] which the facility is not in network with. Please contact pt to cancel procedure and schedule with in-network provider.  Please advise  Per Referral # [id number]    ----- Message -----

## 2020-08-07 NOTE — TELEPHONE ENCOUNTER
Dr. Li Cha to pt daughter, aPola Cheng, and reviewed message from insurance verification and [employer] PA team message below.  She will check w/ insurance to see what changes have occurred but was agreeable to cancelling at this time to avoid OOP expenses if

## 2020-10-09 ENCOUNTER — TELEPHONE (OUTPATIENT)
Dept: GASTROENTEROLOGY | Facility: CLINIC | Age: 85
End: 2020-10-09

## 2020-10-09 DIAGNOSIS — D12.8 RECTAL ADENOMA: Primary | ICD-10-CM

## 2020-10-09 DIAGNOSIS — D12.6 ADENOMATOUS POLYP OF COLON, UNSPECIFIED PART OF COLON: ICD-10-CM

## 2020-10-13 RX ORDER — POLYETHYLENE GLYCOL 3350, SODIUM CHLORIDE, SODIUM BICARBONATE, POTASSIUM CHLORIDE 420; 11.2; 5.72; 1.48 G/4L; G/4L; G/4L; G/4L
POWDER, FOR SOLUTION ORAL
Qty: 1 BOTTLE | Refills: 0 | Status: ON HOLD | OUTPATIENT
Start: 2020-10-13 | End: 2020-12-31

## 2020-10-13 NOTE — TELEPHONE ENCOUNTER
Scheduled for:  Colonoscopy 25596 with EMR  Provider Name:  Dr Jeromy Rodriguez  Date:  12/14/2020  Location:  [facility]  Sedation:  MAC  Time:  1:30pm (pt is aware to arrive at 12:30pm)  Prep:  Trilyte  Meds/Allergies Reconciled?:  Physician reviewed  Diagnosis with c

## 2020-10-13 NOTE — TELEPHONE ENCOUNTER
hemanthlyte prescribed    Thanks    Isak Gilman MD  [facility] - Gastroenterology  10/13/2020  4:23 PM

## 2020-10-13 NOTE — TELEPHONE ENCOUNTER
Dr Catarina Guzman- Patient is scheduled for 12/14/2020, per daughter they no longer have previous Trilyte. Can you please re send to pharmacy.      Thank you

## 2020-11-30 ENCOUNTER — TELEPHONE (OUTPATIENT)
Dept: GASTROENTEROLOGY | Facility: CLINIC | Age: 85
End: 2020-11-30

## 2020-11-30 DIAGNOSIS — D36.9 ADENOMATOUS POLYPS: ICD-10-CM

## 2020-11-30 DIAGNOSIS — D12.8 RECTAL ADENOMA: Primary | ICD-10-CM

## 2020-11-30 NOTE — TELEPHONE ENCOUNTER
Pt's daughter Zenaida Jay has questions about her father's procedure that is on 12-14.  Please call [phone]

## 2020-11-30 NOTE — TELEPHONE ENCOUNTER
# [id number]    Daughter David Winter (not on HIPAA) states patient is anxious about upcoming procedure and has questions regarding his cardiac clearance. .I am unable to speak with daughter due to HIPAA.    Daughter states to call her father directly at 10

## 2020-12-01 NOTE — TELEPHONE ENCOUNTER
Verbal consent received from pt to discuss his care with daughter, Durene Severin. LMTCB-CSS PLS TRANSFER TO GI RN, TY!

## 2020-12-02 NOTE — TELEPHONE ENCOUNTER
Call transferred to RN by Fred 94 to pt daughter, [facility] per pt consent) and states the pt does not have a cardiologist but did have repeat cardiac testing with PCP, Dr. Kenn Elena, and provided clearance to undergo CLN w/EMR on 12/14/2020.  I revie

## 2020-12-07 NOTE — TELEPHONE ENCOUNTER
Spoke to Candice Wills RN/Dr. Lundberg's office ([phone]) to f/u on the letter for cardiac clearance. She states she d/w with PCP and he will not provide cardiac clearance and pt needs to schedule another appt with their office to further discuss.      Left messa

## 2020-12-08 NOTE — TELEPHONE ENCOUNTER
Dr. Christie Ramirez    Spoke to pt daughter, David Winter and informed of Dr. Dez Bailey recommendations. I reviewed pt needs to f/u with PCP AND a cardiologist ASAP. She states she will do so and will contact our office once cleared.      Pt removed from provider schedule a

## 2020-12-23 ENCOUNTER — HOSPITAL ENCOUNTER (INPATIENT)
Facility: HOSPITAL | Age: 85
LOS: 8 days | Discharge: NURSING, REHAB OR LONG-TERM CARE | DRG: 177 | End: 2020-12-31
Attending: EMERGENCY MEDICINE | Admitting: HOSPITALIST
Payer: MEDICARE

## 2020-12-23 ENCOUNTER — APPOINTMENT (OUTPATIENT)
Dept: GENERAL RADIOLOGY | Facility: HOSPITAL | Age: 85
DRG: 177 | End: 2020-12-23
Attending: EMERGENCY MEDICINE
Payer: MEDICARE

## 2020-12-23 DIAGNOSIS — J12.82 PNEUMONIA DUE TO COVID-19 VIRUS: Primary | ICD-10-CM

## 2020-12-23 DIAGNOSIS — R09.02 HYPOXIA: ICD-10-CM

## 2020-12-23 DIAGNOSIS — U07.1 PNEUMONIA DUE TO COVID-19 VIRUS: Primary | ICD-10-CM

## 2020-12-23 LAB
ALBUMIN SERPL-MCNC: 2.5 G/DL (ref 3.4–5)
ALBUMIN/GLOB SERPL: 0.6 {RATIO} (ref 1–2)
ALP LIVER SERPL-CCNC: 56 U/L
ALT SERPL-CCNC: 68 U/L
ANION GAP SERPL CALC-SCNC: 4 MMOL/L (ref 0–18)
AST SERPL-CCNC: 106 U/L (ref 15–37)
BACTERIA UR QL AUTO: NEGATIVE /HPF
BASOPHILS # BLD AUTO: 0.01 X10(3) UL (ref 0–0.2)
BASOPHILS NFR BLD AUTO: 0.2 %
BILIRUB SERPL-MCNC: 0.5 MG/DL (ref 0.1–2)
BILIRUB UR QL: NEGATIVE
BUN BLD-MCNC: 16 MG/DL (ref 7–18)
BUN/CREAT SERPL: 13.6 (ref 10–20)
CALCIUM BLD-MCNC: 8.5 MG/DL (ref 8.5–10.1)
CHLORIDE SERPL-SCNC: 106 MMOL/L (ref 98–112)
CHOLEST SMN-MCNC: 137 MG/DL (ref ?–200)
CK SERPL-CCNC: 443 U/L
CLARITY UR: CLEAR
CO2 SERPL-SCNC: 28 MMOL/L (ref 21–32)
COLOR UR: YELLOW
CREAT BLD-MCNC: 1.18 MG/DL
CRP SERPL-MCNC: 14.2 MG/DL (ref ?–0.3)
D DIMER PPP FEU-MCNC: 1.2 UG/ML FEU (ref ?–0.83)
DEPRECATED HBV CORE AB SER IA-ACNC: 2479.5 NG/ML
DEPRECATED RDW RBC AUTO: 43.8 FL (ref 35.1–46.3)
EOSINOPHIL # BLD AUTO: 0.01 X10(3) UL (ref 0–0.7)
EOSINOPHIL NFR BLD AUTO: 0.2 %
ERYTHROCYTE [DISTWIDTH] IN BLOOD BY AUTOMATED COUNT: 13.2 % (ref 11–15)
GLOBULIN PLAS-MCNC: 4 G/DL (ref 2.8–4.4)
GLUCOSE BLD-MCNC: 116 MG/DL (ref 70–99)
GLUCOSE UR-MCNC: NEGATIVE MG/DL
HCT VFR BLD AUTO: 35.8 %
HDLC SERPL-MCNC: 56 MG/DL (ref 40–59)
HGB BLD-MCNC: 12.4 G/DL
IMM GRANULOCYTES # BLD AUTO: 0.05 X10(3) UL (ref 0–1)
IMM GRANULOCYTES NFR BLD: 0.8 %
KETONES UR-MCNC: NEGATIVE MG/DL
LDH SERPL L TO P-CCNC: 550 U/L
LDLC SERPL CALC-MCNC: 58 MG/DL (ref ?–100)
LEUKOCYTE ESTERASE UR QL STRIP.AUTO: NEGATIVE
LYMPHOCYTES # BLD AUTO: 0.7 X10(3) UL (ref 1–4)
LYMPHOCYTES NFR BLD AUTO: 11.3 %
M PROTEIN MFR SERPL ELPH: 6.5 G/DL (ref 6.4–8.2)
MCH RBC QN AUTO: 31.6 PG (ref 26–34)
MCHC RBC AUTO-ENTMCNC: 34.6 G/DL (ref 31–37)
MCV RBC AUTO: 91.1 FL
MONOCYTES # BLD AUTO: 0.29 X10(3) UL (ref 0.1–1)
MONOCYTES NFR BLD AUTO: 4.7 %
NEUTROPHILS # BLD AUTO: 5.12 X10 (3) UL (ref 1.5–7.7)
NEUTROPHILS # BLD AUTO: 5.12 X10(3) UL (ref 1.5–7.7)
NEUTROPHILS NFR BLD AUTO: 82.8 %
NITRITE UR QL STRIP.AUTO: NEGATIVE
NONHDLC SERPL-MCNC: 81 MG/DL (ref ?–130)
NT-PROBNP SERPL-MCNC: 1120 PG/ML (ref ?–450)
OSMOLALITY SERPL CALC.SUM OF ELEC: 288 MOSM/KG (ref 275–295)
PH UR: 6 [PH] (ref 5–8)
PLATELET # BLD AUTO: 153 10(3)UL (ref 150–450)
POTASSIUM SERPL-SCNC: 3.5 MMOL/L (ref 3.5–5.1)
PROCALCITONIN SERPL-MCNC: 0.18 NG/ML (ref ?–0.16)
PROT UR-MCNC: >=500 MG/DL
RBC # BLD AUTO: 3.93 X10(6)UL
RBC #/AREA URNS AUTO: 2 /HPF
SODIUM SERPL-SCNC: 138 MMOL/L (ref 136–145)
SP GR UR STRIP: 1.01 (ref 1–1.03)
TRIGL SERPL-MCNC: 113 MG/DL (ref 30–149)
TROPONIN I SERPL-MCNC: 0.1 NG/ML (ref ?–0.04)
TROPONIN I SERPL-MCNC: 0.12 NG/ML (ref ?–0.04)
UROBILINOGEN UR STRIP-ACNC: <2
VLDLC SERPL CALC-MCNC: 23 MG/DL (ref 0–30)
WBC # BLD AUTO: 6.2 X10(3) UL (ref 4–11)
WBC #/AREA URNS AUTO: 2 /HPF

## 2020-12-23 PROCEDURE — 99223 1ST HOSP IP/OBS HIGH 75: CPT | Performed by: HOSPITALIST

## 2020-12-23 PROCEDURE — 71045 X-RAY EXAM CHEST 1 VIEW: CPT | Performed by: EMERGENCY MEDICINE

## 2020-12-23 PROCEDURE — 3E0333Z INTRODUCTION OF ANTI-INFLAMMATORY INTO PERIPHERAL VEIN, PERCUTANEOUS APPROACH: ICD-10-PCS | Performed by: HOSPITALIST

## 2020-12-23 RX ORDER — ACETAMINOPHEN 325 MG/1
650 TABLET ORAL EVERY 6 HOURS PRN
Status: DISCONTINUED | OUTPATIENT
Start: 2020-12-23 | End: 2020-12-31

## 2020-12-23 RX ORDER — ENOXAPARIN SODIUM 100 MG/ML
40 INJECTION SUBCUTANEOUS DAILY
Status: DISCONTINUED | OUTPATIENT
Start: 2020-12-23 | End: 2020-12-31

## 2020-12-23 RX ORDER — METOCLOPRAMIDE HYDROCHLORIDE 5 MG/ML
10 INJECTION INTRAMUSCULAR; INTRAVENOUS EVERY 8 HOURS PRN
Status: DISCONTINUED | OUTPATIENT
Start: 2020-12-23 | End: 2020-12-31

## 2020-12-23 RX ORDER — DEXAMETHASONE SODIUM PHOSPHATE 4 MG/ML
6 VIAL (ML) INJECTION EVERY 24 HOURS
Status: DISCONTINUED | OUTPATIENT
Start: 2020-12-23 | End: 2020-12-24

## 2020-12-23 RX ORDER — DEXAMETHASONE SODIUM PHOSPHATE 4 MG/ML
6 VIAL (ML) INJECTION DAILY
Status: DISCONTINUED | OUTPATIENT
Start: 2020-12-23 | End: 2020-12-23

## 2020-12-23 RX ORDER — DEXAMETHASONE SODIUM PHOSPHATE 10 MG/ML
6 INJECTION, SOLUTION INTRAMUSCULAR; INTRAVENOUS ONCE
Status: COMPLETED | OUTPATIENT
Start: 2020-12-23 | End: 2020-12-23

## 2020-12-23 RX ORDER — ONDANSETRON 2 MG/ML
4 INJECTION INTRAMUSCULAR; INTRAVENOUS EVERY 6 HOURS PRN
Status: DISCONTINUED | OUTPATIENT
Start: 2020-12-23 | End: 2020-12-31

## 2020-12-23 RX ORDER — ASPIRIN 81 MG/1
324 TABLET, CHEWABLE ORAL ONCE
Status: COMPLETED | OUTPATIENT
Start: 2020-12-23 | End: 2020-12-23

## 2020-12-23 NOTE — ED NOTES
Pt A&Ox3, tachepneic, labored breathing. Pt reports sob that started today, reports when ambulating. Pt reports he lives at home alone and family helps w/ essential needs, reports niece visited him today when she called ems. Pt reports pmhx htn and hld.  Pt

## 2020-12-23 NOTE — ED PROVIDER NOTES
Patient Seen in: [facility] AND [facility] Emergency Department    History   Patient presents with:  Cough/URI  Breathing Problem    Stated Complaint: cough, body aches, sob    HPI    Patient is here with complaint of cough congestion body aches fevers not feeli Take 17 g by mouth daily. amLODIPine Besylate 5 MG Oral Tab,  Take 5 mg by mouth daily. atorvastatin 10 MG Oral Tab,  Take 10 mg by mouth daily. tamsulosin HCl (FLOMAX) 0.4 MG Oral Cap,  Take  by mouth daily.    Zolpidem Tartrate (AMBIEN) 10 MG Oral T done as well. Patient did much better on nasal cannula oxygen. I did discuss with Dr. Chris Martinez he accepted patient for admission. He did request pulmonary consultation I did speak with Dr. Monica Kovacs.   Also requested infectious disease consultation I did spe components:     (*)     All other components within normal limits   URINALYSIS WITH CULTURE REFLEX - Abnormal; Notable for the following components:    Blood Urine Small (*)     Protein Urine >=500 (*)     All other components within normal limits

## 2020-12-23 NOTE — H&P
[facility]    PATIENT'S NAME: Sherron AGUILERA   ATTENDING PHYSICIAN: Velma Mathis.  Sita Jarvis MD   PATIENT ACCOUNT#:   [id number]    LOCATION:  [place] [address]  MEDICAL RECORD #:   [id number]       YOB: 1935  ADMISSION DATE:       15 PHYSICAL EXAMINATION:    GENERAL:  Alert and oriented to time, place, and person. Moderate distress. VITAL SIGNS:  Temperature 98.9, pulse 86, respiratory rate 30, blood pressure 146/61, pulse ox 91% on room air. HEENT:  Atraumatic.   Oropharynx aayush

## 2020-12-23 NOTE — ED INITIAL ASSESSMENT (HPI)
Pt arrived via [id number] [facility], reports pt's niece called for cough and body aches, pt hypoxic at 85% roomair per ems, pt initiated on 4L NC. Pt reports x8 days w/ cough and +covid test result on Monday. Pt reports pmhx htn.

## 2020-12-24 ENCOUNTER — APPOINTMENT (OUTPATIENT)
Dept: CT IMAGING | Facility: HOSPITAL | Age: 85
DRG: 177 | End: 2020-12-24
Attending: INTERNAL MEDICINE
Payer: MEDICARE

## 2020-12-24 ENCOUNTER — APPOINTMENT (OUTPATIENT)
Dept: ULTRASOUND IMAGING | Facility: HOSPITAL | Age: 85
DRG: 177 | End: 2020-12-24
Attending: INTERNAL MEDICINE
Payer: MEDICARE

## 2020-12-24 LAB
ALBUMIN SERPL-MCNC: 2.5 G/DL (ref 3.4–5)
ALBUMIN/GLOB SERPL: 0.6 {RATIO} (ref 1–2)
ALP LIVER SERPL-CCNC: 61 U/L
ALT SERPL-CCNC: 73 U/L
ANION GAP SERPL CALC-SCNC: 5 MMOL/L (ref 0–18)
ANTIBODY SCREEN: NEGATIVE
AST SERPL-CCNC: 104 U/L (ref 15–37)
BASOPHILS # BLD AUTO: 0.01 X10(3) UL (ref 0–0.2)
BASOPHILS NFR BLD AUTO: 0.1 %
BILIRUB SERPL-MCNC: 0.5 MG/DL (ref 0.1–2)
BUN BLD-MCNC: 17 MG/DL (ref 7–18)
BUN/CREAT SERPL: 16 (ref 10–20)
CALCIUM BLD-MCNC: 8.7 MG/DL (ref 8.5–10.1)
CHLORIDE SERPL-SCNC: 107 MMOL/L (ref 98–112)
CO2 SERPL-SCNC: 27 MMOL/L (ref 21–32)
CREAT BLD-MCNC: 1.06 MG/DL
CRP SERPL-MCNC: 17.4 MG/DL (ref ?–0.3)
D DIMER PPP FEU-MCNC: 1.77 UG/ML FEU (ref ?–0.83)
DEPRECATED HBV CORE AB SER IA-ACNC: 2786.3 NG/ML
DEPRECATED RDW RBC AUTO: 43.4 FL (ref 35.1–46.3)
EOSINOPHIL # BLD AUTO: 0 X10(3) UL (ref 0–0.7)
EOSINOPHIL NFR BLD AUTO: 0 %
ERYTHROCYTE [DISTWIDTH] IN BLOOD BY AUTOMATED COUNT: 13.1 % (ref 11–15)
GLOBULIN PLAS-MCNC: 4.4 G/DL (ref 2.8–4.4)
GLUCOSE BLD-MCNC: 132 MG/DL (ref 70–99)
HCT VFR BLD AUTO: 37.3 %
HGB BLD-MCNC: 12.9 G/DL
IMM GRANULOCYTES # BLD AUTO: 0.08 X10(3) UL (ref 0–1)
IMM GRANULOCYTES NFR BLD: 1 %
LDH SERPL L TO P-CCNC: 553 U/L
LYMPHOCYTES # BLD AUTO: 0.57 X10(3) UL (ref 1–4)
LYMPHOCYTES NFR BLD AUTO: 7.2 %
M PROTEIN MFR SERPL ELPH: 6.9 G/DL (ref 6.4–8.2)
MCH RBC QN AUTO: 31.4 PG (ref 26–34)
MCHC RBC AUTO-ENTMCNC: 34.6 G/DL (ref 31–37)
MCV RBC AUTO: 90.8 FL
MONOCYTES # BLD AUTO: 0.32 X10(3) UL (ref 0.1–1)
MONOCYTES NFR BLD AUTO: 4.1 %
NEUTROPHILS # BLD AUTO: 6.89 X10 (3) UL (ref 1.5–7.7)
NEUTROPHILS # BLD AUTO: 6.89 X10(3) UL (ref 1.5–7.7)
NEUTROPHILS NFR BLD AUTO: 87.6 %
OSMOLALITY SERPL CALC.SUM OF ELEC: 291 MOSM/KG (ref 275–295)
PLATELET # BLD AUTO: 161 10(3)UL (ref 150–450)
POTASSIUM SERPL-SCNC: 3.7 MMOL/L (ref 3.5–5.1)
RBC # BLD AUTO: 4.11 X10(6)UL
RH BLOOD TYPE: POSITIVE
SARS-COV-2 RNA RESP QL NAA+PROBE: DETECTED
SODIUM SERPL-SCNC: 139 MMOL/L (ref 136–145)
WBC # BLD AUTO: 7.9 X10(3) UL (ref 4–11)

## 2020-12-24 PROCEDURE — 93970 EXTREMITY STUDY: CPT | Performed by: INTERNAL MEDICINE

## 2020-12-24 PROCEDURE — XW033E5 INTRODUCTION OF REMDESIVIR ANTI-INFECTIVE INTO PERIPHERAL VEIN, PERCUTANEOUS APPROACH, NEW TECHNOLOGY GROUP 5: ICD-10-PCS | Performed by: HOSPITALIST

## 2020-12-24 PROCEDURE — 99233 SBSQ HOSP IP/OBS HIGH 50: CPT | Performed by: HOSPITALIST

## 2020-12-24 PROCEDURE — XW033H5 INTRODUCTION OF TOCILIZUMAB INTO PERIPHERAL VEIN, PERCUTANEOUS APPROACH, NEW TECHNOLOGY GROUP 5: ICD-10-PCS | Performed by: HOSPITALIST

## 2020-12-24 PROCEDURE — 99221 1ST HOSP IP/OBS SF/LOW 40: CPT | Performed by: INTERNAL MEDICINE

## 2020-12-24 PROCEDURE — 71260 CT THORAX DX C+: CPT | Performed by: INTERNAL MEDICINE

## 2020-12-24 RX ORDER — SODIUM CHLORIDE 9 MG/ML
INJECTION, SOLUTION INTRAVENOUS ONCE
Status: DISCONTINUED | OUTPATIENT
Start: 2020-12-24 | End: 2020-12-31

## 2020-12-24 RX ORDER — DEXAMETHASONE 6 MG/1
6 TABLET ORAL EVERY EVENING
Status: DISCONTINUED | OUTPATIENT
Start: 2020-12-24 | End: 2020-12-31

## 2020-12-24 RX ORDER — AZITHROMYCIN 250 MG/1
500 TABLET, FILM COATED ORAL DAILY
Status: COMPLETED | OUTPATIENT
Start: 2020-12-24 | End: 2020-12-28

## 2020-12-24 RX ORDER — DOCUSATE SODIUM 100 MG/1
100 CAPSULE, LIQUID FILLED ORAL 2 TIMES DAILY PRN
Status: DISCONTINUED | OUTPATIENT
Start: 2020-12-24 | End: 2020-12-31

## 2020-12-24 RX ORDER — ATORVASTATIN CALCIUM 10 MG/1
10 TABLET, FILM COATED ORAL DAILY
Status: DISCONTINUED | OUTPATIENT
Start: 2020-12-24 | End: 2020-12-31

## 2020-12-24 RX ORDER — ALPRAZOLAM 0.5 MG/1
0.5 TABLET ORAL 3 TIMES DAILY PRN
Status: DISCONTINUED | OUTPATIENT
Start: 2020-12-24 | End: 2020-12-31

## 2020-12-24 RX ORDER — ZOLPIDEM TARTRATE 5 MG/1
10 TABLET ORAL NIGHTLY PRN
Status: DISCONTINUED | OUTPATIENT
Start: 2020-12-24 | End: 2020-12-24

## 2020-12-24 RX ORDER — POLYETHYLENE GLYCOL 3350 17 G/17G
17 POWDER, FOR SOLUTION ORAL DAILY
Status: DISCONTINUED | OUTPATIENT
Start: 2020-12-24 | End: 2020-12-31

## 2020-12-24 RX ORDER — ENOXAPARIN SODIUM 100 MG/ML
40 INJECTION SUBCUTANEOUS ONCE
Status: COMPLETED | OUTPATIENT
Start: 2020-12-24 | End: 2020-12-24

## 2020-12-24 RX ORDER — TAMSULOSIN HYDROCHLORIDE 0.4 MG/1
0.4 CAPSULE ORAL DAILY
Status: DISCONTINUED | OUTPATIENT
Start: 2020-12-24 | End: 2020-12-31

## 2020-12-24 RX ORDER — AMLODIPINE BESYLATE 5 MG/1
5 TABLET ORAL DAILY
Status: DISCONTINUED | OUTPATIENT
Start: 2020-12-24 | End: 2020-12-31

## 2020-12-24 RX ORDER — POTASSIUM CHLORIDE 1.5 G/1.77G
40 POWDER, FOR SOLUTION ORAL ONCE
Status: COMPLETED | OUTPATIENT
Start: 2020-12-24 | End: 2020-12-24

## 2020-12-24 RX ORDER — ZOLPIDEM TARTRATE 5 MG/1
5 TABLET ORAL NIGHTLY PRN
Status: DISCONTINUED | OUTPATIENT
Start: 2020-12-24 | End: 2020-12-31

## 2020-12-24 NOTE — CONSULTS
[facility] Patient Status:  Inpatient    1935 MRN [id number]   Location [facility]5 [facility] Attending Janine Flynn Day # 1 PCP Tabitha Gonzalez MD, Jeanine Almonte MD       Reas •  sodium chloride 0.9% IV bolus 500 mL, 500 mL, Intravenous, PRN  •  Enoxaparin Sodium (LOVENOX) 40 MG/0.4ML injection 40 mg, 40 mg, Subcutaneous, Daily  •  acetaminophen (TYLENOL) tab 650 mg, 650 mg, Oral, Q6H PRN  •  ondansetron HCl (ZOFRAN) injection 4 Antibiotics: IV ceftriaxone, azithromycin 0753    82year-old male with a history of prostate cancer status post radiation and brachytherapy, HTN, HLD, aortic stenosis now presents to hospital with cough, shortness of breath, fevers, myalgias, decreased a

## 2020-12-24 NOTE — PLAN OF CARE
Prema Michael has been requiring high flow oxygen today. RN attempted to have the patient prone without success. Dr. Iqra Holly is aware. Speech evaluated Teofilo for swallowing. He has been recommended to follow a ground diet with nectar thick liquids using a spoon. assessment.  - Educate pt/family on patient safety including physical limitations  - Instruct pt to call for assistance with activity based on assessment  - Modify environment to reduce risk of injury  - Provide assistive devices as appropriate  - Consider

## 2020-12-24 NOTE — CONSULTS
Pulmonary/Critical Care Consultation Note    HPI:   Jordyn Heard is a 82year old male with Patient presents with:  Cough/URI  Breathing Zoe Lee MD, Shad March MD    Pt is a 83 yo with h/o AS, HL, HTN, prostate cancer s/p xrt and brachythe Intravenous, Q24H    •  [COMPLETED] aspirin chewable tab 324 mg, 324 mg, Oral, Once            Allergies:  No Known Allergies    Social History:  Social History    Socioeconomic History      Marital status:        Spouse name: Not on file      Number COVID-19 prior to anti viral therapy   IF + covid rec RDV, DEX, CCP   PPE   IS   O2    HTN  High  Plan cont home meds for now   Follow    HL  Plan statin    DVT px  SCDs  Lovenox    Pt is complex and high risk          Lincoln Quiros MD  12/24/2020  12:13

## 2020-12-24 NOTE — PROGRESS NOTES
[facility] Note: Route Optimization for Dexamethasone (Decadron)    Patient is currently on Dexamethasone (Decadron) 6 mg IV every 24 hours.    The patient meets the criteria to convert to the oral equivalent as established by the IV to Oral conversion pr

## 2020-12-24 NOTE — PROGRESS NOTES
[facility]    Progress Note    [place] Patient Status:  Inpatient    1935 MRN [id number]   Location [facility]5W Attending Inna Flynn Day # 1 PCP Mary Aguirre MD, Vic Tyson MD        Subjective: care                 Results:     Lab Results   Component Value Date    WBC 7.9 12/24/2020    HGB 12.9 (L) 12/24/2020    HCT 37.3 (L) 12/24/2020    .0 12/24/2020    CREATSERUM 1.06 12/24/2020    BUN 17 12/24/2020     12/24/2020    K 3.7 12/24/ hypertension. 6. Cholelithiasis without CT evidence of acute complication in the imaged volume. 7. Uncomplicated colonic diverticulosis. 8. Sequela of remote granulomatous disease. 9. Lesser incidental findings as above. Remote.    Dictated by (CST)

## 2020-12-24 NOTE — PLAN OF CARE
Problem: Patient Centered Care  Goal: Patient preferences are identified and integrated in the patient's plan of care  Description: Interventions:  - What would you like us to know as we care for you?  I speak Green[place]ic  - Provide timely, complete, and accu toileting schedule  Outcome: Progressing     Problem: RESPIRATORY - ADULT  Goal: Achieves optimal ventilation and oxygenation  Description: INTERVENTIONS:  - Assess for changes in respiratory status  - Assess for changes in mentation and behavior  - Positi

## 2020-12-24 NOTE — ED NOTES
Pt noted to have bruises to right arm, pt reports he had a fall at home couple days ago. Pt denies head trauma or loc. Pt proned to left side.

## 2020-12-24 NOTE — PROGRESS NOTES
Pt covid+ 12/21. Symptoms started 12/15  Currently on 6L NC SpO2 93%, afebrile. Inflammatory markers elevated - morning labs pending. CCP On hold  Actemra On hold  RDV day 1/5  Continue Lovenox daily and decadron IV. Pulmonology following.  ID consul

## 2020-12-24 NOTE — SLP NOTE
ADULT SWALLOWING EVALUATION    ASSESSMENT    ASSESSMENT/OVERALL IMPRESSION:  PT COVID-19 POSITIVE. CONTACT AND DROPLET ISOLATION PPE REQUIRED. THIS THERAPIST WORE GOWN, GLOVES, FACE SHIELD, AND DROPLET/N95 RESPIRATOR MASK.  HANDS SANITIZED/WASHED UPON ENTRA and 1:1 feed assist. SLP to f/u with meal assessment x1-2, assess for upgrade candidacy as overall respiratory status improves, and monitor CXR. VFSS as clinically indicated. Results and recommendations reviewed with RN and family.  SLP collaborated with RN Within Functional Limits  Strength: (OVERALL REDUCED)  Tone: (OVERALL REDUCED )  Range of Motion: (OVERALL REDUCED )  Rate of Motion: Reduced    Voice Quality: Weak  Respiratory Status: Supplemental O2;Nasal cannula  Consistencies Trialed: Thin liquids; Nec Needed: Yes  SLP Follow-up Date: 12/26/20    Thank you for your referral.   If you have any questions, please contact Edwin Pedraza M.S. [address] [facility] [place]  Speech Language Pathologist   Phone Number [phone]

## 2020-12-24 NOTE — PHYSICAL THERAPY NOTE
PHYSICAL THERAPY EVALUATION - INPATIENT     Room Number: 389/213-R  Evaluation Date: 12/24/2020  Type of Evaluation: Initial   Physician Order: PT Eval and Treat       Reason for Therapy: Mobility Dysfunction and Discharge Planning    PHYSICAL THERAPY ASS • Hypertension    • Lipid screening 4/29/09   • Prostate cancer [facility])    • Renal stones    • Screening PSA (prostate specific antigen) 5/9/09       Past Surgical History  Past Surgical History:   Procedure Laterality Date   • COLONOSCOPY N/A 1/24/2020 with a railing?: A Lot     AM-PAC Score:  Raw Score: 16   Approx Degree of Impairment: 54.16%   Standardized Score (AM-PAC Scale): 40.78   CMS Modifier (G-Code): CK    FUNCTIONAL ABILITY STATUS  Gait Assessment   Gait Assistance: Not tested

## 2020-12-25 LAB
ALBUMIN SERPL-MCNC: 2.3 G/DL (ref 3.4–5)
ALBUMIN/GLOB SERPL: 0.5 {RATIO} (ref 1–2)
ALP LIVER SERPL-CCNC: 63 U/L
ALT SERPL-CCNC: 68 U/L
ANION GAP SERPL CALC-SCNC: 5 MMOL/L (ref 0–18)
AST SERPL-CCNC: 83 U/L (ref 15–37)
BASOPHILS # BLD AUTO: 0.01 X10(3) UL (ref 0–0.2)
BASOPHILS NFR BLD AUTO: 0.2 %
BILIRUB SERPL-MCNC: 0.4 MG/DL (ref 0.1–2)
BUN BLD-MCNC: 27 MG/DL (ref 7–18)
BUN/CREAT SERPL: 22.5 (ref 10–20)
CALCIUM BLD-MCNC: 8.6 MG/DL (ref 8.5–10.1)
CHLORIDE SERPL-SCNC: 110 MMOL/L (ref 98–112)
CO2 SERPL-SCNC: 26 MMOL/L (ref 21–32)
CREAT BLD-MCNC: 1.2 MG/DL
CRP SERPL-MCNC: 13.5 MG/DL (ref ?–0.3)
D DIMER PPP FEU-MCNC: 1.6 UG/ML FEU (ref ?–0.83)
DEPRECATED HBV CORE AB SER IA-ACNC: 3218.8 NG/ML
DEPRECATED RDW RBC AUTO: 44.5 FL (ref 35.1–46.3)
EOSINOPHIL # BLD AUTO: 0 X10(3) UL (ref 0–0.7)
EOSINOPHIL NFR BLD AUTO: 0 %
ERYTHROCYTE [DISTWIDTH] IN BLOOD BY AUTOMATED COUNT: 13.3 % (ref 11–15)
GLOBULIN PLAS-MCNC: 4.6 G/DL (ref 2.8–4.4)
GLUCOSE BLD-MCNC: 133 MG/DL (ref 70–99)
HAV IGM SER QL: 2.2 MG/DL (ref 1.6–2.6)
HCT VFR BLD AUTO: 38.9 %
HGB BLD-MCNC: 13.3 G/DL
IMM GRANULOCYTES # BLD AUTO: 0.04 X10(3) UL (ref 0–1)
IMM GRANULOCYTES NFR BLD: 0.6 %
LDH SERPL L TO P-CCNC: 506 U/L
LYMPHOCYTES # BLD AUTO: 0.68 X10(3) UL (ref 1–4)
LYMPHOCYTES NFR BLD AUTO: 10.8 %
M PROTEIN MFR SERPL ELPH: 6.9 G/DL (ref 6.4–8.2)
MCH RBC QN AUTO: 31 PG (ref 26–34)
MCHC RBC AUTO-ENTMCNC: 34.2 G/DL (ref 31–37)
MCV RBC AUTO: 90.7 FL
MONOCYTES # BLD AUTO: 0.37 X10(3) UL (ref 0.1–1)
MONOCYTES NFR BLD AUTO: 5.9 %
NEUTROPHILS # BLD AUTO: 5.17 X10 (3) UL (ref 1.5–7.7)
NEUTROPHILS # BLD AUTO: 5.17 X10(3) UL (ref 1.5–7.7)
NEUTROPHILS NFR BLD AUTO: 82.5 %
OSMOLALITY SERPL CALC.SUM OF ELEC: 299 MOSM/KG (ref 275–295)
PHOSPHATE SERPL-MCNC: 3.2 MG/DL (ref 2.5–4.9)
PLATELET # BLD AUTO: 222 10(3)UL (ref 150–450)
POTASSIUM SERPL-SCNC: 3.9 MMOL/L (ref 3.5–5.1)
RBC # BLD AUTO: 4.29 X10(6)UL
SODIUM SERPL-SCNC: 141 MMOL/L (ref 136–145)
WBC # BLD AUTO: 6.3 X10(3) UL (ref 4–11)

## 2020-12-25 PROCEDURE — XW13325 TRANSFUSION OF CONVALESCENT PLASMA (NONAUTOLOGOUS) INTO PERIPHERAL VEIN, PERCUTANEOUS APPROACH, NEW TECHNOLOGY GROUP 5: ICD-10-PCS | Performed by: HOSPITALIST

## 2020-12-25 PROCEDURE — 5A0955A ASSISTANCE WITH RESPIRATORY VENTILATION, GREATER THAN 96 CONSECUTIVE HOURS, HIGH NASAL FLOW/VELOCITY: ICD-10-PCS | Performed by: HOSPITALIST

## 2020-12-25 PROCEDURE — 99233 SBSQ HOSP IP/OBS HIGH 50: CPT | Performed by: HOSPITALIST

## 2020-12-25 PROCEDURE — 99233 SBSQ HOSP IP/OBS HIGH 50: CPT | Performed by: INTERNAL MEDICINE

## 2020-12-25 RX ORDER — MAGNESIUM OXIDE 400 MG (241.3 MG MAGNESIUM) TABLET
1 TABLET NIGHTLY
Status: DISCONTINUED | OUTPATIENT
Start: 2020-12-25 | End: 2020-12-31

## 2020-12-25 RX ORDER — ECHINACEA PURPUREA EXTRACT 125 MG
1 TABLET ORAL
Status: DISCONTINUED | OUTPATIENT
Start: 2020-12-25 | End: 2020-12-31

## 2020-12-25 RX ORDER — FAMOTIDINE 20 MG/1
20 TABLET ORAL DAILY
Status: DISCONTINUED | OUTPATIENT
Start: 2020-12-25 | End: 2020-12-31

## 2020-12-25 NOTE — PROGRESS NOTES
[facility]    Progress Note    [place] Patient Status:  Inpatient    1935 MRN [id number]   Location [facility]5W Attending Bertha Pinzon,*   Hosp Day # 2 PCP Moraima Barry MD, Hemalatha Jaffe MD        Subjective: respiratory failure, see above    Ct images reviewed    45 min spent on pt of which 25 min spent coordinating care with nurse and counseling pt                  Results:     Lab Results   Component Value Date    WBC 6.3 12/25/2020    HGB 13.3 12/25/2020 Borderline lymphadenopathy, perhaps reactive. 5. Dilatation of the right main pulmonary artery may relate to underlying pulmonary hypertension. 6. Cholelithiasis without CT evidence of acute complication in the imaged volume.   7. Uncomplicated colonic d

## 2020-12-25 NOTE — PLAN OF CARE
AO3, Sami speaking only, IV remdesiver, encourgaged pronining however patient not tolerating, CT and lower ext ultrasound was negative for PE and DVT, Awaiting FFP, Pt c/o some back pain with which tylenol was given prn, Spoke with daughter in law overn

## 2020-12-25 NOTE — PROGRESS NOTES
[facility] Pharmacy Note:  Asif Linton is a 82year old male admitted 12/23/2020  4:25 PM who is COVID (+) and has been been started on Remdesivir as of 12/24.     Pertinent Patient Lab Values:  Estimated GFR:   Recent Labs     12/25/20  05

## 2020-12-25 NOTE — PROGRESS NOTES
Pt covid+ 12/21. Symptoms started 12/15  Currently on 30L Vapotherm 100% FiO2 SpO2 90%, afebrile.   Inflammatory markers continues to be elevated but trending down except ferritin trending up.  12/24 CT chest negative for PE  12/24 Doppler BLE negative for

## 2020-12-26 LAB
ALBUMIN SERPL-MCNC: 2.3 G/DL (ref 3.4–5)
ALBUMIN/GLOB SERPL: 0.6 {RATIO} (ref 1–2)
ALP LIVER SERPL-CCNC: 59 U/L
ALT SERPL-CCNC: 56 U/L
ANION GAP SERPL CALC-SCNC: 5 MMOL/L (ref 0–18)
AST SERPL-CCNC: 53 U/L (ref 15–37)
BASOPHILS # BLD AUTO: 0.01 X10(3) UL (ref 0–0.2)
BASOPHILS NFR BLD AUTO: 0.2 %
BILIRUB SERPL-MCNC: 0.4 MG/DL (ref 0.1–2)
BLOOD TYPE BARCODE: 1700
BUN BLD-MCNC: 33 MG/DL (ref 7–18)
BUN/CREAT SERPL: 30.8 (ref 10–20)
CALCIUM BLD-MCNC: 8.1 MG/DL (ref 8.5–10.1)
CHLORIDE SERPL-SCNC: 109 MMOL/L (ref 98–112)
CO2 SERPL-SCNC: 27 MMOL/L (ref 21–32)
CREAT BLD-MCNC: 1.07 MG/DL
CRP SERPL-MCNC: 6.71 MG/DL (ref ?–0.3)
D DIMER PPP FEU-MCNC: 1.38 UG/ML FEU (ref ?–0.83)
DEPRECATED HBV CORE AB SER IA-ACNC: 2843.4 NG/ML
DEPRECATED RDW RBC AUTO: 44.8 FL (ref 35.1–46.3)
EOSINOPHIL # BLD AUTO: 0 X10(3) UL (ref 0–0.7)
EOSINOPHIL NFR BLD AUTO: 0 %
ERYTHROCYTE [DISTWIDTH] IN BLOOD BY AUTOMATED COUNT: 13.2 % (ref 11–15)
GLOBULIN PLAS-MCNC: 4 G/DL (ref 2.8–4.4)
GLUCOSE BLD-MCNC: 148 MG/DL (ref 70–99)
HCT VFR BLD AUTO: 37.6 %
HGB BLD-MCNC: 12.9 G/DL
IMM GRANULOCYTES # BLD AUTO: 0.04 X10(3) UL (ref 0–1)
IMM GRANULOCYTES NFR BLD: 0.7 %
LDH SERPL L TO P-CCNC: 460 U/L
LYMPHOCYTES # BLD AUTO: 0.43 X10(3) UL (ref 1–4)
LYMPHOCYTES NFR BLD AUTO: 7.8 %
M PROTEIN MFR SERPL ELPH: 6.3 G/DL (ref 6.4–8.2)
MCH RBC QN AUTO: 31.5 PG (ref 26–34)
MCHC RBC AUTO-ENTMCNC: 34.3 G/DL (ref 31–37)
MCV RBC AUTO: 91.7 FL
MONOCYTES # BLD AUTO: 0.5 X10(3) UL (ref 0.1–1)
MONOCYTES NFR BLD AUTO: 9.1 %
NEUTROPHILS # BLD AUTO: 4.53 X10 (3) UL (ref 1.5–7.7)
NEUTROPHILS # BLD AUTO: 4.53 X10(3) UL (ref 1.5–7.7)
NEUTROPHILS NFR BLD AUTO: 82.2 %
OSMOLALITY SERPL CALC.SUM OF ELEC: 302 MOSM/KG (ref 275–295)
PHOSPHATE SERPL-MCNC: 3.4 MG/DL (ref 2.5–4.9)
PLATELET # BLD AUTO: 244 10(3)UL (ref 150–450)
POTASSIUM SERPL-SCNC: 3.6 MMOL/L (ref 3.5–5.1)
RBC # BLD AUTO: 4.1 X10(6)UL
SODIUM SERPL-SCNC: 141 MMOL/L (ref 136–145)
WBC # BLD AUTO: 5.5 X10(3) UL (ref 4–11)

## 2020-12-26 PROCEDURE — 99232 SBSQ HOSP IP/OBS MODERATE 35: CPT | Performed by: INTERNAL MEDICINE

## 2020-12-26 PROCEDURE — 99233 SBSQ HOSP IP/OBS HIGH 50: CPT | Performed by: HOSPITALIST

## 2020-12-26 RX ORDER — VANCOMYCIN HYDROCHLORIDE 125 MG/1
125 CAPSULE ORAL DAILY
Status: DISCONTINUED | OUTPATIENT
Start: 2020-12-26 | End: 2020-12-31

## 2020-12-26 NOTE — PLAN OF CARE
Patient resting well overnight. Oxygen continues to fluctuate into the mid 80's to low 90's. Dr. Flores Cue updated. Patient encouraged to turn to side and once relaxed after movement saturations increased. Will continue to monitor.         Problem: Patient Dagmar pt/family on patient safety including physical limitations  - Instruct pt to call for assistance with activity based on assessment  - Modify environment to reduce risk of injury  - Provide assistive devices as appropriate  - Consider OT/PT consult to ankit

## 2020-12-26 NOTE — PROGRESS NOTES
[facility]    Progress Note    [place] Patient Status:  Inpatient    1935 MRN [id number]   Location [facility]5W Attending Ad Ansari,*   Hosp Day # 3 PCP Metro Schaumann MD, Tess Ramos MD        Subjective: Lab Results   Component Value Date    WBC 5.5 12/26/2020    HGB 12.9 (L) 12/26/2020    HCT 37.6 (L) 12/26/2020    .0 12/26/2020    CREATSERUM 1.07 12/26/2020    BUN 33 (H) 12/26/2020     12/26/2020    K 3.6 12/26/2020     12/26/2020

## 2020-12-26 NOTE — PROGRESS NOTES
[facility]    Progress Note    [place] Patient Status:  Inpatient    1935 MRN [id number]   Location [facility] Attending Blaine Newberry MD   Hosp Day # 2 PCP Justin Pisano MD, MD JAYLA        Subjective:     Fang Cole px  SCDs  Lovenox     Pt is complex and high risk  D/w staff   D/w Daughter Baxter Lefort in length                Results:     Lab Results   Component Value Date    WBC 6.3 12/25/2020    HGB 13.3 12/25/2020    HCT 38.9 (L) 12/25/2020    .0 12/25/2020    CR incidental findings as above. Remote. Dictated by (CST): Alfie Wright MD on 12/24/2020 at 3:44 PM     Finalized by (CST): Alfie Wright MD on 12/24/2020 at 3:54 PM                       Xi Mujica.  Jian Guzman MD  12/25/2020

## 2020-12-26 NOTE — PLAN OF CARE
Patient on Vapo therm all day ( 100% 02/ 30L) . IV abx + PO steroids + Convalescent plasma administered today. Remdesivir administered early this am. Patient up to the chair or 2 hours. New IV inserted right forearm.      Problem: Patient [address] patient safety including physical limitations  - Instruct pt to call for assistance with activity based on assessment  - Modify environment to reduce risk of injury  - Provide assistive devices as appropriate  - Consider OT/PT consult to assist with streng

## 2020-12-26 NOTE — RESPIRATORY THERAPY NOTE
Patient received on VAPOTHERM 30 L and fio2 100%,O2 sat 87%,patient eating. 1438: O2 sat  92% on  100%,fio2  Decreased to 95%,O2 sat 89%.

## 2020-12-27 LAB
ALBUMIN SERPL-MCNC: 2.3 G/DL (ref 3.4–5)
ALBUMIN/GLOB SERPL: 0.6 {RATIO} (ref 1–2)
ALP LIVER SERPL-CCNC: 59 U/L
ALT SERPL-CCNC: 71 U/L
ANION GAP SERPL CALC-SCNC: 4 MMOL/L (ref 0–18)
AST SERPL-CCNC: 71 U/L (ref 15–37)
BASOPHILS # BLD AUTO: 0.01 X10(3) UL (ref 0–0.2)
BASOPHILS NFR BLD AUTO: 0.2 %
BILIRUB SERPL-MCNC: 0.6 MG/DL (ref 0.1–2)
BUN BLD-MCNC: 35 MG/DL (ref 7–18)
BUN/CREAT SERPL: 32.4 (ref 10–20)
CALCIUM BLD-MCNC: 8.3 MG/DL (ref 8.5–10.1)
CHLORIDE SERPL-SCNC: 109 MMOL/L (ref 98–112)
CO2 SERPL-SCNC: 27 MMOL/L (ref 21–32)
CREAT BLD-MCNC: 1.08 MG/DL
D DIMER PPP FEU-MCNC: 1.61 UG/ML FEU (ref ?–0.83)
DEPRECATED RDW RBC AUTO: 44.1 FL (ref 35.1–46.3)
EOSINOPHIL # BLD AUTO: 0 X10(3) UL (ref 0–0.7)
EOSINOPHIL NFR BLD AUTO: 0 %
ERYTHROCYTE [DISTWIDTH] IN BLOOD BY AUTOMATED COUNT: 13.2 % (ref 11–15)
GLOBULIN PLAS-MCNC: 4.1 G/DL (ref 2.8–4.4)
GLUCOSE BLD-MCNC: 143 MG/DL (ref 70–99)
HAV IGM SER QL: 2.3 MG/DL (ref 1.6–2.6)
HCT VFR BLD AUTO: 38.6 %
HGB BLD-MCNC: 13.4 G/DL
IMM GRANULOCYTES # BLD AUTO: 0.03 X10(3) UL (ref 0–1)
IMM GRANULOCYTES NFR BLD: 0.5 %
LYMPHOCYTES # BLD AUTO: 0.44 X10(3) UL (ref 1–4)
LYMPHOCYTES NFR BLD AUTO: 7.8 %
M PROTEIN MFR SERPL ELPH: 6.4 G/DL (ref 6.4–8.2)
MCH RBC QN AUTO: 31.8 PG (ref 26–34)
MCHC RBC AUTO-ENTMCNC: 34.7 G/DL (ref 31–37)
MCV RBC AUTO: 91.5 FL
MONOCYTES # BLD AUTO: 0.52 X10(3) UL (ref 0.1–1)
MONOCYTES NFR BLD AUTO: 9.3 %
NEUTROPHILS # BLD AUTO: 4.62 X10 (3) UL (ref 1.5–7.7)
NEUTROPHILS # BLD AUTO: 4.62 X10(3) UL (ref 1.5–7.7)
NEUTROPHILS NFR BLD AUTO: 82.2 %
OSMOLALITY SERPL CALC.SUM OF ELEC: 300 MOSM/KG (ref 275–295)
PLATELET # BLD AUTO: 290 10(3)UL (ref 150–450)
POTASSIUM SERPL-SCNC: 4.1 MMOL/L (ref 3.5–5.1)
RBC # BLD AUTO: 4.22 X10(6)UL
SODIUM SERPL-SCNC: 140 MMOL/L (ref 136–145)
WBC # BLD AUTO: 5.6 X10(3) UL (ref 4–11)

## 2020-12-27 PROCEDURE — 99231 SBSQ HOSP IP/OBS SF/LOW 25: CPT | Performed by: INTERNAL MEDICINE

## 2020-12-27 PROCEDURE — 99233 SBSQ HOSP IP/OBS HIGH 50: CPT | Performed by: HOSPITALIST

## 2020-12-27 NOTE — RESPIRATORY THERAPY NOTE
Pt remains on vapotherm 30L/95%. Pt saturating appropriately 91-94%. FIO2 weaned to 90%. RT continue to monitor.

## 2020-12-27 NOTE — PLAN OF CARE
Patient being weened very slowly off high 02 requirements. Slight improvement in 02 saturations overall today (avg 87%- 93%). Patient was up to the chair for 2 hours again and did tolerate proneing in the bed x 1 for 25 min.  Respiratory therapy adjusting v cognitive and physical deficits and behaviors that affect risk of falls.   - [place] fall precautions as indicated by assessment.  - Educate pt/family on patient safety including physical limitations  - Instruct pt to call for assistance with activity bas

## 2020-12-27 NOTE — PROGRESS NOTES
Pulmonary/Critical Care Follow Up Note    HPI:   Matt Moreno is a 82year old male with Patient presents with:  Cough/URI  Breathing Problem      PCP Donis Bentley MD, Ida Bradshaw MD  Admission Attending Asmita Crump MD    Hospital Day #4    No events Intravenous, Q24H      Allergies:  No Known Allergies        PHYSICAL EXAM:   Blood pressure 141/70, pulse 102, temperature 97.8 °F (36.6 °C), temperature source Oral, resp. rate 20, height 6' (1.829 m), weight 194 lb 3.2 oz (88.1 kg), SpO2 92 %.     Intake

## 2020-12-27 NOTE — PROGRESS NOTES
[facility]    Progress Note    [place] Patient Status:  Inpatient    1935 MRN [id number]   Location [facility]5W Attending Inna Flynn Day # 4 PCP Roula Mandujano MD, Cara Gusman MD        Subjective: Results:     Lab Results   Component Value Date    WBC 5.6 12/27/2020    HGB 13.4 12/27/2020    HCT 38.6 (L) 12/27/2020    .0 12/27/2020    CREATSERUM 1.08 12/27/2020    BUN 35 (H) 12/27/2020     12/27/2020    K 4.1 12/27/2020     12/2

## 2020-12-27 NOTE — OCCUPATIONAL THERAPY NOTE
*O2 Saturation on Vapotherm 75% FiO2/30 lpm: 94% at rest, and 96% with activity     OCCUPATIONAL THERAPY EVALUATION - INPATIENT     Room Number: 511/511-A  Evaluation Date: 12/27/2020  Type of Evaluation: Initial  Presenting Problem: (Covid19+)    Physicia social history and translating Amharic <> English for the patient and therapy. Patient denied pain throughout session but did report dizziness and nausea at end of session. Vitals stable and RN notified.  No SOB noted during session with O2 stable throughou family could stay with him 24/7 at d/c if needed. SUBJECTIVE  Patient indicated feeling nauseous at end of session     OCCUPATIONAL THERAPY EXAMINATION      OBJECTIVE  Precautions: Bed/chair alarm;  needed  Fall Risk: High fall risk    PAIN AS in chair;Needs met;Call light within reach;RN aware of session/findings; All patient questions and concerns addressed; Alarm set    OT Goals  Patients self stated goal is: not stated     Patient will complete toilet transfer with min a   Comment:     Patient

## 2020-12-27 NOTE — PHYSICAL THERAPY NOTE
HR: 95 bpm  BP EOB: 152/84  BP after activity: 151/84  O2 saturation on Vapotherm 75%  FiO2/30 lpm 96% with activity.          PHYSICAL THERAPY TREATMENT NOTE - INPATIENT     Room Number: 573/342-U       Presenting Problem: (+COVID 19)    Problem List  Prin training;Strengthening;Transfer training;Balance training;Energy conservation    SUBJECTIVE  Pt was agreeable to therapy session. OBJECTIVE  Precautions: Bed/chair alarm;  needed    WEIGHT BEARING RESTRICTION  Weight Bearing Restriction: None Goals to be met by: 1/7/21  Patient Goal Patient's self-stated goal is: to walk   Goal #1 Patient is able to demonstrate supine - sit EOB @ level: independent     Goal #1   Current Status Mod A x2   Goal #2 Patient is able to demonstrate transfers Sit

## 2020-12-27 NOTE — CM/SW NOTE
Per chart review and case finding PT/OT recommending ALIX post dc. CM sent ALIX ref via [employer]. Will need f/u on choice and to begin ins auth. Keshav Batista RN, BSN  Nurse   [phone]

## 2020-12-27 NOTE — RESPIRATORY THERAPY NOTE
Patient received on VAPOTHERM 30 L and fio2 80%,O2 sat 91%, fio2 decreased to 75%,O2 sat 90%. 1000am: O2 sat 92%. 1130am: fio2 70%. 1415: FIO2 decreased to 65%,O2 sat 92%. RT will continue to monitor.

## 2020-12-27 NOTE — PLAN OF CARE
Patient feels better today, vss, up to chair for meals, poor appetite, will monitor  Problem: Patient Centered Care  Goal: Patient preferences are identified and integrated in the patient's plan of care  Description: Interventions:  - What would you like u to reduce risk of injury  - Provide assistive devices as appropriate  - Consider OT/PT consult to assist with strengthening/mobility  - Encourage toileting schedule  Outcome: Progressing     Problem: RESPIRATORY - ADULT  Goal: Achieves optimal ventilation

## 2020-12-27 NOTE — PLAN OF CARE
Patient breathing well overnight. Some complaints of back pain, medication given. This Rn worked with respiratory to begin to titrate vapotherm to transition to nasal cannula. Patient tolerating well, sats in 90's.       Problem: Patient Centered Care  Goal patient safety including physical limitations  - Instruct pt to call for assistance with activity based on assessment  - Modify environment to reduce risk of injury  - Provide assistive devices as appropriate  - Consider OT/PT consult to assist with streng

## 2020-12-27 NOTE — SLP NOTE
SPEECH DAILY NOTE - INPATIENT    ASSESSMENT & PLAN   ASSESSMENT  PT COVID-19 POSITIVE. CONTACT AND DROPLET ISOLATION PPE REQUIRED. THIS THERAPIST WORE GOWN, GLOVES, FACE SHIELD, AND DROPLET/N95 RESPIRATOR MASK. HANDS SANITIZED/WASHED UPON ENTRANCE/EXIT. straws  [place] Precautions: Upright position; Slow rate;Small bites and sips; No straw  Medication Administration Recommendations: Crushed in puree    Patient Experiencing Pain: No    Discharge Recommendations  Discharge Recommendations/Plan: Undetermine

## 2020-12-28 LAB
ALBUMIN SERPL-MCNC: 2.5 G/DL (ref 3.4–5)
ALBUMIN/GLOB SERPL: 0.6 {RATIO} (ref 1–2)
ALP LIVER SERPL-CCNC: 65 U/L
ALT SERPL-CCNC: 77 U/L
ANION GAP SERPL CALC-SCNC: 3 MMOL/L (ref 0–18)
AST SERPL-CCNC: 59 U/L (ref 15–37)
BASOPHILS # BLD AUTO: 0.01 X10(3) UL (ref 0–0.2)
BASOPHILS NFR BLD AUTO: 0.2 %
BILIRUB SERPL-MCNC: 0.6 MG/DL (ref 0.1–2)
BUN BLD-MCNC: 30 MG/DL (ref 7–18)
BUN/CREAT SERPL: 28.8 (ref 10–20)
CALCIUM BLD-MCNC: 7.8 MG/DL (ref 8.5–10.1)
CHLORIDE SERPL-SCNC: 110 MMOL/L (ref 98–112)
CO2 SERPL-SCNC: 26 MMOL/L (ref 21–32)
CREAT BLD-MCNC: 1.04 MG/DL
DEPRECATED RDW RBC AUTO: 42.6 FL (ref 35.1–46.3)
EOSINOPHIL # BLD AUTO: 0 X10(3) UL (ref 0–0.7)
EOSINOPHIL NFR BLD AUTO: 0 %
ERYTHROCYTE [DISTWIDTH] IN BLOOD BY AUTOMATED COUNT: 12.8 % (ref 11–15)
GLOBULIN PLAS-MCNC: 4.2 G/DL (ref 2.8–4.4)
GLUCOSE BLD-MCNC: 133 MG/DL (ref 70–99)
HAV IGM SER QL: 2 MG/DL (ref 1.6–2.6)
HCT VFR BLD AUTO: 38.6 %
HGB BLD-MCNC: 13.1 G/DL
IMM GRANULOCYTES # BLD AUTO: 0.03 X10(3) UL (ref 0–1)
IMM GRANULOCYTES NFR BLD: 0.5 %
LYMPHOCYTES # BLD AUTO: 0.37 X10(3) UL (ref 1–4)
LYMPHOCYTES NFR BLD AUTO: 6.5 %
M PROTEIN MFR SERPL ELPH: 6.7 G/DL (ref 6.4–8.2)
MCH RBC QN AUTO: 31 PG (ref 26–34)
MCHC RBC AUTO-ENTMCNC: 33.9 G/DL (ref 31–37)
MCV RBC AUTO: 91.3 FL
MONOCYTES # BLD AUTO: 0.36 X10(3) UL (ref 0.1–1)
MONOCYTES NFR BLD AUTO: 6.3 %
NEUTROPHILS # BLD AUTO: 4.92 X10 (3) UL (ref 1.5–7.7)
NEUTROPHILS # BLD AUTO: 4.92 X10(3) UL (ref 1.5–7.7)
NEUTROPHILS NFR BLD AUTO: 86.5 %
OSMOLALITY SERPL CALC.SUM OF ELEC: 296 MOSM/KG (ref 275–295)
PHOSPHATE SERPL-MCNC: 3.4 MG/DL (ref 2.5–4.9)
PLATELET # BLD AUTO: 308 10(3)UL (ref 150–450)
POTASSIUM SERPL-SCNC: 3.9 MMOL/L (ref 3.5–5.1)
RBC # BLD AUTO: 4.23 X10(6)UL
SODIUM SERPL-SCNC: 139 MMOL/L (ref 136–145)
WBC # BLD AUTO: 5.7 X10(3) UL (ref 4–11)

## 2020-12-28 PROCEDURE — 99233 SBSQ HOSP IP/OBS HIGH 50: CPT | Performed by: HOSPITALIST

## 2020-12-28 PROCEDURE — 99233 SBSQ HOSP IP/OBS HIGH 50: CPT | Performed by: INTERNAL MEDICINE

## 2020-12-28 NOTE — RESPIRATORY THERAPY NOTE
PT RECEIVED ON THE VAPOTHERM  30 L FIO2 55%,PT WAS DESATURATING AND FIO2 INCREASED TO 70%, O2 SAT 90% AND RT WILL CONTINUE TO MONITOR

## 2020-12-28 NOTE — PLAN OF CARE
Problem: Patient Centered Care  Goal: Patient preferences are identified and integrated in the patient's plan of care  Description: Interventions:  - What would you like us to know as we care for you? I live at home alone. I only speak Czech.    - Blanca OT/PT consult to assist with strengthening/mobility  - Encourage toileting schedule  Outcome: Progressing     Problem: RESPIRATORY - ADULT  Goal: Achieves optimal ventilation and oxygenation  Description: INTERVENTIONS:  - Assess for changes in respiratory

## 2020-12-28 NOTE — PROGRESS NOTES
Weaned vapotherm flow down from 30L to 25 L and FIo2 down to 60%.      12/28/20 1136   Oxygen Utilization   O2 Device High flow/High humidity   O2 Flow Rate (L/min) 25 L/min   FiO2 (%) 60 %

## 2020-12-28 NOTE — PROGRESS NOTES
[facility]    Progress Note    [place] Patient Status:  Inpatient    1935 MRN [id number]   Location [facility]5W Attending Kristin Flynn Day # 5 PCP Jaz Lundberg MD        Subjective: care                 Results:     Lab Results   Component Value Date    WBC 5.7 12/28/2020    HGB 13.1 12/28/2020    HCT 38.6 (L) 12/28/2020    .0 12/28/2020    CREATSERUM 1.04 12/28/2020    BUN 30 (H) 12/28/2020     12/28/2020    K 3.9 12/28/

## 2020-12-28 NOTE — PLAN OF CARE
Patient alert and oriented, No complaints throughout the night. Attempted to wean down O2, but was not successful, pt quickly desaturates and takes time to recover. Pt currently on vapotherm 30L Fi02-70%.  Educated patient to call for assistance call light indicated by assessment.  - Educate pt/family on patient safety including physical limitations  - Instruct pt to call for assistance with activity based on assessment  - Modify environment to reduce risk of injury  - Provide assistive devices as appropriat

## 2020-12-28 NOTE — PROGRESS NOTES
Pt covid+ 12/21. Symptoms started 12/15  Currently on 30L Vapotherm 65% FiO2 SpO2 93%, afebrile. 12/24 CT chest negative for PE  12/24 Doppler BLE negative for DVT.        S/P CCP 12/25   S/P Actemra 12/24  RDV day 5/5  Continue Lovenox daily, decadron PO,

## 2020-12-28 NOTE — RESPIRATORY THERAPY NOTE
Pt remains on vapotherm 30L/65%. Pt tolerating well. FIO2 weaned to 55%. Pt saturating appropriately with sats 89-93%. RT continue to monitor.

## 2020-12-28 NOTE — PAYOR COMM NOTE
--------------  CONTINUED STAY REVIEW    Payor: Jeff Persaud #:  [id number]  Authorization Number: [id number]    Admit date: 12/23/20  Admit time: 2253 12/24  PULMONOLOGY PROGRESS NOTE:  ASSESSMENT/PLAN:      COVID-19  ivania Vasquez , RN, APN    12/25 HOSPITALIST PROGRESS NOTE:  Assessment and Plan:      1.      COVID-19 pneumonia and hypoxemia.  o2 requirements inc, so far ddimer<5x nl; on covid meds.   Patient's oxygen requirement is quite high with only marginal s    1.      COVID-19 pneumonia and hypoxemia.  o2 requirements inc, so far ddimer<5x nl; on covid meds           Steroids; dw daughter to re-educate him on proning  2.       History of hypertension.   3.       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

## 2020-12-28 NOTE — PAYOR COMM NOTE
--------------  ADMISSION REVIEW     Payor: Jeff Persaud #:  [id number]  Authorization Number: [id number]    Admit date: 12/23/20  Admit time: 2253       REVIEW DOCUMENTATION:     ED Provider Notes      ED Provider Notes signed Normal S1 and S2, regular, with good peripheral perfusion. Respiratory:   He has increased work of breathing with respiratory approximately 30 much more comfortable with nasal cannula oxygen  Abdomen:  Soft, non-tender, non-distended.   No masses, no hepat  (*)     All other components within normal limits   C-REACTIVE PROTEIN - Abnormal; Notable for the following components:    C-Reactive Protein 14.20 (*)     All other components within normal limits   FERRITIN - Abnormal; Notable for the following PM Status: Signed    : Delfino Beverly MD (Physician)         [facility]    PATIENT'S NAME: Adwoa AGUILERA   ATTENDING PHYSICIAN: Latesha Gunn.  Rusty Patel MD   PATIENT ACCOUNT#:   [id number]    LOCATION:  [facility] [address] [address]  MEDICAL RECORD #: breath even at rest.  Other 12-point review of systems negative. PHYSICAL EXAMINATION:    GENERAL:  Alert and oriented to time, place, and person. Moderate distress.   VITAL SIGNS:  Temperature 98.9, pulse 86, respiratory rate 30, blood pressure 146/6 12/28/2020 3170 Given 10 mg Oral Tommy Caballero RN    12/27/2020 1006 Given 10 mg Oral Mono Sidhu RN      azithromycin [facility]) tab 500 mg     Date Action Dose Route User    12/28/2020 7414 Given 500 mg Oral Tommy Caballero 125 mg Oral Geneva Yeager RN    12/27/2020 1006 Given 125 mg Oral Geneva Yeager RN          REVIEWER COMMENTS:     OTHER:

## 2020-12-28 NOTE — CM/SW NOTE
12/28/20:  1434:  SW self-referred to patient chart for discharge planning needs. Per chart review, referrals sent via aidin to SNFs. SW noted by aidin, pt has only been accepted at [facility].      SW placed call to emergency contact, Shadi Mejia to discuss yumiko be started. Family would like staff to talk to pt about going to snf for short term. Family states that if pt can safely return home with intermittent supervision then they feel comfortable with plan for home.     SW spoke to Ssuan to initiate insurance

## 2020-12-29 LAB
ANION GAP SERPL CALC-SCNC: 2 MMOL/L (ref 0–18)
BASOPHILS # BLD AUTO: 0.02 X10(3) UL (ref 0–0.2)
BASOPHILS NFR BLD AUTO: 0.3 %
BUN BLD-MCNC: 30 MG/DL (ref 7–18)
BUN/CREAT SERPL: 30 (ref 10–20)
CALCIUM BLD-MCNC: 8.1 MG/DL (ref 8.5–10.1)
CHLORIDE SERPL-SCNC: 110 MMOL/L (ref 98–112)
CO2 SERPL-SCNC: 28 MMOL/L (ref 21–32)
CREAT BLD-MCNC: 1 MG/DL
D DIMER PPP FEU-MCNC: 2.15 UG/ML FEU (ref ?–0.83)
DEPRECATED RDW RBC AUTO: 42.8 FL (ref 35.1–46.3)
EOSINOPHIL # BLD AUTO: 0.06 X10(3) UL (ref 0–0.7)
EOSINOPHIL NFR BLD AUTO: 0.8 %
ERYTHROCYTE [DISTWIDTH] IN BLOOD BY AUTOMATED COUNT: 12.7 % (ref 11–15)
GLUCOSE BLD-MCNC: 118 MG/DL (ref 70–99)
HAV IGM SER QL: 2.2 MG/DL (ref 1.6–2.6)
HCT VFR BLD AUTO: 39.7 %
HGB BLD-MCNC: 13.7 G/DL
IMM GRANULOCYTES # BLD AUTO: 0.05 X10(3) UL (ref 0–1)
IMM GRANULOCYTES NFR BLD: 0.7 %
LYMPHOCYTES # BLD AUTO: 0.54 X10(3) UL (ref 1–4)
LYMPHOCYTES NFR BLD AUTO: 7.3 %
MCH RBC QN AUTO: 31.5 PG (ref 26–34)
MCHC RBC AUTO-ENTMCNC: 34.5 G/DL (ref 31–37)
MCV RBC AUTO: 91.3 FL
MONOCYTES # BLD AUTO: 0.63 X10(3) UL (ref 0.1–1)
MONOCYTES NFR BLD AUTO: 8.6 %
NEUTROPHILS # BLD AUTO: 6.06 X10 (3) UL (ref 1.5–7.7)
NEUTROPHILS # BLD AUTO: 6.06 X10(3) UL (ref 1.5–7.7)
NEUTROPHILS NFR BLD AUTO: 82.3 %
OSMOLALITY SERPL CALC.SUM OF ELEC: 297 MOSM/KG (ref 275–295)
PLATELET # BLD AUTO: 311 10(3)UL (ref 150–450)
POTASSIUM SERPL-SCNC: 4.2 MMOL/L (ref 3.5–5.1)
RBC # BLD AUTO: 4.35 X10(6)UL
SODIUM SERPL-SCNC: 140 MMOL/L (ref 136–145)
WBC # BLD AUTO: 7.4 X10(3) UL (ref 4–11)

## 2020-12-29 PROCEDURE — 99232 SBSQ HOSP IP/OBS MODERATE 35: CPT | Performed by: INTERNAL MEDICINE

## 2020-12-29 PROCEDURE — 99233 SBSQ HOSP IP/OBS HIGH 50: CPT | Performed by: HOSPITALIST

## 2020-12-29 NOTE — PROGRESS NOTES
[facility]     Progress Note        [place] Patient Status:  Inpatient    1935 MRN [id number]   Location [address] [facility] Attending Rina, [facility] PCP Rito Graff MD, MD Alonso Stinson injection 4 mg, 4 mg, Intravenous, Q6H PRN    •  Metoclopramide HCl (REGLAN) injection 10 mg, 10 mg, Intravenous, Q8H PRN        Continuous Infusions:     Physical Exam  Constitutional: no acute distress  Eyes: PERRL  ENT: nares pateint  Cardio: RRR, S1 S2

## 2020-12-29 NOTE — PROGRESS NOTES
INFECTIOUS DISEASE PROGRESS NOTE    [place] Patient Status:  Inpatient    1935 MRN [id number]   Location [facility]5W Attending Christopher Flynn   Hosp Day # 5 PCP Reyes Dire MD, Paul Brice MD     Subjective:  +cough and S 28-year-old male with a history of prostate cancer status post radiation and brachytherapy, HTN, HLD, aortic stenosis now presents to hospital with cough, shortness of breath, fevers, myalgias, decreased appetite, congestion with symptoms starting about 8

## 2020-12-29 NOTE — PAYOR COMM NOTE
ASKING FOR RECONSIDERATION OF INPT. PT REMAINS HOSPITALIZED. PT IS ON IV REMDESIVIR IV STARTED ON 12/23  HE IS REQUIRING O2 DESAT'S DOWN AS LOW AS 89% 4L/NC  INCREASED O2 TO 10L, HFNC .      CONTINUED STAY REVIEW    Payor: [facility] [place] Date Action Dose Route User    12/29/2020 7898 Given 125 mg Oral Cathleen Patel RN      zolpidem [facility]) tab 5 mg     Date Action Dose Route User    12/28/2020 2209 Given 5 mg Oral Joey Graham RN        12/25 PULMONOLOGY PROGRESS NOTE:  Valeria Husain m), weight 196 lb 11.2 oz (89.2 kg), SpO2 (!) 88 %.     Nursing note and vitals reviewed. Constitutional: He is oriented to person, place, and time. He appears well-developed. No distress. HENT:   Head: Normocephalic and atraumatic.    Cardiovascular: N flow/High humidity 30 L/min BA   12/25/20 0932 — — — — 92 % — High flow/High humidity 30 L/min BA   12/25/20 0846 97.9 °F (36.6 °C) 97 22 152/80 88 %Abnormal  — High flow/High humidity 30 L/min    12/25/20 0540 — — — — 90 % — High flow/High humidity 30 L

## 2020-12-29 NOTE — PROGRESS NOTES
Pulmonary/Critical Care Follow Up Note    HPI:   Pedro Westfall is a 82year old male with Patient presents with:  Cough/URI  Breathing Problem      PCP Sal Rosen MD, Mike Wilson MD  Admission Attending Tatiana Coon MD    Hospital Day #5    C/o mild co source Oral, resp. rate 20, height 6' (1.829 m), weight 192 lb 11.2 oz (87.4 kg), SpO2 95 %.     Intake/Output Summary (Last 24 hours) at 12/28/2020 1859  Last data filed at 12/28/2020 1820  Gross per 24 hour   Intake 605 ml   Output 1425 ml   Net -820 ml

## 2020-12-29 NOTE — PLAN OF CARE
Pt feeling much better today. Patient able to be weaned off vapotherm and onto 8L hiflow NC, SPo2 94%. IV abx. PO decadron. Afebrile. Meds crushed in applesauce. NTL/puree diet maintained.      Problem: Patient Centered Care  Goal: Patient preferences are i physical limitations  - Instruct pt to call for assistance with activity based on assessment  - Modify environment to reduce risk of injury  - Provide assistive devices as appropriate  - Consider OT/PT consult to assist with strengthening/mobility  - [place]

## 2020-12-29 NOTE — OCCUPATIONAL THERAPY NOTE
OCCUPATIONAL THERAPY TREATMENT NOTE - INPATIENT        Room Number: 253/160-F           Presenting Problem: (Covid19+)    Problem List  Principal Problem:    Pneumonia due to COVID-19 virus  Active Problems:    Hypoxia      OCCUPATIONAL THERAPY ASSESSMENT clicks' Inpatient Daily Activity Short Form. Research supports that patients with this level of impairment may benefit from ALIX. Pt does not demonstrate the physical skills required to safely return home.  Recommend ALIX to maximize participation, indepe of Session: Up in chair;Needs met;Call light within reach;RN aware of session/findings; Alarm set    OT Goals:  Patients self stated goal is: not stated      Patient will complete toilet transfer with min a   Comment: ongoing    Patient will complete toilet

## 2020-12-29 NOTE — RESPIRATORY THERAPY NOTE
Patient received on vapotherm 20 L and fio2 55%,O2 sat 97%,gio2 decreased to 45%. 1025 am: Patient placed on HF NC 10 L,O2 sat 96%.

## 2020-12-29 NOTE — PHYSICAL THERAPY NOTE
PHYSICAL THERAPY TREATMENT NOTE - INPATIENT     Room Number: 881/877-I       Presenting Problem: (+COVID 19)    Problem List  Principal Problem:    Pneumonia due to COVID-19 virus  Active Problems:    Hypoxia      PHYSICAL THERAPY ASSESSMENT   During thi mobility; Body mechanics; Endurance; Patient education;Gait training;Strengthening;Transfer training;Balance training;Energy conservation    SUBJECTIVE  \"Now, I feel dizzy\"    OBJECTIVE  Precautions: Bed/chair alarm;  needed    WEIGHT BEARING REST Assistance: Not tested  Comment : -      Patient End of Session: Up in chair;Needs met;Call light within reach;RN aware of session/findings; Alarm set    CURRENT GOALS   Goals to be met by: 1/7/21  Patient Goal Patient's self-stated goal is: to walk   Goal

## 2020-12-29 NOTE — PROGRESS NOTES
Pt covid+ 12/21. Symptoms started 12/15  Currently on 10L NC SpO2 98%, afebrile. 12/24 CT chest negative for PE  12/24 Doppler BLE negative for DVT.        S/P CCP 12/25   S/P Actemra 12/24  S/P 5 day course of RDV (12/24 - 12/28)  S/P 5 day course of azit

## 2020-12-30 LAB
BASOPHILS # BLD AUTO: 0.01 X10(3) UL (ref 0–0.2)
BASOPHILS NFR BLD AUTO: 0.1 %
D DIMER PPP FEU-MCNC: 2.26 UG/ML FEU (ref ?–0.83)
DEPRECATED RDW RBC AUTO: 42.5 FL (ref 35.1–46.3)
EOSINOPHIL # BLD AUTO: 0.05 X10(3) UL (ref 0–0.7)
EOSINOPHIL NFR BLD AUTO: 0.7 %
ERYTHROCYTE [DISTWIDTH] IN BLOOD BY AUTOMATED COUNT: 12.7 % (ref 11–15)
HAV IGM SER QL: 2.2 MG/DL (ref 1.6–2.6)
HCT VFR BLD AUTO: 43.5 %
HGB BLD-MCNC: 14.8 G/DL
IMM GRANULOCYTES # BLD AUTO: 0.05 X10(3) UL (ref 0–1)
IMM GRANULOCYTES NFR BLD: 0.7 %
LYMPHOCYTES # BLD AUTO: 0.61 X10(3) UL (ref 1–4)
LYMPHOCYTES NFR BLD AUTO: 8.7 %
MCH RBC QN AUTO: 31 PG (ref 26–34)
MCHC RBC AUTO-ENTMCNC: 34 G/DL (ref 31–37)
MCV RBC AUTO: 91.2 FL
MONOCYTES # BLD AUTO: 0.54 X10(3) UL (ref 0.1–1)
MONOCYTES NFR BLD AUTO: 7.7 %
NEUTROPHILS # BLD AUTO: 5.76 X10 (3) UL (ref 1.5–7.7)
NEUTROPHILS # BLD AUTO: 5.76 X10(3) UL (ref 1.5–7.7)
NEUTROPHILS NFR BLD AUTO: 82.1 %
PLATELET # BLD AUTO: 356 10(3)UL (ref 150–450)
POTASSIUM SERPL-SCNC: 3.8 MMOL/L (ref 3.5–5.1)
RBC # BLD AUTO: 4.77 X10(6)UL
WBC # BLD AUTO: 7 X10(3) UL (ref 4–11)

## 2020-12-30 PROCEDURE — 99233 SBSQ HOSP IP/OBS HIGH 50: CPT | Performed by: HOSPITALIST

## 2020-12-30 PROCEDURE — 99233 SBSQ HOSP IP/OBS HIGH 50: CPT | Performed by: INTERNAL MEDICINE

## 2020-12-30 RX ORDER — MULTIPLE VITAMINS W/ MINERALS TAB 9MG-400MCG
1 TAB ORAL DAILY
Status: DISCONTINUED | OUTPATIENT
Start: 2020-12-30 | End: 2020-12-31

## 2020-12-30 RX ORDER — POTASSIUM CHLORIDE 20 MEQ/1
40 TABLET, EXTENDED RELEASE ORAL ONCE
Status: COMPLETED | OUTPATIENT
Start: 2020-12-30 | End: 2020-12-30

## 2020-12-30 NOTE — PROGRESS NOTES
[facility]    Progress Note    [place] Patient Status:  Inpatient    1935 MRN [id number]   Location [facility]5W Attending Damon Russo,*   Hosp Day # 6 PCP Gem Thakkar MD        Subjective: care  Poss dc fri/sat prefer hhc if possible vs tomas; no one can be with him 24/7 may need tomas                 Results:     Lab Results   Component Value Date    WBC 7.4 12/29/2020    HGB 13.7 12/29/2020    HCT 39.7 12/29/2020    .0 12/29/2020    CRE

## 2020-12-30 NOTE — CM/SW NOTE
12/31 154pm: SW received copy of the pt's medicare card which per family will be the pt's coverage starting 1/1/21. Medicare card has been send to [facility] via [employer].   Arnaldo Fraser is aware.   -----------------------------  12/31 142pm: The pt's insurance has a

## 2020-12-30 NOTE — PROGRESS NOTES
[address]  : 1935    Status: Inpatient  Day #: 7    Attending: Gonzalez Jason MD  PCP: Cayden Gusman MD, Nemesio Mcbride MD      Assessment and Plan     COVID-19 pneumonia  Acute hypoxemic respiratory failure 140 139 140  --    K 3.5  --  3.7  --  3.9 3.6 4.1 3.9 4.2 3.8     --  107  --  110 109 109 110 110  --    CO2 28.0  --  27.0  --  26.0 27.0 27.0 26.0 28.0  --    *  --  132*  --  133* 148* 143* 133* 118*  --    MG  --   --   --    < > 2.2  --

## 2020-12-30 NOTE — PROGRESS NOTES
Pulmonary/Critical Care Follow Up Note    HPI:   Jimenez Poe is a 82year old male with Patient presents with:  Cough/URI  Breathing Problem      PCP John Oconnell MD, Rey Claudio MD  Admission Attending Sita Tatum MD    Hospital Day #7    C/o mild co height 6' (1.829 m), weight 187 lb 12.8 oz (85.2 kg), SpO2 97 %.     Intake/Output Summary (Last 24 hours) at 12/30/2020 1548  Last data filed at 12/30/2020 1542  Gross per 24 hour   Intake 896 ml   Output 650 ml   Net 246 ml     Looks less ill  No distress

## 2020-12-30 NOTE — PROGRESS NOTES
Pt covid+ 12/21. Symptoms started 12/15  Currently on 5L NC SpO2 95%, afebrile. 12/24 CT chest negative for PE  12/24 Doppler BLE negative for DVT.        S/P CCP 12/25   S/P Actemra 12/24  S/P 5 day course of RDV (12/24 - 12/28)  S/P 5 day course of azith

## 2020-12-30 NOTE — PLAN OF CARE
Patient very talkative tonight. Patient resting well with HF NC 6L. Patient with HA and mild back pain, tylenol given. Plan for ALIX vs HH. Will continue to monitor.        Problem: Patient Centered Care  Goal: Patient preferences are identified and integrat Instruct pt to call for assistance with activity based on assessment  - Modify environment to reduce risk of injury  - Provide assistive devices as appropriate  - Consider OT/PT consult to assist with strengthening/mobility  - Encourage toileting schedule

## 2020-12-30 NOTE — DIETARY NOTE
ADULT NUTRITION INITIAL ASSESSMENT    RECOMMENDATIONS TO MD:  RAULITO    Pt is at moderate nutrition risk.   *Unable to conduct face to face interview or Nutrition Focus Physical Exam ( NFPE) with Patient due to positive for Covid19 with limited contact restric Nutrition Supplements (ONS) -RD added Magic Cup (290 calories/ 9 g protein each) Orange Daily and Mighty Shake (300 calories/ 9 g protein each) Vanilla Daily and Chocolate Daily. Rationale & benefits discussed.    - Vitamin and mineral supplements:   none 12/28/20 1300  50 %    12/28/20 1820  25 %    12/29/20 0906  30 %    12/29/20 1245  35 %    12/29/20 1857  30 %    12/30/20 1011  100 %            Intake Meeting Needs: No, but supplements to maximize   Food Allergies: No   Cultural/Ethnic/Christianity Prefer

## 2020-12-30 NOTE — SLP NOTE
SPEECH DAILY NOTE - INPATIENT    ASSESSMENT & PLAN   ASSESSMENT      PT IS COVID 19 +. PPE REQUIRED. THIS SLP WORE GLOVES, GOWN, FACE SHIELD AND DROPLET MASK OVER N95 MASK. HANDS SANITIZED/WASHED UPON ENTRANCE/EXIT.     Pt alert, afebrile and on 5L/min 02 v surveillance is recommended. 4. Borderline lymphadenopathy, perhaps reactive. 5. Dilatation of the right main pulmonary artery may relate to underlying pulmonary hypertension.      6. Cholelithiasis without CT evidence of acute complication in the image (clinical evaluation) including Slow rate, Small bites, Alternate liquids/solids, No straws, Upright 90 degrees, Upright 90 degrees 30 mins after meal, Liquids via cup amount only, Supervision with meals with intermittent feed  assistance 100 % of the time

## 2020-12-30 NOTE — PLAN OF CARE
Afebrile. Weaned down to 3L hiflow NC, Spo2 98%. Tylenol PRN. SLP reevaled and NTL/ ground maintained. IV abx. Plan for Rehab at d/c when medically cleared.      Problem: Patient Centered Care  Goal: Patient preferences are identified and integrated in the call for assistance with activity based on assessment  - Modify environment to reduce risk of injury  - Provide assistive devices as appropriate  - Consider OT/PT consult to assist with strengthening/mobility  - Encourage toileting schedule  Outcome: Progr

## 2020-12-31 VITALS
TEMPERATURE: 98 F | RESPIRATION RATE: 18 BRPM | BODY MASS INDEX: 25.44 KG/M2 | DIASTOLIC BLOOD PRESSURE: 88 MMHG | WEIGHT: 187.81 LBS | HEIGHT: 72 IN | SYSTOLIC BLOOD PRESSURE: 151 MMHG | OXYGEN SATURATION: 93 % | HEART RATE: 112 BPM

## 2020-12-31 LAB
ANION GAP SERPL CALC-SCNC: 4 MMOL/L (ref 0–18)
BUN BLD-MCNC: 29 MG/DL (ref 7–18)
BUN/CREAT SERPL: 30.2 (ref 10–20)
CALCIUM BLD-MCNC: 8.2 MG/DL (ref 8.5–10.1)
CHLORIDE SERPL-SCNC: 111 MMOL/L (ref 98–112)
CO2 SERPL-SCNC: 26 MMOL/L (ref 21–32)
CREAT BLD-MCNC: 0.96 MG/DL
D DIMER PPP FEU-MCNC: 1.6 UG/ML FEU (ref ?–0.83)
DEPRECATED RDW RBC AUTO: 41.9 FL (ref 35.1–46.3)
ERYTHROCYTE [DISTWIDTH] IN BLOOD BY AUTOMATED COUNT: 12.5 % (ref 11–15)
GLUCOSE BLD-MCNC: 113 MG/DL (ref 70–99)
HCT VFR BLD AUTO: 38.6 %
HGB BLD-MCNC: 13.4 G/DL
MCH RBC QN AUTO: 31.1 PG (ref 26–34)
MCHC RBC AUTO-ENTMCNC: 34.7 G/DL (ref 31–37)
MCV RBC AUTO: 89.6 FL
OSMOLALITY SERPL CALC.SUM OF ELEC: 299 MOSM/KG (ref 275–295)
PLATELET # BLD AUTO: 315 10(3)UL (ref 150–450)
POTASSIUM SERPL-SCNC: 4.3 MMOL/L (ref 3.5–5.1)
RBC # BLD AUTO: 4.31 X10(6)UL
SODIUM SERPL-SCNC: 141 MMOL/L (ref 136–145)
WBC # BLD AUTO: 6.7 X10(3) UL (ref 4–11)

## 2020-12-31 PROCEDURE — 99232 SBSQ HOSP IP/OBS MODERATE 35: CPT | Performed by: INTERNAL MEDICINE

## 2020-12-31 PROCEDURE — 99239 HOSP IP/OBS DSCHRG MGMT >30: CPT | Performed by: HOSPITALIST

## 2020-12-31 RX ORDER — FAMOTIDINE 20 MG/1
20 TABLET ORAL DAILY
Refills: 0 | Status: SHIPPED | COMMUNITY
Start: 2021-01-01

## 2020-12-31 RX ORDER — DEXAMETHASONE 6 MG/1
6 TABLET ORAL EVERY EVENING
Qty: 1 TABLET | Refills: 0 | Status: SHIPPED | OUTPATIENT
Start: 2021-01-01 | End: 2021-01-02

## 2020-12-31 RX ORDER — ACETAMINOPHEN 325 MG/1
650 TABLET ORAL EVERY 6 HOURS PRN
Status: DISCONTINUED | OUTPATIENT
Start: 2020-12-31 | End: 2020-12-31

## 2020-12-31 RX ORDER — MAGNESIUM OXIDE 400 MG (241.3 MG MAGNESIUM) TABLET
TABLET
Qty: 30 TABLET | Refills: 0 | Status: SHIPPED | COMMUNITY
Start: 2020-12-31

## 2020-12-31 RX ORDER — ACETAMINOPHEN 325 MG/1
650 TABLET ORAL EVERY 6 HOURS PRN
Refills: 0 | Status: SHIPPED | COMMUNITY
Start: 2020-12-31

## 2020-12-31 RX ORDER — ALPRAZOLAM 0.5 MG/1
0.5 TABLET ORAL 3 TIMES DAILY PRN
Qty: 20 TABLET | Refills: 0 | Status: SHIPPED | OUTPATIENT
Start: 2020-12-31

## 2020-12-31 RX ORDER — MULTIPLE VITAMINS W/ MINERALS TAB 9MG-400MCG
1 TAB ORAL DAILY
Qty: 30 TABLET | Refills: 0 | Status: SHIPPED | COMMUNITY
Start: 2021-01-01

## 2020-12-31 RX ORDER — ENOXAPARIN SODIUM 100 MG/ML
40 INJECTION SUBCUTANEOUS DAILY
Refills: 0 | Status: SHIPPED | COMMUNITY
Start: 2021-01-01

## 2020-12-31 RX ORDER — VANCOMYCIN HYDROCHLORIDE 125 MG/1
125 CAPSULE ORAL DAILY
Qty: 5 CAPSULE | Refills: 0 | Status: SHIPPED | OUTPATIENT
Start: 2021-01-01 | End: 2021-01-06

## 2020-12-31 NOTE — OCCUPATIONAL THERAPY NOTE
OCCUPATIONAL THERAPY TREATMENT NOTE - INPATIENT        Room Number: 155/031-F           Presenting Problem: (Covid19+)    Problem List  Principal Problem:    Pneumonia due to COVID-19 virus  Active Problems:    Hypoxia      OCCUPATIONAL THERAPY ASSESSMENT the physical skills required to safely return home.  Recommend ALIX to maximize participation, independence, and safety.     DISCHARGE RECOMMENDATIONS  OT Discharge Recommendations: Sub-acute rehabilitation        PLAN  OT Treatment Plan: Balance activities; stated      Patient will complete toilet transfer with min a   Comment: ongoing    Patient will complete toileting with min a   Comment:  NT    Patient will tolerate standing for 3 minutes in prep for adls with min a    Comment: ongoing                Goal

## 2020-12-31 NOTE — PHYSICAL THERAPY NOTE
PHYSICAL THERAPY TREATMENT NOTE - INPATIENT     Room Number: 442/146-F       Presenting Problem: (+COVID 19)    Problem List  Principal Problem:    Pneumonia due to COVID-19 virus  Active Problems:    Hypoxia      PHYSICAL THERAPY ASSESSMENT   During thi to walk more\"    OBJECTIVE  Precautions: Bed/chair alarm;  needed    WEIGHT BEARING RESTRICTION  Weight Bearing Restriction: None                PAIN ASSESSMENT   Rating: Unable to rate  Location: back  Management Techniques: Body mechanics; Repo by: 1/7/21  Patient Goal Patient's self-stated goal is: to walk   Goal #1 Patient is able to demonstrate supine - sit EOB @ level: independent      Goal #1   Current Status Sit>supine: CG   Goal #2 Patient is able to demonstrate transfers Sit to/from Stand

## 2020-12-31 NOTE — PLAN OF CARE
Pt stable overnight. Sating in the mid 90s on 3 lpm high flow nasal cannula. To go to rehab when medically cleared for d/c.     Problem: Patient Centered Care  Goal: Patient preferences are identified and integrated in the patient's plan of care  Descriptio based on assessment  - Modify environment to reduce risk of injury  - Provide assistive devices as appropriate  - Consider OT/PT consult to assist with strengthening/mobility  - Encourage toileting schedule  Outcome: Progressing     Problem: RESPIRATORY -

## 2020-12-31 NOTE — DISCHARGE SUMMARY
[facility]  Discharge Summary     Leona  : 1935    Status: Inpatient  Day #: 8    Attending: Herminio Muhammad MD  PCP: Jace Reddy MD, Dave Perrin MD     Date of Admission:  2020  Date of Discharge:  2020     [facility] calm and appropriate         Discharge Medications      START taking these medications      Instructions Prescription details   dexamethasone 6 MG Tabs  Commonly known as: DECADRON  Start taking on: January 1, 2021      Take 1 tablet (6 mg total) by mouth MIRALAX      Take 17 g by mouth daily. Quantity: 30 each  Refills: 0     tamsulosin HCl 0.4 MG Caps  Commonly known as: FLOMAX      Take  by mouth daily.    Refills: 0        STOP taking these medications    ferrous sulfate 325 (65 FE) MG Tbec        PEG

## 2020-12-31 NOTE — PROGRESS NOTES
[facility]     Progress Note        [place] Patient Status:  Inpatient    1935 MRN [id number]   Location [address] [facility] Attending Rina, [address] PCP Luciano Ludwig MD, MD [place] Ambrosio Metoclopramide HCl (REGLAN) injection 10 mg, 10 mg, Intravenous, Q8H PRN        Continuous Infusions:     Physical Exam  Constitutional: no acute distress  Eyes: PERRL  ENT: nares pateint  Cardio: RRR, S1 S2  Respiratory: Bilateral crackles  GI: abdomen so

## 2020-12-31 NOTE — PLAN OF CARE
VSS. Calls appropriately. Pain managed with PRN tylenol. Poor appetite. Supplemental O2 weaned as tolerated. Discharged in stable condition to [facility] via [facility] transport. Plan discussed with patient using language line.  Discharge paperwork and prescr needs  - Identify cognitive and physical deficits and behaviors that affect risk of falls.   - [place] fall precautions as indicated by assessment.  - Educate pt/family on patient safety including physical limitations  - Instruct pt to call for assistance

## 2020-12-31 NOTE — PAYOR COMM NOTE
--------------  CONTINUED STAY REVIEW    Payor: Jeff [employer] [place] #:  [id number]  Authorization Number: [id number]    Admit date: 12/23/20  Admit time: 2253    Admitting Physician: Raymond Em MD  Attending Physician:  Chel Jefferson User    12/30/2020 0908 Given 125 mg Oral Roosevelt London, RN      zolpidem [facility]) tab 5 mg     Date Action Dose Route User    12/30/2020 2127 Given 5 mg Oral Adriano, Vinay Henry RN        12/30 HOSPITALIST NOTE   Assessment and Plan      COVID-19 pneumonia 2.5*  --  2.3* 2.3* 2.3* 2.5*  --   --      --  139  --  141 141 140 139 140  --    K 3.5  --  3.7  --  3.9 3.6 4.1 3.9 4.2 3.8     --  107  --  110 109 109 110 110  --    CO2 28.0  --  27.0  --  26.0 27.0 27.0 26.0 28.0  --    *  --  13

## 2021-01-04 NOTE — PAYOR COMM NOTE
--------------  DISCHARGE REVIEW    Payor: Jeff Persaud #:  [id number]  Authorization Number: [id number]    Admit date: 12/23/20  Admit time:  2253  Discharge Date: 12/31/2020  5:55 PM     Admitting Physician: Delfino Beverly, Marcello Pugh MD Consulting Physician  PULMONARY DISEASES           Physical Exam   Blood pressure 151/88, pulse 87, temperature 97.8 °F (36.6 °C), temperature source Axillary, resp.  rate 18, height 6' (1.829 m), weight 187 lb 12.8 oz (85.2 kg), SpO2 by mouth every 6 (six) hours as needed. Quantity:    Refills: 0     ALPRAZolam 0.5 MG Tabs  Commonly known as: XANAX      Take 1 tablet (0.5 mg total) by mouth 3 (three) times daily as needed for Sleep.    Quantity: 20 tablet  Refills: 0     amLODIPine Be

## 2021-03-12 DIAGNOSIS — Z23 NEED FOR VACCINATION: ICD-10-CM

## 2021-04-16 NOTE — ED NOTES
Orders for admission, patient is aware of plan and ready to go upstairs. Any questions, please call ED DARIAN Shafer  at extension [phone].    Type of COVID test sent: Covid+ from home  COVID Suspicion level: Covid+    Titratable drug(s) infusing:n/a  Rate:n/a talking together on the cell phone while we walk on treadmills or ride exercise bikes at home this can help encourage us to stick with a program to have accountability, or riding an exercise bike or walking on a treadmill or using an elliptical while watching our favorite television programs or movies for any period of time. For some people even 5 or 10 minutes 2 or 3 times a day would be extremely helpful. Omega 3 fatty acids such as are found in fish oil also raise the HDL. Each capsule of fish oil should have 800 mg or more of a combination of EPA & DHA - the active omega 3's- per capsule and you take between 1 to 4 caps per day until the goal is met. Omega 3's also lower triglycerides and help lubricate joints to lower arthritis pain. Hypomagnesemia  Take 250 mg 1/2 pill every other day. Vitamin D deficiency  -     Good control.  -     Continue meds and lifestyle control. Prediabetes  Exercise, weight loss and frequent small healthy balanced meals help prevent diabetes. Weekly weights can help you track your progress. Elevated CA-125  Near normal now. Obesity (BMI 35.0-39.9 without comorbidity)  Great choice to do weight watchers. Adenomatous polyp of ascending colon  Follow up with Dr Yvonne Masters. Other orders  -     Hepatitis C Antibody; Future next year. Advance Directives: Care Instructions  Overview  An advance directive is a legal way to state your wishes at the end of your life. It tells your family and your doctor what to do if you can't say what you want. There are two main types of advance directives. You can change them any time your wishes change. Living will. This form tells your family and your doctor your wishes about life support and other treatment. The form is also called a declaration. Medical power of . This form lets you name a person to make treatment decisions for you when you can't speak for yourself.  This person is called a health care agent (health care proxy, health care surrogate). The form is also called a durable power of  for health care. If you do not have an advance directive, decisions about your medical care may be made by a family member, or by a doctor or a  who doesn't know you. It may help to think of an advance directive as a gift to the people who care for you. If you have one, they won't have to make tough decisions by themselves. Follow-up care is a key part of your treatment and safety. Be sure to make and go to all appointments, and call your doctor if you are having problems. It's also a good idea to know your test results and keep a list of the medicines you take. What should you include in an advance directive? Many states have a unique advance directive form. (It may ask you to address specific issues.) Or you might use a universal form that's approved by many states. If your form doesn't tell you what to address, it may be hard to know what to include in your advance directive. Use the questions below to help you get started. · Who do you want to make decisions about your medical care if you are not able to? · What life-support measures do you want if you have a serious illness that gets worse over time or can't be cured? · What are you most afraid of that might happen? (Maybe you're afraid of having pain, losing your independence, or being kept alive by machines.)  · Where would you prefer to die? (Your home? A hospital? A nursing home?)  · Do you want to donate your organs when you die? · Do you want certain Restorationist practices performed before you die? When should you call for help? Be sure to contact your doctor if you have any questions. Where can you learn more? Go to [email or web address] org and sign in to your [employer] account. Enter R264 in the [employer] box to learn more about \"Advance Directives: Care Instructions. \"     If you do not have an account, please click on the \"Sign Up Now\" link. Current as of: July 17, 2020               Content Version: 12.8  © 2006-2021 [employer]. Care instructions adapted under license by [facility] ([facility]). If you have questions about a medical condition or this instruction, always ask your healthcare professional. [address] any warranty or liability for your use of this information. Body Mass Index: Care Instructions  Your Care Instructions     Body mass index (BMI) can help you see if your weight is raising your risk for health problems. It uses a formula to compare how much you weigh with how tall you are. · A BMI lower than 18.5 is considered underweight. · A BMI between 18.5 and 24.9 is considered healthy. · A BMI between 25 and 29.9 is considered overweight. A BMI of 30 or higher is considered obese. If your BMI is in the normal range, it means that you have a lower risk for weight-related health problems. If your BMI is in the overweight or obese range, you may be at increased risk for weight-related health problems, such as high blood pressure, heart disease, stroke, arthritis or joint pain, and diabetes. If your BMI is in the underweight range, you may be at increased risk for health problems such as fatigue, lower protection (immunity) against illness, muscle loss, bone loss, hair loss, and hormone problems. BMI is just one measure of your risk for weight-related health problems. You may be at higher risk for health problems if you are not active, you eat an unhealthy diet, or you drink too much alcohol or use tobacco products. Follow-up care is a key part of your treatment and safety. Be sure to make and go to all appointments, and call your doctor if you are having problems. It's also a good idea to know your test results and keep a list of the medicines you take. How can you care for yourself at home? · Practice healthy eating habits.  This includes eating plenty of fruits, questions about a medical condition or this instruction, always ask your healthcare professional. [address] any warranty or liability for your use of this information. Eating Healthy Foods: Care Instructions  Your Care Instructions     Eating healthy foods can help lower your risk for disease. Healthy food gives you energy and keeps your heart strong, your brain active, your muscles working, and your bones strong. A healthy diet includes a variety of foods from the basic food groups: grains, vegetables, fruits, milk and milk products, and meat and beans. Some people may eat more of their favorite foods from only one food group and, as a result, miss getting the nutrients they need. So, it is important to pay attention not only to what you eat but also to what you are missing from your diet. You can eat a healthy, balanced diet by making a few small changes. Follow-up care is a key part of your treatment and safety. Be sure to make and go to all appointments, and call your doctor if you are having problems. It's also a good idea to know your test results and keep a list of the medicines you take. How can you care for yourself at home? Look at what you eat  · Keep a food diary for a week or two and record everything you eat or drink. Track the number of servings you eat from each food group. · For a balanced diet every day, eat a variety of:  ? 6 or more ounce-equivalents of grains, such as cereals, breads, crackers, rice, or pasta, every day. An ounce-equivalent is 1 slice of bread, 1 cup of ready-to-eat cereal, or ½ cup of cooked rice, cooked pasta, or cooked cereal.  ? 2½ cups of vegetables, especially:  § Dark-green vegetables such as broccoli and spinach. § Orange vegetables such as carrots and sweet potatoes. § Dry beans (such as medina and kidney beans) and peas (such as lentils). ? 2 cups of fresh, frozen, or canned fruit.  A small apple or 1 banana or orange equals 1 cup.  ? 3 cups of nonfat or low-fat milk, yogurt, or other milk products. ? 5½ ounces of meat and beans, such as chicken, fish, lean meat, beans, nuts, and seeds. One egg, 1 tablespoon of peanut butter, ½ ounce nuts or seeds, or ¼ cup of cooked beans equals 1 ounce of meat. · Learn how to read food labels for serving sizes and ingredients. Fast-food and convenience-food meals often contain few or no fruits or vegetables. Make sure you eat some fruits and vegetables to make the meal more nutritious. · Look at your food diary. For each food group, add up what you have eaten and then divide the total by the number of days. This will give you an idea of how much you are eating from each food group. See if you can find some ways to change your diet to make it more healthy. Start small  · Do not try to make dramatic changes to your diet all at once. You might feel that you are missing out on your favorite foods and then be more likely to fail. · Start slowly, and gradually change your habits. Try some of the following:  ? Use whole wheat bread instead of white bread. ? Use nonfat or low-fat milk instead of whole milk. ? Eat brown rice instead of white rice, and eat whole wheat pasta instead of white-flour pasta. ? Try low-fat cheeses and low-fat yogurt. ? Add more fruits and vegetables to meals and have them for snacks. ? Add lettuce, tomato, cucumber, and onion to sandwiches. ? Add fruit to yogurt and cereal.  Enjoy food  · You can still eat your favorite foods. You just may need to eat less of them. If your favorite foods are high in fat, salt, and sugar, limit how often you eat them, but do not cut them out entirely. · Eat a wide variety of foods. Make healthy choices when eating out  · The type of restaurant you choose can help you make healthy choices. Even fast-food chains are now offering more low-fat or healthier choices on the menu. · Choose smaller portions, or take half of your meal home.   · When eating out, try:  ? A veggie pizza with a whole wheat crust or grilled chicken (instead of sausage or pepperoni). ? Pasta with roasted vegetables, grilled chicken, or marinara sauce instead of cream sauce. ? A vegetable wrap or grilled chicken wrap. ? Broiled or poached food instead of fried or breaded items. Make healthy choices easy  · Buy packaged, prewashed, ready-to-eat fresh vegetables and fruits, such as baby carrots, salad mixes, and chopped or shredded broccoli and cauliflower. · Buy packaged, presliced fruits, such as melon or pineapple. · Choose 100% fruit or vegetable juice instead of soda. Limit juice intake to 4 to 6 oz (½ to ¾ cup) a day. · Blend low-fat yogurt, fruit juice, and canned or frozen fruit to make a smoothie for breakfast or a snack. Where can you learn more? Go to https://Doodle.myZamana. org and sign in to your [employer] account. Enter U402 in the [employer] box to learn more about \"Eating Healthy Foods: Care Instructions. \"     If you do not have an account, please click on the \"Sign Up Now\" link. Current as of: December 17, 2020               Content Version: 12.8  © 1325-9060 Healthwise, Incorporated. Care instructions adapted under license by [facility] ([facility]). If you have questions about a medical condition or this instruction, always ask your healthcare professional. [place] [address] any warranty or liability for your use of this information. Personalized Preventive Plan for Dougie Atkinson - 4/16/2021  Medicare offers a range of preventive health benefits. Some of the tests and screenings are paid in full while other may be subject to a deductible, co-insurance, and/or copay. Some of these benefits include a comprehensive review of your medical history including lifestyle, illnesses that may run in your family, and various assessments and screenings as appropriate.     After reviewing your medical record and screening and assessments performed today your provider may have ordered immunizations, labs, imaging, and/or referrals for you. A list of these orders (if applicable) as well as your Preventive Care list are included within your After Visit Summary for your review. Other Preventive Recommendations:    · A preventive eye exam performed by an eye specialist is recommended every 1-2 years to screen for glaucoma; cataracts, macular degeneration, and other eye disorders. · A preventive dental visit is recommended every 6 months. · Try to get at least 150 minutes of exercise per week or 10,000 steps per day on a pedometer . · Order or download the FREE \"Exercise & Physical Activity: Your Everyday Guide\" from The [employer]. Call 4-284.190.7655 or search [employer] online. · You need 1545-7097 mg of calcium and 9328-3880 IU of vitamin D per day. It is possible to meet your calcium requirement with diet alone, but a vitamin D supplement is usually necessary to meet this goal.  · When exposed to the sun, use a sunscreen that protects against both UVA and UVB radiation with an SPF of 30 or greater. Reapply every 2 to 3 hours or after sweating, drying off with a towel, or swimming. · Always wear a seat belt when traveling in a car. Always wear a helmet when riding a bicycle or motorcycle. Heart-Healthy Diet   Sodium, Fat, and Cholesterol Controlled Diet       What Is a Heart Healthy Diet? A heart-healthy diet is one that limits sodium , certain types of fat , and cholesterol .  This type of diet is recommended for:   People with any form of cardiovascular disease (eg, coronary heart disease , peripheral vascular disease , previous heart attack , previous stroke )   People with risk factors for cardiovascular disease, such as high blood pressure , high cholesterol , or diabetes   Anyone who wants to lower their risk of developing cardiovascular disease   Sodium    Sodium is a mineral found in many foods. In general, most people consume much more sodium than they need. Diets high in sodium can increase blood pressure and lead to edema (water retention). On a heart-healthy diet, you should consume no more than 2,300 mg (milligrams) of sodium per dayabout the amount in one teaspoon of table salt. The foods highest in sodium include table salt (about 50% sodium), processed foods, convenience foods, and preserved foods. Cholesterol    Cholesterol is a fat-like, waxy substance in your blood. Our bodies make some cholesterol. It is also found in animal products, with the highest amounts in fatty meat, egg yolks, whole milk, cheese, shellfish, and organ meats. On a heart-healthy diet, you should limit your cholesterol intake to less than 200 mg per day. It is normal and important to have some cholesterol in your bloodstream. But too much cholesterol can cause plaque to build up within your arteries, which can eventually lead to a heart attack or stroke. The two types of cholesterol that are most commonly referred to are:   Low-density lipoprotein (LDL) cholesterol  Also known as bad cholesterol, this is the cholesterol that tends to build up along your arteries. Bad cholesterol levels are increased by eating fats that are saturated or hydrogenated. Optimal level of this cholesterol is less than 100. Over 130 starts to get risky for heart disease. High-density lipoprotein (HDL) cholesterol  Also known as good cholesterol, this type of cholesterol actually carries cholesterol away from your arteries and may, therefore, help lower your risk of having a heart attack. You want this level to be high (ideally greater than 60). It is a risk to have a level less than 40. You can raise this good cholesterol by eating olive oil, canola oil, avocados, or nuts. Exercise raises this level, too. Fat    Fat is calorie dense and packs a lot of calories into a small amount of food.  Even though fats should be limited due to their high calorie content, not all fats are bad. In fact, some fats are quite healthful. Fat can be broken down into four main types. The good-for-you fats are:   Monounsaturated fat  found in oils such as olive and canola, avocados, and nuts and natural nut butters; can decrease cholesterol levels, while keeping levels of HDL cholesterol high   Polyunsaturated fat  found in oils such as safflower, sunflower, soybean, corn, and sesame; can decrease total cholesterol and LDL cholesterol   Omega-3 fatty acids  particularly those found in fatty fish (such as salmon, trout, tuna, mackerel, herring, and sardines); can decrease risk of arrhythmias, decrease triglyceride levels, and slightly lower blood pressure   The fats that you want to limit are:   Saturated fat  found in animal products, many fast foods, and a few vegetables; increases total blood cholesterol, including LDL levels   Animal fats that are saturated include: butter, lard, whole-milk dairy products, meat fat, and poultry skin   Vegetable fats that are saturated include: hydrogenated shortening, palm oil, coconut oil, cocoa butter   Hydrogenated or trans fat  found in margarine and vegetable shortening, most shelf stable snack foods, and fried foods; increases LDL and decreases HDL     It is generally recommended that you limit your total fat for the day to less than 30% of your total calories. If you follow an 1800-calorie heart healthy diet, for example, this would mean 60 grams of fat or less per day. Saturated fat and trans fat in your diet raises your blood cholesterol the most, much more than dietary cholesterol does. For this reason, on a heart-healthy diet, less than 7% of your calories should come from saturated fat and ideally 0% from trans fat. On an 1800-calorie diet, this translates into less than 14 grams of saturated fat per day, leaving 46 grams of fat to come from mono- and polyunsaturated fats.    Food Choices on a Heart Healthy Diet   Food Category   Foods Recommended   Foods to Avoid   Grains   Breads and rolls without salted tops Most dry and cooked cereals Unsalted crackers and breadsticks Low-sodium or homemade breadcrumbs or stuffing All rice and pastas   Breads, rolls, and crackers with salted tops High-fat baked goods (eg, muffins, donuts, pastries) Quick breads, self-rising flour, and biscuit mixes Regular bread crumbs Instant hot cereals Commercially prepared rice, pasta, or stuffing mixes   Vegetables   Most fresh, frozen, and low-sodium canned vegetables Low-sodium and salt-free vegetable juices Canned vegetables if unsalted or rinsed   Regular canned vegetables and juices, including sauerkraut and pickled vegetables Frozen vegetables with sauces Commercially prepared potato and vegetable mixes   Fruits   Most fresh, frozen, and canned fruits All fruit juices   Fruits processed with salt or sodium   Milk   Nonfat or low-fat (1%) milk Nonfat or low-fat yogurt Cottage cheese, low-fat ricotta, cheeses labeled as low-fat and low-sodium   Whole milk Reduced-fat (2%) milk Malted and chocolate milk Full fat yogurt Most cheeses (unless low-fat and low salt) Buttermilk (no more than 1 cup per week)   Meats and Beans   Lean cuts of fresh or frozen beef, veal, lamb, or pork (look for the word loin) Fresh or frozen poultry without the skin Fresh or frozen fish and some shellfish Egg whites and egg substitutes (Limit whole eggs to three per week) Tofu Nuts or seeds (unsalted, dry-roasted), low-sodium peanut butter Dried peas, beans, and lentils   Any smoked, cured, salted, or canned meat, fish, or poultry (including mcintosh, chipped beef, cold cuts, hot dogs, sausages, sardines, and anchovies) Poultry skins Breaded and/or fried fish or meats Canned peas, beans, and lentils Salted nuts   Fats and Oils   Olive oil and canola oil Low-sodium, low-fat salad dressings and mayonnaise   Butter, margarine, coconut and palm oils, mcintosh fat Snacks, Sweets, and Condiments   Low-sodium or unsalted versions of broths, soups, soy sauce, and condiments Pepper, herbs, and spices; vinegar, lemon, or lime juice Low-fat frozen desserts (yogurt, sherbet, fruit bars) Sugar, cocoa powder, honey, syrup, jam, and preserves Low-fat, trans-fat free cookies, cakes, and pies Gaetano and animal crackers, fig bars, maico snaps   High-fat desserts Broth, soups, gravies, and sauces, made from instant mixes or other high-sodium ingredients Salted snack foods Canned olives Meat tenderizers, seasoning salt, and most flavored vinegars   Beverages   Low-sodium carbonated beverages Tea and coffee in moderation Soy milk   Commercially softened water   Suggestions   Make whole grains, fruits, and vegetables the base of your diet. Choose heart-healthy fats such as canola, olive, and flaxseed oil, and foods high in heart-healthy fats, such as nuts, seeds, soybeans, tofu, and fish. Eat fish at least twice per week; the fish highest in omega-3 fatty acids and lowest in mercury include salmon, herring, mackerel, sardines, and canned chunk light tuna. If you eat fish less than twice per week or have high triglycerides, talk to your doctor about taking fish oil supplements. Read food labels. For products low in fat and cholesterol, look for fat free, low-fat, cholesterol free, saturated fat free, and trans fat freeAlso scan the Nutrition Facts Label, which lists saturated fat, trans fat, and cholesterol amounts. For products low in sodium, look for sodium free, very low sodium, low sodium, no added salt, and unsalted   Skip the salt when cooking or at the table; if food needs more flavor, get creative and try out different herbs and spices. Garlic and onion also add substantial flavor to foods. Trim any visible fat off meat and poultry before cooking, and drain the fat off after farias.     Use cooking methods that require little or no added fat, such as grilling, boiling, baking, poaching, broiling, roasting, steaming, stir-frying, and sauting. Avoid fast food and convenience food. They tend to be high in saturated and trans fat and have a lot of added salt. Talk to a registered dietitian for individualized diet advice. Last Reviewed: March 2011 Billy Grace MS, MPH, RD   Updated: 3/29/2011   ·     Preventing Osteoporosis: After Your Visit  Your Care Instructions  Osteoporosis means the bones are weak and thin enough that they can break easily. The older you are, the more likely you are to get osteoporosis. But with plenty of calcium, vitamin D, and exercise, you can help prevent osteoporosis. The preteen and teen years are a key time for bone building. With the help of calcium, vitamin D, and exercise in those early years and beyond, the bones reach their peak density and strength by age 27. After age 27, your bones naturally start to thin and weaken. The stronger your bones are at around age 27, the lower your risk for osteoporosis. But no matter what your age and risk are, your bones still need calcium, vitamin D, and exercise to stay strong. Also avoid smoking, and limit alcohol. Smoking and heavy alcohol use can make your bones thinner. Talk to your doctor about any special risks you might have, such as having a close relative with osteoporosis or taking a medicine that can weaken bones. Your doctor can tell you the best ways to protect your bones from thinning. Follow-up care is a key part of your treatment and safety. Be sure to make and go to all appointments, and call your doctor if you are having problems. It's also a good idea to know your test results and keep a list of the medicines you take. How can you care for yourself at home? Get enough calcium and vitamin D. The [employer] recommends adults younger than age 46 need 1,000 mg of calcium and 600 IU of vitamin D each day.  Women ages 46 to 79 need 1,200 mg of calcium and 600 IU of vitamin D each day. Men ages 46 to 79 need 1,000 mg of calcium and 600 IU of vitamin D each day. Adults 71 and older need 1,200 mg of calcium and 800 IU of vitamin D each day. Eat foods rich in calcium, like yogurt, cheese, milk, and dark green vegetables. Eat foods rich in vitamin D, like eggs, fatty fish, cereal, and fortified milk. Get some sunshine. Your body uses sunshine to make its own vitamin D. The safest time to be out in the sun is before 10 a.m. or after 3 p.m. Avoid getting sunburned. Sunburn can increase your risk of skin cancer. Talk to your doctor about taking a calcium plus vitamin D supplement. Ask about what type of calcium is right for you, and how much to take at a time. Adults ages 23 to 48 should not get more than 2,500 mg of calcium and 4,000 IU of vitamin D each day, whether it is from supplements and/or food. Adults ages 46 and older should not get more than 2,000 mg of calcium and 4,000 IU of vitamin D each day from supplements and/or food. Get regular bone-building exercise. Weight-bearing and resistance exercises keep bones healthy by working the muscles and bones against gravity. Start out at an exercise level that feels right for you. Add a little at a time until you can do the following:  Do 30 minutes of weight-bearing exercise on most days of the week. Walking, jogging, stair climbing, and dancing are good choices. Do resistance exercises with weights or elastic bands 2 to 3 days a week. Limit alcohol. Drink no more than 1 alcohol drink a day if you are a woman. Drink no more than 2 alcohol drinks a day if you are a man. Do not smoke. Smoking can make bones thin faster. If you need help quitting, talk to your doctor about stop-smoking programs and medicines. These can increase your chances of quitting for good. When should you call for help? Watch closely for changes in your health, and be sure to contact your doctor if:  You need help with a healthy eating plan.   You need help with an exercise plan    © 4354-4351 Danni Tamez. Care instructions adapted under license by [facility]. This care instruction is for use with your licensed healthcare professional. If you have questions about a medical condition or this instruction, always ask your healthcare professional. [address] any warranty or liability for your use of this information. Content Version: 9.4.36772; Last Revised: June 20, 2011              ·     High-Fiber Diet     What Is Fiber? Dietary fiber is a form of carbohydrate found in plants that cannot be digested by humans. All plants contain fiber, including fruits, vegetables, grains, and legumes. Fiber is often classified into two categories: soluble and insoluble. Soluble fiber draws water into the bowel and can help slow digestion. Examples of foods that are high in soluble fiber include oatmeal, oat bran, barley, legumes (eg, beans and peas), apples, and strawberries. Insoluble fiber speeds digestion and can add bulk to the stool. Examples of foods that are high in insoluble fiber include whole-wheat products, wheat bran, cauliflower, green beans, and potatoes. Why Follow a High-Fiber Diet? A high-fiber diet is often recommended to prevent and treat constipation , hemorrhoids , diverticulitis , and irritable bowel syndrome . Eating a high-fiber diet can also help improve your cholesterol levels, lower your risk of coronary heart disease , reduce your risk of type 2 diabetes , and lower your weight. For people with type 1 or 2 diabetes, a high-fiber diet can also help stabilize blood sugar levels. How Much Fiber Should I Eat? A high-fiber diet should contain  20-35 grams  of fiber a day. This is actually the amount recommended for the general adult population; however, most Americans eat only 15 grams of fiber per day.    Digestion of Fiber   Eating a higher fiber diet than usual can take some getting used to by your body's digestive system. To avoid the side effects of sudden increases in dietary fiber (eg, gas, cramping, bloating, and diarrhea), increase fiber gradually and be sure to drink plenty of fluids every day. Tips for Increasing Fiber Intake   Whenever possible, choose whole grains over refined grains (eg, brown rice instead of white rice, whole-wheat bread instead of white bread). Include a variety of grains in your diet, such as wheat, rye, barley, oats, quinoa, and bulgur. Eat more vegetarian-based meals. Here are some ideas: black bean burgers, eggplant lasagna, and veggie tofu stir-johnson. Choose high-fiber snacks, such as fruits, popcorn, whole-grain crackers, and nuts. Make whole-grain cereal or whole-grain toast part of your daily breakfast regime. When eating out, whether ordering a sandwich or dinner, ask for extra vegetables. When baking, replace part of the white flour with whole-wheat flour. Whole-wheat flour is particularly easy to incorporate into a recipe. High-Fiber Diet Eating Guide   Food Category   Foods Recommended   Notes   Grains   Whole-grain breads, muffins, bagels, or osman bread Rye bread Whole-wheat crackers or crisp breads Whole-grain or bran cereals Oatmeal, oat bran, or grits Wheat germ Whole-wheat pasta and brown rice   Read the ingredients list on food labels. Look for products that list \"whole\" as the first ingredient (eg, whole-wheat, whole oats). Choose cereals with at least 2 grams of fiber per serving.    Vegetables   All vegetables, especially asparagus, bean sprouts, broccoli, [place] sprouts, cabbage, carrots, cauliflower, celery, corn, greens, green beans, green pepper, onions, peas, potatoes (with skin), snow peas, spinach, squash, sweet potatoes, tomatoes, zucchini   For maximum fiber intake, eat the peels of fruits and vegetablesjust be sure to wash them well first.   Fruits   All fruits, especially apples, berries, grapefruits, mangoes, SocializingVisit old friends or join groups to meet new ones. Reading   Learning a new language   Taking a class, whether it be art history or fely chi   TravelingExperience the food, history, and culture of your destination   Learning to use a computer   Going to museums, the theater, or thought-provoking movies   Changing things in your daily life, such as reversing your pattern in the grocery store or brushing your teeth using your nondominant hand   Use Memory Aids   There is no need to remember every detail on your own. These memory aids can help:   Calendars and day planners   Electronic organizers to store all sorts of helpful informationThese devices can \"beep\" to remind you of appointments. A book of days to record birthdays, anniversaries, and other occasions that occur on the same date every year   Detailed \"to-do\" lists and strategically placed sticky notes   Quick \"study\" sessionsBefore a gathering, review who will be there so their names will be fresh in your mind. Establish routinesFor example, keep your keys, wallet, and umbrella in the same place all the time or take medicine with your 8:00 AM glass of juice   Live a Healthy Life   Many actions that will keep your body strong will do the same for your mind. For example:   Talk to Your Doctor About Herbs and Supplements    Malnutrition and vitamin deficiencies can impair your mental function. For example, vitamin B12 deficiency can cause a range of symptoms, including confusion. But, what if your nutritional needs are being met? Can herbs and supplements still offer a benefit? Researchers have investigated a range of natural remedies, such as ginkgo , ginseng , and the supplement phosphatidylserine (PS). So far, though, the evidence is inconsistent as to whether these products can improve memory or thinking.    If you are interested in taking herbs and supplements, talk to your doctor first because they may interact with other medicines that Research by the Association of Aging [facility]) shows that some home accidents among older adults can be prevented by making simple lifestyle changes and basic modifications and repairs to the home environment. Here are some lifestyle changes that experts recommend:   Have your hearing and vision checked regularly. Be sure to wear prescription glasses that are right for you. Speak to your doctor or pharmacist about the possible side effects of your medicines. A number of medicines can cause dizziness. If you have problems with sleep, talk to your doctor. Limit your intake of alcohol. If necessary, use a cane or walker to help maintain your balance. Wear supportive, rubber-soled shoes, even at home. If you live in a region that gets wintry weather, you may want to put special cleats on your shoes to prevent you from slipping on the snow and ice. Exercise regularly to help maintain muscle tone, agility, and balance. Always hold the banister when going up or down stairs. Also, use  bars when getting in or out of the bath or shower, or using the toilet. To avoid dizziness, get up slowly from a lying down position. Sit up first, dangling your legs for a minute or two before rising to a standing position. Overall Home Safety Check   According to the Consumer Product Safety Commision's \"Older Consumer Home Safety Checklist,\" it is important to check for potential hazards in each room. And remember, proper lighting is an essential factor in home safety. If you cannot see clearly, you are more likely to fall. Important questions to ask yourself include:   Are lamp, electric, extension, and telephone cords placed out of the flow of traffic and maintained in good condition? Have frayed cords been replaced? Are all small rugs and runners slip resistant? If not, you can secure them to the floor with a special double-sided carpet tape.    Are smoke detectors properly locatedone on every floor of your home overloaded circuits. Never smoke in bed or when lying down on a couch or recliner chair. Small portable electric or kerosene heaters are responsible for many home fires and should be used cautiously if at all. If you do use one, be sure to keep them away from flammable materials. In case of fire, make sure you have a pre-established emergency exit plan. Have a professional check your fireplace and other fuel-burning appliances yearly. Helping Hands   Baby boomers entering the pearce years will continue to see the development of new products to help older adults live safely and independently in spite of age-related changes. Making Life More Livable  , by Dominic Figueroa, lists over 1,000 products for \"living well in the mature years,\" such as bathing and mobility aids, household security devices, ergonomically designed knives and peelers, and faucet valves and knobs for temperature control. Medical supply stores and organizations are good sources of information about products that improve your quality of life and insure your safety. Last Reviewed: November 2009 Rosalino Rendon MD   Updated: 3/7/2011   Patient Education        Well Visit, Over 72: Care Instructions  Overview     Well visits can help you stay healthy. Your doctor has checked your overall health and may have suggested ways to take good care of yourself. Your doctor also may have recommended tests. At home, you can help prevent illness with healthy eating, regular exercise, and other steps. Follow-up care is a key part of your treatment and safety. Be sure to make and go to all appointments, and call your doctor if you are having problems. It's also a good idea to know your test results and keep a list of the medicines you take. How can you care for yourself at home? Get screening tests that you and your doctor decide on. Screening helps find diseases before any symptoms appear. Eat healthy foods.  Choose fruits, vegetables, whole grains, protein, and low-fat dairy foods. Limit fat, especially saturated fat. Reduce salt in your diet. Limit alcohol. If you are a man, have no more than 2 drinks a day or 14 drinks a week. If you are a woman, have no more than 1 drink a day or 7 drinks a week. Since alcohol affects older adults differently, you may want to limit alcohol even more. Or you may not want to drink at all. Get at least 30 minutes of exercise on most days of the week. Walking is a good choice. You also may want to do other activities, such as running, swimming, cycling, or playing tennis or team sports. Reach and stay at a healthy weight. This will lower your risk for many problems, such as obesity, diabetes, heart disease, and high blood pressure. Do not smoke. Smoking can make health problems worse. If you need help quitting, talk to your doctor about stop-smoking programs and medicines. These can increase your chances of quitting for good. Care for your mental health. It is easy to get weighed down by worry and stress. Learn strategies to manage stress, like deep breathing and mindfulness, and stay connected with your family and community. If you find you often feel sad or hopeless, talk with your doctor. Treatment can help. Talk to your doctor about whether you have any risk factors for sexually transmitted infections (STIs). You can help prevent STIs if you wait to have sex with a new partner (or partners) until you've each been tested for STIs. It also helps if you use condoms (male or female condoms) and if you limit your sex partners to one person who only has sex with you. Vaccines are available for some STIs. If you think you may have a problem with alcohol or drug use, talk to your doctor. This includes prescription medicines (such as amphetamines and opioids) and illegal drugs (such as cocaine and methamphetamine). Your doctor can help you figure out what type of treatment is best for you.   Protect your skin from too much sun. When you're outdoors from 10 a.m. to 4 p.m., stay in the shade or cover up with clothing and a hat with a wide brim. Wear sunglasses that block UV rays. Even when it's cloudy, put broad-spectrum sunscreen (SPF 30 or higher) on any exposed skin. See a dentist one or two times a year for checkups and to have your teeth cleaned. Wear a seat belt in the car. When should you call for help? Watch closely for changes in your health, and be sure to contact your doctor if you have any problems or symptoms that concern you. Where can you learn more? Go to https://Wi3peNimblefish Technologieseb.Synapsify. org and sign in to your i-drive account. Enter Y869 in the CyberArts box to learn more about \"Well Visit, Over 65: Care Instructions. \"     If you do not have an account, please click on the \"Sign Up Now\" link. Current as of: May 27, 2020               Content Version: 12.8  © 2006-2021 Healthwise, Incorporated. Care instructions adapted under license by [facility] ([facility]). If you have questions about a medical condition or this instruction, always ask your healthcare professional. [address] any warranty or liability for your use of this information.

## 2021-12-29 NOTE — PROGRESS NOTES
[facility], [facility] - Gastroenterology                                                                                                  Clinic Progress Note    Patient pr screening 4/29/09   • Prostate cancer [facility])    • Renal stones    • Screening PSA (prostate specific antigen) 5/9/09      Past Surgical History:   Procedure Laterality Date   • COLONOSCOPY N/A 1/24/2020    Procedure: COLONOSCOPY;  Surgeon: Eldon Bailey 157/76, pulse 68, height 6' (1.829 m), weight 211 lb (95.7 kg).     General:awake, cooperative, no acute distress  HEENT: EOMI, no scleral icterus, MMM; oral pharnyx is without exudates or lesions  Neck: no lymphadenopathy; thyroid is not enlarged and witho extensively as it was back last year as well. We discussed again, as discussed before he should make an appointment with a cardiologist given his prior abnormal echocardiogram and have given him referal/contact information for this today.  I've also given h

## 2021-12-30 ENCOUNTER — OFFICE VISIT (OUTPATIENT)
Dept: GASTROENTEROLOGY | Facility: CLINIC | Age: 86
End: 2021-12-30
Payer: MEDICARE

## 2021-12-30 ENCOUNTER — TELEPHONE (OUTPATIENT)
Dept: GASTROENTEROLOGY | Facility: CLINIC | Age: 86
End: 2021-12-30

## 2021-12-30 VITALS
HEART RATE: 68 BPM | BODY MASS INDEX: 28.58 KG/M2 | WEIGHT: 211 LBS | SYSTOLIC BLOOD PRESSURE: 157 MMHG | DIASTOLIC BLOOD PRESSURE: 76 MMHG | HEIGHT: 72 IN

## 2021-12-30 DIAGNOSIS — N50.9 SCROTAL LESION: ICD-10-CM

## 2021-12-30 DIAGNOSIS — D12.8 ADENOMATOUS RECTAL POLYP: Primary | ICD-10-CM

## 2021-12-30 DIAGNOSIS — D12.8 ADENOMATOUS RECTAL POLYP: ICD-10-CM

## 2021-12-30 DIAGNOSIS — N50.9 SCROTAL LESION: Primary | ICD-10-CM

## 2021-12-30 PROCEDURE — 99214 OFFICE O/P EST MOD 30 MIN: CPT | Performed by: INTERNAL MEDICINE

## 2021-12-30 RX ORDER — LOSARTAN POTASSIUM 100 MG/1
100 TABLET ORAL DAILY
COMMUNITY
Start: 2021-12-13

## 2021-12-30 RX ORDER — ZOLPIDEM TARTRATE 10 MG/1
10 TABLET ORAL NIGHTLY
COMMUNITY
Start: 2021-10-09

## 2021-12-30 RX ORDER — HYDROCHLOROTHIAZIDE 12.5 MG/1
12.5 TABLET ORAL DAILY
COMMUNITY
Start: 2021-09-22

## 2021-12-30 RX ORDER — PANTOPRAZOLE SODIUM 40 MG/1
40 TABLET, DELAYED RELEASE ORAL DAILY
COMMUNITY
Start: 2021-09-03

## 2021-12-30 NOTE — PATIENT INSTRUCTIONS
Doctor rob Tan Breath jhony ivana con un cardiólogo llamando at [address] [facility] at [address] [place] [address]  Phone: [phone]  Fax: [phone]    Doctor en urologia  Concierte jhony ivana con un m

## 2021-12-30 NOTE — TELEPHONE ENCOUNTER
Scheduled for:  Colonoscopy [phone] w/ EMR 25131- Ok'd Per MG  Provider Name:  Dr Sharyle Flatness  Date:  Monday 02/07/2022  Location:  [facility]  Sedation:  MAC  Time:  1:30pm ( pt is aware arrival time at 12:30pm)  Prep:  Golytely  Meds/Allergies Reconciled?:  Physici

## 2022-01-27 ENCOUNTER — TELEPHONE (OUTPATIENT)
Dept: GASTROENTEROLOGY | Facility: CLINIC | Age: 87
End: 2022-01-27

## 2022-02-01 NOTE — TELEPHONE ENCOUNTER
Call received from Jerson Banuelos in PAT endo stating patient wants to reschedule procedure on 2/7 as he needs cardiac clearance and wants an appt that is earlier in the day. Patient was removed from endo's schedule.  Please call Andrew Lazaro - daughter to reschedule procedure (see TE 12/30/21)

## 2022-02-03 NOTE — TELEPHONE ENCOUNTER
Patient needs to be rescheduled. PAT/ENDO note:    Patient's daughter-in-law Marianne Tinajero called in to state  that \"my father-in- law Delmas Gosselin wants to cancel his procedure\" scheduled for 2-7-2022. Refugio Herbert states that the patient wants to cancel because \"he needs cardiac clearance that is scheduled for 2-2-2022\" and that the patient \"does not want to come in that late in the day\" for his procedure. Refugio Herbert also states that the patient's daughter Royal Mata ([phone]) usually handles the patient's medical care. Instructed Claire to call Dr Jo Coma office to cancel the procedure for 2-7-2022 per patient's request and then reschedule for a morning appointment for a later date per patient's request. Refugio Herbert verbalizes understanding and agrees.

## 2022-02-04 ENCOUNTER — TELEPHONE (OUTPATIENT)
Dept: GASTROENTEROLOGY | Facility: CLINIC | Age: 87
End: 2022-02-04

## 2022-02-04 NOTE — TELEPHONE ENCOUNTER
WICHO on Janet's line to schedule her fathers procedure.  Please transfer to [facility] in GI

## 2022-02-07 NOTE — TELEPHONE ENCOUNTER
Scheduled for:  Colonoscopy w/EMR 60826  Provider Name:  Dr. Peggy Jeans  Date:  4/18/22  Location:    [facility]  Sedation:  MAC  Time:  0800 (pt is aware to arrive at 0700)   Prep:  Clover, sent English instructions via Dwellable on 2/7/22  Meds/Allergies Reconciled?:  Physician reviewed   Diagnosis with codes:  Adenomatous rectal polyps D12.8, Scrotal lesion N50.9  Was patient informed to call insurance with codes (Y/N):  Yes, I confirmed Medicare insurance with the patient. Referral sent?:  Referral was sent at the time of electronic surgical scheduling. [facility] or [address] notified?:  I sent an electronic request to Endo Scheduling and received a confirmation today. Medication Orders: This patient verbally confirmed that he is not taking:   Iron, blood thinners and is not diabetic   Not latex allergy, Not PCN allergy and does not have a pacemaker   This patient is aware to HOLD his BP meds (Losartan-AM) the day of the procedure     This patient is aware to HOLD all supplements x 7 days before the procedure. Misc Orders:       Further instructions given by staff:    This patient had no questions regarding the prep instructions

## 2022-04-13 ENCOUNTER — TELEPHONE (OUTPATIENT)
Dept: GASTROENTEROLOGY | Facility: CLINIC | Age: 87
End: 2022-04-13

## 2022-04-14 NOTE — TELEPHONE ENCOUNTER
Dr. Tom Verma. .. This patients daughter canceled his procedure. He has canceled several times.     You may close this TE when done :)

## 2022-08-19 NOTE — H&P (VIEW-ONLY)
[facility] [address]   Gastroenterology Consultation Note     [place]  Patient Status:    Inpatient  Date of Admission:         1/22/2020, Hospital day #1  Attending:   Lisa Chase  PCP:     Omid Vegas MD    Re See telephone encounter 8/19/22. polyethylene glycol (GOLYTELY) solution 2,000 mL, 2,000 mL, Oral, Q10H  •  Normal Saline Flush 0.9 % injection 3 mL, 3 mL, Intravenous, PRN  •  0.9% NaCl infusion, , Intravenous, Continuous  •  acetaminophen (TYLENOL) tab 650 mg, 650 mg, Oral, Q6H PRN  • radiation seed placement who was admitted 1/22 after presenting to the ED with blood per rectum    Uncertain source of bleeding especially with his uncertain history of recent colonoscopy in [facility] in November.  Possibly malignancy, hemorrhoids, radiation p to prolonged hospital stay or surgical intervention. All questions were answered to the patient’s satisfaction. The patient elected to proceed with EGD with intervention [i.e. Biopsy, dilatation, control of bleeding, etc.] as indicated.     Discussed with eva

## 2023-02-14 NOTE — PROGRESS NOTES
Sx onset- 14-16 days prior to admission  Covid positive- 12/24  Admitted- 12/24     CXR- 12/23- Bilateral Covid pneumonia  Chest CT- 12/24- Negative PE--Ectatic aorta with ulceration, needs follow up     DVT prophylaxis- Lovenox 40mg daily & SCDs     02- 3 Size Of Lesion In Cm: 0.4

## 2023-05-05 ENCOUNTER — HOSPITAL ENCOUNTER (OUTPATIENT)
Dept: CT IMAGING | Facility: HOSPITAL | Age: 88
Discharge: HOME | End: 2023-05-05
Attending: INTERNAL MEDICINE
Payer: MEDICARE

## 2023-05-05 DIAGNOSIS — R19.00 ABDOMINAL LUMP: ICD-10-CM

## 2023-05-05 LAB
CREAT BLD-MCNC: 1.6 MG/DL
GFR SERPLBLD BASED ON 1.73 SQ M-ARVRAT: 42 ML/MIN/1.73M2 (ref 60–?)

## 2023-05-05 PROCEDURE — 74177 CT ABD & PELVIS W/CONTRAST: CPT | Performed by: INTERNAL MEDICINE

## 2023-05-05 PROCEDURE — 82565 ASSAY OF CREATININE: CPT

## 2023-06-30 ENCOUNTER — HOSPITAL ENCOUNTER (OUTPATIENT)
Dept: MRI IMAGING | Age: 88
Discharge: HOME | End: 2023-06-30
Attending: INTERNAL MEDICINE
Payer: MEDICARE

## 2023-06-30 DIAGNOSIS — K86.9 DISEASE OF PANCREAS, UNSPECIFIED: ICD-10-CM

## 2023-06-30 PROCEDURE — 74183 MRI ABD W/O CNTR FLWD CNTR: CPT | Performed by: INTERNAL MEDICINE

## 2023-06-30 PROCEDURE — 76376 3D RENDER W/INTRP POSTPROCES: CPT | Performed by: INTERNAL MEDICINE

## 2023-06-30 PROCEDURE — A9575 INJ GADOTERATE MEGLUMI 0.1ML: HCPCS | Performed by: INTERNAL MEDICINE

## 2023-06-30 RX ORDER — GADOTERATE MEGLUMINE 376.9 MG/ML
20 INJECTION INTRAVENOUS
Status: COMPLETED | OUTPATIENT
Start: 2023-06-30 | End: 2023-06-30

## 2023-06-30 RX ADMIN — GADOTERATE MEGLUMINE 19 ML: 376.9 INJECTION INTRAVENOUS at 12:40:00

## 2023-07-25 ENCOUNTER — TELEPHONE (OUTPATIENT)
Dept: HEMATOLOGY/ONCOLOGY | Facility: HOSPITAL | Age: 88
End: 2023-07-25

## 2023-07-25 NOTE — TELEPHONE ENCOUNTER
----- Message from Berta Khalil sent at 7/25/2023 10:13 AM CDT -----  Regarding: Ref to michelle ref by Waqar Richmond  neoplasm of liver  Medicare AB primary

## 2023-08-10 ENCOUNTER — TELEPHONE (OUTPATIENT)
Dept: HEMATOLOGY/ONCOLOGY | Facility: HOSPITAL | Age: 88
End: 2023-08-10

## 2023-08-11 ENCOUNTER — TELEPHONE (OUTPATIENT)
Dept: HEMATOLOGY/ONCOLOGY | Facility: HOSPITAL | Age: 88
End: 2023-08-11

## 2023-08-11 NOTE — TELEPHONE ENCOUNTER
Rosalba I spoke with patients daughter this morning. He was diagnosed over 10 years ago with prostate cancer here. He did radiation with dr Keven Rodriguez. That is all I can see .  Daughter does not know the name of urologist. Champ Schwab

## 2023-08-15 ENCOUNTER — LAB ENCOUNTER (OUTPATIENT)
Dept: LAB | Facility: HOSPITAL | Age: 88
End: 2023-08-15
Attending: INTERNAL MEDICINE
Payer: MEDICARE

## 2023-08-15 ENCOUNTER — TELEPHONE (OUTPATIENT)
Dept: HEMATOLOGY/ONCOLOGY | Facility: HOSPITAL | Age: 88
End: 2023-08-15

## 2023-08-15 ENCOUNTER — OFFICE VISIT (OUTPATIENT)
Dept: HEMATOLOGY/ONCOLOGY | Facility: HOSPITAL | Age: 88
End: 2023-08-15
Attending: INTERNAL MEDICINE
Payer: MEDICARE

## 2023-08-15 VITALS
TEMPERATURE: 98 F | SYSTOLIC BLOOD PRESSURE: 176 MMHG | WEIGHT: 197 LBS | HEIGHT: 66.54 IN | BODY MASS INDEX: 31.29 KG/M2 | DIASTOLIC BLOOD PRESSURE: 65 MMHG | RESPIRATION RATE: 18 BRPM | OXYGEN SATURATION: 99 % | HEART RATE: 58 BPM

## 2023-08-15 DIAGNOSIS — Z85.46 HISTORY OF PROSTATE CANCER: ICD-10-CM

## 2023-08-15 DIAGNOSIS — K76.9 LIVER LESION: ICD-10-CM

## 2023-08-15 DIAGNOSIS — K76.9 LIVER LESION: Primary | ICD-10-CM

## 2023-08-15 LAB
ALBUMIN SERPL-MCNC: 3.7 G/DL (ref 3.4–5)
ALBUMIN/GLOB SERPL: 1 {RATIO} (ref 1–2)
ALP LIVER SERPL-CCNC: 101 U/L
ALT SERPL-CCNC: 24 U/L
ANION GAP SERPL CALC-SCNC: 4 MMOL/L (ref 0–18)
APTT PPP: 33.6 SECONDS (ref 23.3–35.6)
AST SERPL-CCNC: 24 U/L (ref 15–37)
BASOPHILS # BLD AUTO: 0.04 X10(3) UL (ref 0–0.2)
BASOPHILS NFR BLD AUTO: 0.5 %
BILIRUB SERPL-MCNC: 0.4 MG/DL (ref 0.1–2)
BUN BLD-MCNC: 23 MG/DL (ref 7–18)
BUN/CREAT SERPL: 16.2 (ref 10–20)
CALCIUM BLD-MCNC: 8.9 MG/DL (ref 8.5–10.1)
CHLORIDE SERPL-SCNC: 105 MMOL/L (ref 98–112)
CO2 SERPL-SCNC: 30 MMOL/L (ref 21–32)
CREAT BLD-MCNC: 1.42 MG/DL
DEPRECATED RDW RBC AUTO: 43 FL (ref 35.1–46.3)
EGFRCR SERPLBLD CKD-EPI 2021: 48 ML/MIN/1.73M2 (ref 60–?)
EOSINOPHIL # BLD AUTO: 0.24 X10(3) UL (ref 0–0.7)
EOSINOPHIL NFR BLD AUTO: 3.2 %
ERYTHROCYTE [DISTWIDTH] IN BLOOD BY AUTOMATED COUNT: 12.9 % (ref 11–15)
FASTING STATUS PATIENT QL REPORTED: NO
GLOBULIN PLAS-MCNC: 3.7 G/DL (ref 2.8–4.4)
GLUCOSE BLD-MCNC: 92 MG/DL (ref 70–99)
HCT VFR BLD AUTO: 37.4 %
HGB BLD-MCNC: 12.7 G/DL
IMM GRANULOCYTES # BLD AUTO: 0.02 X10(3) UL (ref 0–1)
IMM GRANULOCYTES NFR BLD: 0.3 %
INR BLD: 1.12 (ref 0.85–1.16)
LYMPHOCYTES # BLD AUTO: 1.98 X10(3) UL (ref 1–4)
LYMPHOCYTES NFR BLD AUTO: 26.3 %
MCH RBC QN AUTO: 30.8 PG (ref 26–34)
MCHC RBC AUTO-ENTMCNC: 34 G/DL (ref 31–37)
MCV RBC AUTO: 90.8 FL
MONOCYTES # BLD AUTO: 0.82 X10(3) UL (ref 0.1–1)
MONOCYTES NFR BLD AUTO: 10.9 %
NEUTROPHILS # BLD AUTO: 4.44 X10 (3) UL (ref 1.5–7.7)
NEUTROPHILS # BLD AUTO: 4.44 X10(3) UL (ref 1.5–7.7)
NEUTROPHILS NFR BLD AUTO: 58.8 %
OSMOLALITY SERPL CALC.SUM OF ELEC: 291 MOSM/KG (ref 275–295)
PLATELET # BLD AUTO: 218 10(3)UL (ref 150–450)
POTASSIUM SERPL-SCNC: 4.1 MMOL/L (ref 3.5–5.1)
PROT SERPL-MCNC: 7.4 G/DL (ref 6.4–8.2)
PROTHROMBIN TIME: 14.3 SECONDS (ref 11.6–14.8)
PSA SERPL-MCNC: 0.02 NG/ML (ref ?–4)
RBC # BLD AUTO: 4.12 X10(6)UL
SODIUM SERPL-SCNC: 139 MMOL/L (ref 136–145)
WBC # BLD AUTO: 7.5 X10(3) UL (ref 4–11)

## 2023-08-15 PROCEDURE — 36415 COLL VENOUS BLD VENIPUNCTURE: CPT

## 2023-08-15 PROCEDURE — 85610 PROTHROMBIN TIME: CPT

## 2023-08-15 PROCEDURE — 85730 THROMBOPLASTIN TIME PARTIAL: CPT

## 2023-08-15 PROCEDURE — 99205 OFFICE O/P NEW HI 60 MIN: CPT | Performed by: INTERNAL MEDICINE

## 2023-08-15 PROCEDURE — 80053 COMPREHEN METABOLIC PANEL: CPT

## 2023-08-15 PROCEDURE — 84153 ASSAY OF PSA TOTAL: CPT

## 2023-08-15 PROCEDURE — 85025 COMPLETE CBC W/AUTO DIFF WBC: CPT

## 2023-08-15 NOTE — TELEPHONE ENCOUNTER
Called and spoke with patient's daughter, Luna Mayo to let her know I was able to schedule the patient's CT scan for tomorrow, 8/16 at 4:00 pm at the [address] in [address] her to park in green parking lot and enter at the [place], then make a right and check in at [place] [id number] her they need to arrive and check in by 2:45 pm tomorrow, 8/16, so he can drink the oral contrast. Patient's daughter stated understanding and requested I send them the instructions. Told her I can send a My Chart message with the instructions. She stated understanding and thanked me for the call.

## 2023-08-16 ENCOUNTER — HOSPITAL ENCOUNTER (OUTPATIENT)
Dept: CT IMAGING | Facility: HOSPITAL | Age: 88
Discharge: HOME | End: 2023-08-16
Attending: INTERNAL MEDICINE
Payer: MEDICARE

## 2023-08-16 ENCOUNTER — TELEPHONE (OUTPATIENT)
Dept: INTERVENTIONAL RADIOLOGY/VASCULAR | Facility: HOSPITAL | Age: 88
End: 2023-08-16

## 2023-08-16 DIAGNOSIS — K76.9 LIVER LESION: ICD-10-CM

## 2023-08-16 PROCEDURE — 71260 CT THORAX DX C+: CPT | Performed by: INTERNAL MEDICINE

## 2023-08-16 PROCEDURE — 74177 CT ABD & PELVIS W/CONTRAST: CPT | Performed by: INTERNAL MEDICINE

## 2023-08-16 RX ORDER — SODIUM CHLORIDE 9 MG/ML
INJECTION, SOLUTION INTRAVENOUS CONTINUOUS
Status: CANCELLED | OUTPATIENT
Start: 2023-08-16

## 2023-08-17 ENCOUNTER — HOSPITAL ENCOUNTER (OUTPATIENT)
Dept: INTERVENTIONAL RADIOLOGY/VASCULAR | Facility: HOSPITAL | Age: 88
Discharge: HOME | End: 2023-08-17
Attending: INTERNAL MEDICINE | Admitting: STUDENT IN AN ORGANIZED HEALTH CARE EDUCATION/TRAINING PROGRAM
Payer: MEDICARE

## 2023-08-17 VITALS
BODY MASS INDEX: 31.29 KG/M2 | WEIGHT: 197 LBS | RESPIRATION RATE: 18 BRPM | SYSTOLIC BLOOD PRESSURE: 160 MMHG | TEMPERATURE: 98 F | DIASTOLIC BLOOD PRESSURE: 99 MMHG | HEIGHT: 66.5 IN | OXYGEN SATURATION: 98 % | HEART RATE: 65 BPM

## 2023-08-17 DIAGNOSIS — K76.9 LIVER LESION: ICD-10-CM

## 2023-08-17 PROCEDURE — 88333 PATH CONSLTJ SURG CYTO XM 1: CPT | Performed by: INTERNAL MEDICINE

## 2023-08-17 PROCEDURE — 88307 TISSUE EXAM BY PATHOLOGIST: CPT | Performed by: INTERNAL MEDICINE

## 2023-08-17 PROCEDURE — 47000 NEEDLE BIOPSY OF LIVER PERQ: CPT

## 2023-08-17 PROCEDURE — 99152 MOD SED SAME PHYS/QHP 5/>YRS: CPT

## 2023-08-17 PROCEDURE — 88342 IMHCHEM/IMCYTCHM 1ST ANTB: CPT | Performed by: INTERNAL MEDICINE

## 2023-08-17 PROCEDURE — 76942 ECHO GUIDE FOR BIOPSY: CPT

## 2023-08-17 PROCEDURE — 0FB03ZX EXCISION OF LIVER, PERCUTANEOUS APPROACH, DIAGNOSTIC: ICD-10-PCS | Performed by: STUDENT IN AN ORGANIZED HEALTH CARE EDUCATION/TRAINING PROGRAM

## 2023-08-17 PROCEDURE — 88341 IMHCHEM/IMCYTCHM EA ADD ANTB: CPT | Performed by: INTERNAL MEDICINE

## 2023-08-17 PROCEDURE — 88334 PATH CONSLTJ SURG CYTO XM EA: CPT | Performed by: INTERNAL MEDICINE

## 2023-08-17 RX ORDER — MIDAZOLAM HYDROCHLORIDE 1 MG/ML
INJECTION INTRAMUSCULAR; INTRAVENOUS
Status: COMPLETED
Start: 2023-08-17 | End: 2023-08-17

## 2023-08-17 RX ORDER — SODIUM CHLORIDE 9 MG/ML
INJECTION, SOLUTION INTRAVENOUS CONTINUOUS
Status: DISCONTINUED | OUTPATIENT
Start: 2023-08-17 | End: 2023-08-17

## 2023-08-17 RX ORDER — OMEPRAZOLE 40 MG/1
40 CAPSULE, DELAYED RELEASE ORAL
COMMUNITY
Start: 2023-07-25

## 2023-08-17 NOTE — DISCHARGE INSTRUCTIONS
Pr-14  4.2  [phone]     Patient Name:  Leona    Procedure:  Liver Biopsy by Dr. Brown Rack    Site Care: Remove your band-aid or dressing in 24 hours. Gently wash area with soap and water. Activity/Diet  No heavy lifting or strenuous activity for 48 hours. Do not shower for 24 hours  Drink plenty of fluids, unless you have otherwise been told to restrict your fluid intake. Do not drink alcohol for 24 hours. (No gisele alcohol codie las proximas 24 horas)  Do not drive,  operate heavy machinery, make important decisions or sign legal documents today. (No conduzca, no opere maquinaria peligrosa ni tome decisiones importantes personales o de negocios codie las siguientes 24 horas)    Medications: Take acetaminophen if needed for pain. Do not exceed 4000mg of acetaminophen in a 24-hour period. , Do not take blood thinners, aspirin, or Plavix for 24 hours. , and Make no changes to your existing medications. Contact Interventional Radiology at [phone] if you have severe/unrelieved pain, fever, chills, dizziness/lightheadedness, or drainage/bleeding from your incision site.

## 2023-08-17 NOTE — PROCEDURES
Interventional Radiology  Brief Post-Procedure Note    Procedure(s): liver biopsy    Indication: liver mass, pancreatic mass    Anesthesia: Sedation    Findings:  2x 18g cores from liver lesion    Blood loss: <5 mL      Complications: None    Plan: follow up pathology    Please refer to full dictation under the \"Imaging\" tab in Epic.     Shanita Rosario MD  8/17/2023  [address] [place] [address]

## 2023-08-17 NOTE — IVS NOTE
DISCHARGE NOTE     Pt is able to sit up and ambulate without difficulty. Pt declined fluids and food. Procedural site remains dry and intact with good circulation, motion, and sensation. No signs and symptoms of bleeding/hematoma noted. IV access removed  Instruction provided, patient/family verbalizes understanding. Dr. Price Mom spoke with patient/family post procedure.      Pt discharge via wheelchair to [facility]

## 2023-08-17 NOTE — INTERVAL H&P NOTE
The above referenced H&P was reviewed by Murleen Mcburney, MD on 8/17/2023, the patient was examined and no significant changes have occurred in the patient's condition since the H&P was performed. Risks, benefits, alternative treatments and consequences of no treatment were discussed. We will proceed with procedure as planned.       Murleen Mcburney, MD  8/17/2023  12:12 PM

## 2023-08-18 ENCOUNTER — TELEPHONE (OUTPATIENT)
Dept: HEMATOLOGY/ONCOLOGY | Facility: HOSPITAL | Age: 88
End: 2023-08-18

## 2023-08-18 DIAGNOSIS — K76.9 LIVER LESION: Primary | ICD-10-CM

## 2023-08-18 DIAGNOSIS — C78.7 METASTATIC ADENOCARCINOMA TO LIVER (HCC): ICD-10-CM

## 2023-08-18 DIAGNOSIS — C80.1 PRIMARY CANCER OF UNKNOWN SITE (HCC): ICD-10-CM

## 2023-08-18 DIAGNOSIS — C78.7 METASTASES TO THE LIVER (HCC): ICD-10-CM

## 2023-08-18 NOTE — TELEPHONE ENCOUNTER
Received call from patient's daughter, Monica Stout. She spoke with Dr. Yary Jones about a sooner follow up appointment. Previous appointment scheduled for 8/25 was cancelled. New follow up scheduled for Wednesday, 8/23 at 12:30 pm. Told her to complete the lab test prior to the appointment. She stated understanding and thanked me for the call.

## 2023-08-22 PROBLEM — C25.9 PANCREATIC CANCER METASTASIZED TO LIVER (HCC): Status: ACTIVE | Noted: 2023-08-22

## 2023-08-22 PROBLEM — C78.7 PANCREATIC CANCER METASTASIZED TO LIVER (HCC): Status: ACTIVE | Noted: 2023-08-22

## 2023-08-23 ENCOUNTER — LAB ENCOUNTER (OUTPATIENT)
Dept: LAB | Facility: HOSPITAL | Age: 88
End: 2023-08-23
Attending: INTERNAL MEDICINE
Payer: MEDICARE

## 2023-08-23 ENCOUNTER — OFFICE VISIT (OUTPATIENT)
Dept: HEMATOLOGY/ONCOLOGY | Facility: HOSPITAL | Age: 88
End: 2023-08-23
Attending: INTERNAL MEDICINE
Payer: MEDICARE

## 2023-08-23 VITALS
WEIGHT: 193 LBS | DIASTOLIC BLOOD PRESSURE: 75 MMHG | HEIGHT: 66.5 IN | BODY MASS INDEX: 30.65 KG/M2 | TEMPERATURE: 98 F | SYSTOLIC BLOOD PRESSURE: 164 MMHG | HEART RATE: 73 BPM | RESPIRATION RATE: 18 BRPM | OXYGEN SATURATION: 96 %

## 2023-08-23 DIAGNOSIS — C25.9 PANCREATIC CANCER METASTASIZED TO LIVER (HCC): Primary | ICD-10-CM

## 2023-08-23 DIAGNOSIS — C78.7 PANCREATIC CANCER METASTASIZED TO LIVER (HCC): Primary | ICD-10-CM

## 2023-08-23 DIAGNOSIS — Z51.11 ENCOUNTER FOR CHEMOTHERAPY MANAGEMENT: ICD-10-CM

## 2023-08-23 DIAGNOSIS — C78.7 METASTATIC ADENOCARCINOMA TO LIVER (HCC): ICD-10-CM

## 2023-08-23 LAB — CANCER AG19-9 SERPL-ACNC: 8.3 U/ML (ref ?–37)

## 2023-08-23 PROCEDURE — 36415 COLL VENOUS BLD VENIPUNCTURE: CPT

## 2023-08-23 PROCEDURE — 86301 IMMUNOASSAY TUMOR CA 19-9: CPT

## 2023-08-23 PROCEDURE — 99215 OFFICE O/P EST HI 40 MIN: CPT | Performed by: INTERNAL MEDICINE

## 2023-08-24 ENCOUNTER — TELEPHONE (OUTPATIENT)
Dept: HEMATOLOGY/ONCOLOGY | Facility: HOSPITAL | Age: 88
End: 2023-08-24

## 2023-08-24 NOTE — TELEPHONE ENCOUNTER
Called and spoke with patient's daughter, Marily Hyatt to let her know we have availability for both the follow up appointment with Dr. Carola Fletcher and the palliative care consult appointment for Friday, Sept 8. Told her they will see palliative at 9:00 and Dr. Carola Fletcher at 10:00 am. She stated understanding and thanked me for the call.

## 2023-08-25 ENCOUNTER — APPOINTMENT (OUTPATIENT)
Dept: HEMATOLOGY/ONCOLOGY | Facility: HOSPITAL | Age: 88
End: 2023-08-25
Attending: INTERNAL MEDICINE
Payer: MEDICARE

## 2023-09-07 ENCOUNTER — TELEPHONE (OUTPATIENT)
Dept: HEMATOLOGY/ONCOLOGY | Facility: HOSPITAL | Age: 88
End: 2023-09-07

## 2023-09-07 NOTE — TELEPHONE ENCOUNTER
Daughter Sho Barclay called to cx appts for tomorrow 9/8. Laura Mallory has a cold she will call back to reschedule.  Sho Barclay is a RN here. Claudia Cuellar

## 2023-09-08 ENCOUNTER — APPOINTMENT (OUTPATIENT)
Dept: HEMATOLOGY/ONCOLOGY | Facility: HOSPITAL | Age: 88
End: 2023-09-08
Attending: INTERNAL MEDICINE
Payer: MEDICARE

## 2023-09-22 ENCOUNTER — TELEPHONE (OUTPATIENT)
Dept: HEMATOLOGY/ONCOLOGY | Facility: HOSPITAL | Age: 88
End: 2023-09-22

## 2023-09-22 NOTE — TELEPHONE ENCOUNTER
Patient's daughter in law called to reschedule appointments he canceled due to covid with for a follow up and palliative care. Please callback to schedule. Thank you.

## 2023-09-22 NOTE — TELEPHONE ENCOUNTER
I spoke with Aquilino Berumen and Palliative appts made for 9/29. Monica Stout aware and verbalized understanding.

## 2023-09-28 ENCOUNTER — TELEPHONE (OUTPATIENT)
Dept: HEMATOLOGY/ONCOLOGY | Facility: HOSPITAL | Age: 88
End: 2023-09-28

## 2023-09-28 NOTE — TELEPHONE ENCOUNTER
Eitan Ralphsimon [phone] father said he feels fine dosen't need his appointment on 9/29/23 he is canceling.  Thanks [employer]

## 2023-09-29 ENCOUNTER — APPOINTMENT (OUTPATIENT)
Dept: HEMATOLOGY/ONCOLOGY | Facility: HOSPITAL | Age: 88
End: 2023-09-29
Attending: INTERNAL MEDICINE
Payer: MEDICARE

## 2024-03-04 ENCOUNTER — APPOINTMENT (OUTPATIENT)
Dept: CT IMAGING | Facility: HOSPITAL | Age: 89
End: 2024-03-04
Attending: EMERGENCY MEDICINE
Payer: MEDICARE

## 2024-03-04 ENCOUNTER — APPOINTMENT (OUTPATIENT)
Dept: CT IMAGING | Facility: HOSPITAL | Age: 89
DRG: 682 | End: 2024-03-04
Attending: EMERGENCY MEDICINE
Payer: MEDICARE

## 2024-03-04 ENCOUNTER — HOSPITAL ENCOUNTER (INPATIENT)
Facility: HOSPITAL | Age: 89
LOS: 3 days | Discharge: HOME | DRG: 682 | End: 2024-03-07
Attending: EMERGENCY MEDICINE | Admitting: HOSPITALIST
Payer: MEDICARE

## 2024-03-04 ENCOUNTER — HOSPITAL ENCOUNTER (INPATIENT)
Facility: HOSPITAL | Age: 89
LOS: 3 days | Discharge: HOSPICE | End: 2024-03-07
Attending: EMERGENCY MEDICINE | Admitting: HOSPITALIST
Payer: MEDICARE

## 2024-03-04 ENCOUNTER — HOSPITAL ENCOUNTER (INPATIENT)
Facility: HOSPITAL | Age: 89
LOS: 3 days | Discharge: HOSPICE | End: 2024-03-07
Attending: EMERGENCY MEDICINE
Payer: MEDICARE

## 2024-03-04 DIAGNOSIS — C25.9 PANCREATIC CANCER METASTASIZED TO LIVER (HCC): ICD-10-CM

## 2024-03-04 DIAGNOSIS — C78.7 PANCREATIC CANCER METASTASIZED TO LIVER (HCC): ICD-10-CM

## 2024-03-04 DIAGNOSIS — E83.52 HYPERCALCEMIA: Primary | ICD-10-CM

## 2024-03-04 DIAGNOSIS — C25.9 MALIGNANT NEOPLASM OF PANCREAS, UNSPECIFIED LOCATION OF MALIGNANCY (HCC): ICD-10-CM

## 2024-03-04 LAB
ALBUMIN SERPL-MCNC: 3.9 G/DL (ref 3.2–4.8)
ALP LIVER SERPL-CCNC: 188 U/L
ALT SERPL-CCNC: 28 U/L
ANION GAP SERPL CALC-SCNC: 8 MMOL/L (ref 0–18)
AST SERPL-CCNC: 66 U/L (ref ?–34)
BASOPHILS # BLD AUTO: 0.02 X10(3) UL (ref 0–0.2)
BASOPHILS NFR BLD AUTO: 0.2 %
BILIRUB DIRECT SERPL-MCNC: 0.5 MG/DL (ref ?–0.3)
BILIRUB SERPL-MCNC: 1 MG/DL (ref 0.2–1.1)
BILIRUB UR QL: NEGATIVE
BUN BLD-MCNC: 28 MG/DL (ref 9–23)
BUN/CREAT SERPL: 18.7 (ref 10–20)
CALCIUM BLD-MCNC: 13.4 MG/DL (ref 8.7–10.4)
CHLORIDE SERPL-SCNC: 104 MMOL/L (ref 98–112)
CO2 SERPL-SCNC: 31 MMOL/L (ref 21–32)
COLOR UR: YELLOW
CREAT BLD-MCNC: 1.5 MG/DL
DEPRECATED RDW RBC AUTO: 47.3 FL (ref 35.1–46.3)
EGFRCR SERPLBLD CKD-EPI 2021: 45 ML/MIN/1.73M2 (ref 60–?)
EOSINOPHIL # BLD AUTO: 0.01 X10(3) UL (ref 0–0.7)
EOSINOPHIL NFR BLD AUTO: 0.1 %
ERYTHROCYTE [DISTWIDTH] IN BLOOD BY AUTOMATED COUNT: 14.1 % (ref 11–15)
GLUCOSE BLD-MCNC: 102 MG/DL (ref 70–99)
GLUCOSE UR-MCNC: NORMAL MG/DL
HCT VFR BLD AUTO: 38.2 %
HGB BLD-MCNC: 13.3 G/DL
HGB UR QL STRIP.AUTO: NEGATIVE
HYALINE CASTS #/AREA URNS AUTO: PRESENT /LPF
IMM GRANULOCYTES # BLD AUTO: 0.03 X10(3) UL (ref 0–1)
IMM GRANULOCYTES NFR BLD: 0.3 %
KETONES UR-MCNC: 10 MG/DL
LEUKOCYTE ESTERASE UR QL STRIP.AUTO: NEGATIVE
LIPASE SERPL-CCNC: 42 U/L (ref 13–75)
LYMPHOCYTES # BLD AUTO: 0.93 X10(3) UL (ref 1–4)
LYMPHOCYTES NFR BLD AUTO: 8.6 %
MCH RBC QN AUTO: 31.5 PG (ref 26–34)
MCHC RBC AUTO-ENTMCNC: 34.8 G/DL (ref 31–37)
MCV RBC AUTO: 90.5 FL
MONOCYTES # BLD AUTO: 0.7 X10(3) UL (ref 0.1–1)
MONOCYTES NFR BLD AUTO: 6.4 %
NEUTROPHILS # BLD AUTO: 9.18 X10 (3) UL (ref 1.5–7.7)
NEUTROPHILS # BLD AUTO: 9.18 X10(3) UL (ref 1.5–7.7)
NEUTROPHILS NFR BLD AUTO: 84.4 %
NITRITE UR QL STRIP.AUTO: NEGATIVE
OSMOLALITY SERPL CALC.SUM OF ELEC: 302 MOSM/KG (ref 275–295)
PH UR: 6 [PH] (ref 5–8)
PLATELET # BLD AUTO: 209 10(3)UL (ref 150–450)
POTASSIUM SERPL-SCNC: 3.2 MMOL/L (ref 3.5–5.1)
PROT SERPL-MCNC: 6.8 G/DL (ref 5.7–8.2)
PROT UR-MCNC: 70 MG/DL
PTH-INTACT SERPL-MCNC: 10.7 PG/ML (ref 18.5–88)
RBC # BLD AUTO: 4.22 X10(6)UL
SODIUM SERPL-SCNC: 143 MMOL/L (ref 136–145)
SP GR UR STRIP: 1.02 (ref 1–1.03)
UROBILINOGEN UR STRIP-ACNC: 2
WBC # BLD AUTO: 10.9 X10(3) UL (ref 4–11)

## 2024-03-04 PROCEDURE — 99223 1ST HOSP IP/OBS HIGH 75: CPT | Performed by: HOSPITALIST

## 2024-03-04 PROCEDURE — 74177 CT ABD & PELVIS W/CONTRAST: CPT | Performed by: EMERGENCY MEDICINE

## 2024-03-04 RX ORDER — METOCLOPRAMIDE HYDROCHLORIDE 5 MG/ML
5 INJECTION INTRAMUSCULAR; INTRAVENOUS EVERY 8 HOURS PRN
Status: DISCONTINUED | OUTPATIENT
Start: 2024-03-04 | End: 2024-03-05

## 2024-03-04 RX ORDER — MORPHINE SULFATE 4 MG/ML
4 INJECTION, SOLUTION INTRAMUSCULAR; INTRAVENOUS ONCE
Status: DISCONTINUED | OUTPATIENT
Start: 2024-03-04 | End: 2024-03-07

## 2024-03-04 RX ORDER — AMLODIPINE BESYLATE 5 MG/1
5 TABLET ORAL DAILY
Status: DISCONTINUED | OUTPATIENT
Start: 2024-03-04 | End: 2024-03-07

## 2024-03-04 RX ORDER — POTASSIUM CHLORIDE 20 MEQ/1
40 TABLET, EXTENDED RELEASE ORAL ONCE
Status: COMPLETED | OUTPATIENT
Start: 2024-03-04 | End: 2024-03-04

## 2024-03-04 RX ORDER — ONDANSETRON 2 MG/ML
4 INJECTION INTRAMUSCULAR; INTRAVENOUS EVERY 6 HOURS PRN
Status: DISCONTINUED | OUTPATIENT
Start: 2024-03-04 | End: 2024-03-07

## 2024-03-04 RX ORDER — LOSARTAN POTASSIUM 100 MG/1
100 TABLET ORAL DAILY
Status: DISCONTINUED | OUTPATIENT
Start: 2024-03-05 | End: 2024-03-07

## 2024-03-04 RX ORDER — ONDANSETRON 2 MG/ML
4 INJECTION INTRAMUSCULAR; INTRAVENOUS ONCE
Status: COMPLETED | OUTPATIENT
Start: 2024-03-04 | End: 2024-03-04

## 2024-03-04 RX ORDER — METOCLOPRAMIDE HYDROCHLORIDE 5 MG/ML
10 INJECTION INTRAMUSCULAR; INTRAVENOUS EVERY 8 HOURS PRN
Status: DISCONTINUED | OUTPATIENT
Start: 2024-03-04 | End: 2024-03-04

## 2024-03-04 RX ORDER — SODIUM CHLORIDE 9 MG/ML
INJECTION, SOLUTION INTRAVENOUS CONTINUOUS
Status: DISCONTINUED | OUTPATIENT
Start: 2024-03-04 | End: 2024-03-07

## 2024-03-04 RX ORDER — ALPRAZOLAM 0.5 MG/1
0.5 TABLET ORAL 3 TIMES DAILY PRN
Status: DISCONTINUED | OUTPATIENT
Start: 2024-03-04 | End: 2024-03-07

## 2024-03-04 RX ORDER — HEPARIN SODIUM 5000 [USP'U]/ML
5000 INJECTION, SOLUTION INTRAVENOUS; SUBCUTANEOUS EVERY 12 HOURS SCHEDULED
Status: DISCONTINUED | OUTPATIENT
Start: 2024-03-04 | End: 2024-03-05

## 2024-03-04 RX ORDER — MORPHINE SULFATE 2 MG/ML
2 INJECTION, SOLUTION INTRAMUSCULAR; INTRAVENOUS EVERY 2 HOUR PRN
Status: DISCONTINUED | OUTPATIENT
Start: 2024-03-04 | End: 2024-03-05

## 2024-03-04 RX ORDER — HYDROCHLOROTHIAZIDE 12.5 MG/1
12.5 TABLET ORAL DAILY
COMMUNITY
Start: 2024-01-11

## 2024-03-04 RX ORDER — MORPHINE SULFATE 2 MG/ML
2 INJECTION, SOLUTION INTRAMUSCULAR; INTRAVENOUS ONCE
Status: COMPLETED | OUTPATIENT
Start: 2024-03-04 | End: 2024-03-04

## 2024-03-04 RX ORDER — ATORVASTATIN CALCIUM 10 MG/1
10 TABLET, FILM COATED ORAL DAILY
Status: DISCONTINUED | OUTPATIENT
Start: 2024-03-04 | End: 2024-03-07

## 2024-03-04 RX ORDER — HYDRALAZINE HYDROCHLORIDE 20 MG/ML
10 INJECTION INTRAMUSCULAR; INTRAVENOUS EVERY 4 HOURS PRN
Status: DISCONTINUED | OUTPATIENT
Start: 2024-03-04 | End: 2024-03-07

## 2024-03-04 RX ORDER — ZOLPIDEM TARTRATE 5 MG/1
10 TABLET ORAL NIGHTLY
Status: DISCONTINUED | OUTPATIENT
Start: 2024-03-04 | End: 2024-03-07

## 2024-03-04 RX ORDER — LOSARTAN POTASSIUM 100 MG/1
100 TABLET ORAL DAILY
COMMUNITY
Start: 2024-02-07

## 2024-03-04 RX ORDER — PANTOPRAZOLE SODIUM 40 MG/1
40 TABLET, DELAYED RELEASE ORAL
Status: DISCONTINUED | OUTPATIENT
Start: 2024-03-05 | End: 2024-03-07

## 2024-03-04 RX ORDER — MORPHINE SULFATE 4 MG/ML
4 INJECTION, SOLUTION INTRAMUSCULAR; INTRAVENOUS EVERY 2 HOUR PRN
Status: DISCONTINUED | OUTPATIENT
Start: 2024-03-04 | End: 2024-03-05

## 2024-03-04 RX ORDER — MORPHINE SULFATE 2 MG/ML
1 INJECTION, SOLUTION INTRAMUSCULAR; INTRAVENOUS EVERY 2 HOUR PRN
Status: DISCONTINUED | OUTPATIENT
Start: 2024-03-04 | End: 2024-03-05

## 2024-03-04 RX ORDER — ACETAMINOPHEN 500 MG
500 TABLET ORAL EVERY 4 HOURS PRN
Status: DISCONTINUED | OUTPATIENT
Start: 2024-03-04 | End: 2024-03-07

## 2024-03-04 NOTE — ED PROVIDER NOTES
Patient Seen in: [facility] Emergency Department    History     Chief Complaint   Patient presents with    Nausea/Vomiting/Diarrhea       HPI    History is provided by patient/independent historian: Patient, patient's daughter  88 year old male with history of prostate cancer, liver cancer not undergoing treatment by choice, here with complaints of vomiting for the past week.  Patient has had numerous episodes of nonbloody nonbilious emesis.  Daughter reports concern that he has not eaten or drink anything in the past 2 days and is concerned for dehydration.  No changes in bowel movements.  Patient does complain of increased urinary frequency.  No flank pain.  No fevers or chills.    History reviewed.   Past Medical History:   Diagnosis Date    Anxiety     Aortic stenosis     Hypertension     Lipid screening 04/29/2009    Pancreatic cancer (HCC)     Pancreatic cancer metastasized to liver    Prostate cancer (HCC) 2011    stage II, [id number], [place] 3+4=7 with pretreatment PSA of 6.3 s/p Cyberknife radiosurgery in 9/2011    Renal stones     Screening PSA (prostate specific antigen) 05/09/2009         History reviewed.   Past Surgical History:   Procedure Laterality Date    COLONOSCOPY N/A 1/24/2020    Procedure: COLONOSCOPY;  Surgeon: Deonte Carlin MD;  Location: [facility] ENDOSCOPY    LITHOTRIPSY           Home Medications reviewed :  (Not in a hospital admission)        History reviewed.   Social History     Socioeconomic History    Marital status:    Tobacco Use    Smoking status: Never    Smokeless tobacco: Never   Vaping Use    Vaping Use: Never used   Substance and Sexual Activity    Alcohol use: No     Alcohol/week: 0.0 standard drinks of alcohol    Drug use: No     Comment: No history of illicit substance abuse         ROS  Review of Systems   Respiratory:  Negative for shortness of breath.    Cardiovascular:  Negative for chest pain.   Gastrointestinal:  Positive for abdominal pain, nausea and  vomiting.   All other systems reviewed and are negative.     All other pertinent organ systems are reviewed and are negative.      Physical Exam     ED Triage Vitals [03/04/24 1031]   BP (!) 163/89   Pulse 98   Resp 18   Temp 98 °F (36.7 °C)   Temp src Temporal   SpO2 97 %   O2 Device None (Room air)     Vital signs reviewed.      Physical Exam  Vitals and nursing note reviewed.   Cardiovascular:      Pulses: Normal pulses.   Pulmonary:      Effort: No respiratory distress.   Abdominal:      General: There is no distension.      Tenderness: There is generalized abdominal tenderness.   Neurological:      Mental Status: He is alert.         ED Course       Labs:     Labs Reviewed   BASIC METABOLIC PANEL (8) - Abnormal; Notable for the following components:       Result Value    Glucose 102 (*)     Potassium 3.2 (*)     BUN 28 (*)     Creatinine 1.50 (*)     Calcium, Total 13.4 (*)     Calculated Osmolality 302 (*)     eGFR-Cr 45 (*)     All other components within normal limits   HEPATIC FUNCTION PANEL (7) - Abnormal; Notable for the following components:    AST 66 (*)     Alkaline Phosphatase 188 (*)     Bilirubin, Direct 0.5 (*)     All other components within normal limits   URINALYSIS WITH CULTURE REFLEX - Abnormal; Notable for the following components:    Clarity Urine Turbid (*)     Ketones Urine 10 (*)     Protein Urine 70 (*)     Urobilinogen Urine 2 (*)     Squamous Epi. Cells Few (*)     Hyaline Casts Present (*)     All other components within normal limits   PTH, INTACT - Abnormal; Notable for the following components:    Pth Intact 10.7 (*)     All other components within normal limits   CBC W/ DIFFERENTIAL - Abnormal; Notable for the following components:    HCT 38.2 (*)     RDW-SD 47.3 (*)     Neutrophil Absolute Prelim 9.18 (*)     Neutrophil Absolute 9.18 (*)     Lymphocyte Absolute 0.93 (*)     All other components within normal limits   LIPASE - Normal   CBC WITH DIFFERENTIAL WITH PLATELET     Narrative:     The following orders were created for panel order CBC With Differential With Platelet.                  Procedure                               Abnormality         Status                                     ---------                               -----------         ------                                     CBC W/ DIFFERENTIAL[[id number]]          Abnormal            Final result                                                 Please view results for these tests on the individual orders.   PARATHYROID HORMONE-RELATED PEPTIDE         My EKG Interpretation:   As reviewed and Interpreted by me      Imaging Results Available and Reviewed while in ED:   CT ABDOMEN+PELVIS(CONTRAST ONLY)(CPT=74177)    Result Date: 3/4/2024  CONCLUSION:   Significantly increased bilobar hepatic masses  Increased pancreatic tail lesion  Stable crescent shaped enhancing mass along the posterior wall of the low rectum  New small volume ascites    Dictated by (CST): Jeevan Avalos MD on 3/04/2024 at 1:38 PM     Finalized by (CST): Jeevan Avalos MD on 3/04/2024 at 1:45 PM         My review and independent interpretation of CT images: no free air. Radiology report corroborates this in addition to other details as reported by them.      Decision rules/scores evaluated: none      Diagnostic labs/tests considered but not ordered: none    ED Medications Administered:   Medications   morphINE PF 4 MG/ML injection 4 mg (4 mg Intravenous Not Given 3/4/24 1146)   ondansetron (Zofran) 4 MG/2ML injection 4 mg (4 mg Intravenous Given 3/4/24 1146)   sodium chloride 0.9 % IV bolus 1,000 mL (1,000 mL Intravenous New Bag 3/4/24 1145)   iopamidol 76% (ISOVUE-370) injection for power injector (80 mL Intravenous Given 3/4/24 1314)                MDM       Medical Decision Making      Differential Diagnosis: After obtaining the patient's history, performing the physical exam and reviewing the diagnostics, multiple initial diagnoses were considered  based on the presenting problem including SBO, volvulus, viral syndrome, gastroenteritis    External document review: I personally reviewed available external medical records for any recent pertinent discharge summaries, testing, and procedures - the findings are as follows: 8/23/23 visit with Dr. Corrales for metastatic pancreatic cancer - opted for no systemic treatment    Complicating Factors: The patient already  has a past medical history of Anxiety, Aortic stenosis, Hypertension, Lipid screening (04/29/2009), Pancreatic cancer (HCC), Prostate cancer (HCC) (2011), Renal stones, and Screening PSA (prostate specific antigen) (05/09/2009). to contribute to the complexity of this ED evaluation.    Procedures performed: none    Discussed management with physician/appropriate source: none    Considered admission/deescalation of care for: vomiting    Social determinants of health affecting patient care: none    Prescription medications considered: discussed continuing current medication regimen    The patient requires continuous monitoring for: vomiting    Shared decision making: discussed possible admission      Critical Care Time:  Total critical care time for this patient was 30 minutes exclusive of separately billable procedures, but in obtaining history, performing a physical exam, bedside monitoring of interventions, collecting and interpreting test, and discussion with consultants.      Disposition and Plan     Clinical Impression:  1. Hypercalcemia    2. Malignant neoplasm of pancreas, unspecified location of malignancy (HCC)        Disposition:  Admit    Follow-up:  No follow-up provider specified.    Medications Prescribed:  Current Discharge Medication List          Hospital Problems       Present on Admission  Date Reviewed: 8/23/2023            ICD-10-CM Noted POA    * (Principal) Hypercalcemia E83.52 3/4/2024 Unknown

## 2024-03-04 NOTE — H&P
[facility]    PATIENT'S NAME: NORMA JULIAN   ATTENDING PHYSICIAN: Trey Romero MD   PATIENT ACCOUNT#:   [id number]    LOCATION:  [address]  MEDICAL RECORD #:   [id number]       YOB: 1935  ADMISSION DATE:       03/04/2024    HISTORY AND PHYSICAL EXAMINATION    CHIEF COMPLAINT:  Dehydration, hypercalcemia, nausea, and vomiting.    HISTORY OF PRESENT ILLNESS:  The patient is an 88-year-old  male with known metastatic pancreatic cancer, has declined systemic chemotherapy after evaluation by Oncology.  Today, he presented to the emergency department for evaluation of poor appetite, nausea, and vomiting for the last week.  Chemistry today showed calcium 13.4, GFR 45, which is at baseline, potassium 3.2, BUN and creatinine of 28 and 1.50.  Urinalysis showed no clear evidence of urinary tract infection, positive ketones suggestive of dehydration.  CBC showed white blood cell count of 10.8 with left shift, hemoglobin stable at 13.3.  CT scan of the abdomen showed no acute findings, increased pancreatic tail lesion and enlarging bilobar hepatic metastases, stable crescent-shaped enhancing mass along the posterior wall of the low rectum, new small volume ascites.  PTH was ordered, which is still pending.  The patient was started on IV fluids.  He had a bladder scan in the emergency room which showed almost empty urinary bladder.    PAST MEDICAL HISTORY:  Metastatic pancreatic carcinoma with liver metastases, diagnosed after a liver biopsy, was evaluated by Hematology/Oncology, and the patient declined further systemic chemotherapy.  He has history of prostate cancer status post CyberKnife, hypertension, moderate aortic stenosis, chronic kidney disease stage 3, and generalized osteoarthritis.     PAST SURGICAL HISTORY:  Cystoscopy and lithotripsy.    MEDICATIONS:  Please see medication reconciliation list.    ALLERGIES:  No known drug allergies.     FAMILY HISTORY:  Sister  had breast cancer.    SOCIAL HISTORY:  No tobacco, alcohol, or drug use.  Lives with his family.  At baseline, independent in his basic activities of daily living.    REVIEW OF SYSTEMS:  Per the family, the patient has been having poor appetite, nausea, and vomiting.  He had urine frequency but no burning sensation.  No fever or chills.  No sick contacts.  Other 12-point review of systems negative.      PHYSICAL EXAMINATION:    GENERAL:  Alert and oriented, seems slightly cachectic.  VITAL SIGNS:  Temperature 98.0, pulse 90, respiratory rate 18, blood pressure 182/83, pulse ox 94% on room air.  HEENT:  Atraumatic.  Oropharynx clear.  Dry mucous membranes.  Normal hard and soft palate.  Eyes:  Anicteric sclerae.    NECK:  Supple.  No lymphadenopathy.  Trachea midline.  Full range of motion.  LUNGS:  Clear to auscultation bilaterally.  Normal respiratory effort.   HEART:  Regular rate and rhythm.  S1 and S2 auscultated.   ABDOMEN:  Soft, nondistended.  No tenderness.  Positive bowel sounds.  There is left inguinal hernia easily reducible.  According to CT scan, this hernia is containing fat.   EXTREMITIES:  No peripheral edema, clubbing, or cyanosis.  NEUROLOGIC:  Motor and sensory intact.      ASSESSMENT AND PLAN:    1.   Dehydration.  2.   Hypercalcemia.  3.   Metastatic pancreatic cancer.  4.   Essential hypertension.    The patient will be admitted to general medical floor.  Continue IV fluid hydration.  Will obtain Endocrinology and Hematology/Oncology consults.  Monitor his rhythm.  The patient will probably need Zometa if his calcium level does not come down with IV fluids.  Also follow up on intact PTH level and PTH-related peptide.  Further recommendations to follow.    Dictated By Soumya Milligan MD  d: 03/04/2024 14:41:23  t: 03/04/2024 14:47:36  Job [id number]  FB/

## 2024-03-04 NOTE — ED QUICK NOTES
Orders for admission, patient is aware of plan and ready to go upstairs. Any questions, please call ED RN Mecca at extension [phone].     Patient Covid vaccination status: Unvaccinated     COVID Test Ordered in ED: None    COVID Suspicion at Admission: N/A    Running Infusions:      Mental Status/LOC at time of transport: A&Ox3    Other pertinent information: Belarusian speaking   CIWA score: N/A   NIH score:  N/A

## 2024-03-05 ENCOUNTER — APPOINTMENT (OUTPATIENT)
Dept: ULTRASOUND IMAGING | Facility: HOSPITAL | Age: 89
End: 2024-03-05
Attending: HOSPITALIST
Payer: MEDICARE

## 2024-03-05 ENCOUNTER — APPOINTMENT (OUTPATIENT)
Dept: ULTRASOUND IMAGING | Facility: HOSPITAL | Age: 89
DRG: 682 | End: 2024-03-05
Attending: HOSPITALIST
Payer: MEDICARE

## 2024-03-05 PROBLEM — Z51.5 PALLIATIVE CARE BY SPECIALIST: Status: ACTIVE | Noted: 2024-03-05

## 2024-03-05 LAB
ALBUMIN SERPL-MCNC: 3.4 G/DL (ref 3.2–4.8)
ALBUMIN/GLOB SERPL: 1.4 {RATIO} (ref 1–2)
ALP LIVER SERPL-CCNC: 155 U/L
ALT SERPL-CCNC: 24 U/L
ANION GAP SERPL CALC-SCNC: 6 MMOL/L (ref 0–18)
AST SERPL-CCNC: 59 U/L (ref ?–34)
BASOPHILS # BLD AUTO: 0.02 X10(3) UL (ref 0–0.2)
BASOPHILS # BLD AUTO: 0.03 X10(3) UL (ref 0–0.2)
BASOPHILS NFR BLD AUTO: 0.2 %
BASOPHILS NFR BLD AUTO: 0.3 %
BILIRUB SERPL-MCNC: 0.9 MG/DL (ref 0.2–1.1)
BUN BLD-MCNC: 30 MG/DL (ref 9–23)
BUN/CREAT SERPL: 24 (ref 10–20)
CALCIUM BLD-MCNC: 11.9 MG/DL (ref 8.7–10.4)
CALCIUM BLD-MCNC: 12.4 MG/DL (ref 8.7–10.4)
CHLORIDE SERPL-SCNC: 107 MMOL/L (ref 98–112)
CO2 SERPL-SCNC: 29 MMOL/L (ref 21–32)
CREAT BLD-MCNC: 1.25 MG/DL
DEPRECATED HBV CORE AB SER IA-ACNC: 125.6 NG/ML
DEPRECATED RDW RBC AUTO: 47.9 FL (ref 35.1–46.3)
DEPRECATED RDW RBC AUTO: 48.1 FL (ref 35.1–46.3)
EGFRCR SERPLBLD CKD-EPI 2021: 56 ML/MIN/1.73M2 (ref 60–?)
EOSINOPHIL # BLD AUTO: 0.04 X10(3) UL (ref 0–0.7)
EOSINOPHIL # BLD AUTO: 0.05 X10(3) UL (ref 0–0.7)
EOSINOPHIL NFR BLD AUTO: 0.4 %
EOSINOPHIL NFR BLD AUTO: 0.5 %
ERYTHROCYTE [DISTWIDTH] IN BLOOD BY AUTOMATED COUNT: 14.3 % (ref 11–15)
ERYTHROCYTE [DISTWIDTH] IN BLOOD BY AUTOMATED COUNT: 14.4 % (ref 11–15)
GLOBULIN PLAS-MCNC: 2.5 G/DL (ref 2.8–4.4)
GLUCOSE BLD-MCNC: 77 MG/DL (ref 70–99)
HCT VFR BLD AUTO: 35.2 %
HCT VFR BLD AUTO: 35.9 %
HGB BLD-MCNC: 11.7 G/DL
HGB BLD-MCNC: 12.2 G/DL
IMM GRANULOCYTES # BLD AUTO: 0.02 X10(3) UL (ref 0–1)
IMM GRANULOCYTES # BLD AUTO: 0.03 X10(3) UL (ref 0–1)
IMM GRANULOCYTES NFR BLD: 0.2 %
IMM GRANULOCYTES NFR BLD: 0.3 %
IRON SATN MFR SERPL: 19 %
IRON SERPL-MCNC: 43 UG/DL
LYMPHOCYTES # BLD AUTO: 0.73 X10(3) UL (ref 1–4)
LYMPHOCYTES # BLD AUTO: 1.02 X10(3) UL (ref 1–4)
LYMPHOCYTES NFR BLD AUTO: 10.9 %
LYMPHOCYTES NFR BLD AUTO: 7.1 %
MCH RBC QN AUTO: 30.5 PG (ref 26–34)
MCH RBC QN AUTO: 31.1 PG (ref 26–34)
MCHC RBC AUTO-ENTMCNC: 33.2 G/DL (ref 31–37)
MCHC RBC AUTO-ENTMCNC: 34 G/DL (ref 31–37)
MCV RBC AUTO: 91.6 FL
MCV RBC AUTO: 91.9 FL
MONOCYTES # BLD AUTO: 0.76 X10(3) UL (ref 0.1–1)
MONOCYTES # BLD AUTO: 0.86 X10(3) UL (ref 0.1–1)
MONOCYTES NFR BLD AUTO: 8.1 %
MONOCYTES NFR BLD AUTO: 8.3 %
NEUTROPHILS # BLD AUTO: 7.51 X10 (3) UL (ref 1.5–7.7)
NEUTROPHILS # BLD AUTO: 7.51 X10(3) UL (ref 1.5–7.7)
NEUTROPHILS # BLD AUTO: 8.64 X10 (3) UL (ref 1.5–7.7)
NEUTROPHILS # BLD AUTO: 8.64 X10(3) UL (ref 1.5–7.7)
NEUTROPHILS NFR BLD AUTO: 80.1 %
NEUTROPHILS NFR BLD AUTO: 83.6 %
OSMOLALITY SERPL CALC.SUM OF ELEC: 299 MOSM/KG (ref 275–295)
PLATELET # BLD AUTO: 168 10(3)UL (ref 150–450)
PLATELET # BLD AUTO: 194 10(3)UL (ref 150–450)
POTASSIUM SERPL-SCNC: 3.5 MMOL/L (ref 3.5–5.1)
POTASSIUM SERPL-SCNC: 3.5 MMOL/L (ref 3.5–5.1)
PROT SERPL-MCNC: 5.9 G/DL (ref 5.7–8.2)
RBC # BLD AUTO: 3.83 X10(6)UL
RBC # BLD AUTO: 3.92 X10(6)UL
SODIUM SERPL-SCNC: 142 MMOL/L (ref 136–145)
T4 FREE SERPL-MCNC: 1.2 NG/DL (ref 0.8–1.7)
TIBC SERPL-MCNC: 228 UG/DL (ref 250–425)
TRANSFERRIN SERPL-MCNC: 153 MG/DL (ref 215–365)
TSI SER-ACNC: 1.54 MIU/ML (ref 0.55–4.78)
VIT D+METAB SERPL-MCNC: 32.8 NG/ML (ref 30–100)
WBC # BLD AUTO: 10.3 X10(3) UL (ref 4–11)
WBC # BLD AUTO: 9.4 X10(3) UL (ref 4–11)

## 2024-03-05 PROCEDURE — 99233 SBSQ HOSP IP/OBS HIGH 50: CPT | Performed by: HOSPITALIST

## 2024-03-05 PROCEDURE — 99223 1ST HOSP IP/OBS HIGH 75: CPT | Performed by: INTERNAL MEDICINE

## 2024-03-05 PROCEDURE — 76775 US EXAM ABDO BACK WALL LIM: CPT | Performed by: HOSPITALIST

## 2024-03-05 PROCEDURE — 99222 1ST HOSP IP/OBS MODERATE 55: CPT | Performed by: INTERNAL MEDICINE

## 2024-03-05 RX ORDER — MORPHINE SULFATE 2 MG/ML
2 INJECTION, SOLUTION INTRAMUSCULAR; INTRAVENOUS EVERY 2 HOUR PRN
Status: DISCONTINUED | OUTPATIENT
Start: 2024-03-05 | End: 2024-03-07

## 2024-03-05 RX ORDER — METOCLOPRAMIDE HYDROCHLORIDE 5 MG/ML
5 INJECTION INTRAMUSCULAR; INTRAVENOUS
Status: DISCONTINUED | OUTPATIENT
Start: 2024-03-05 | End: 2024-03-07

## 2024-03-05 RX ORDER — MORPHINE SULFATE 2 MG/ML
1 INJECTION, SOLUTION INTRAMUSCULAR; INTRAVENOUS EVERY 2 HOUR PRN
Status: DISCONTINUED | OUTPATIENT
Start: 2024-03-05 | End: 2024-03-07

## 2024-03-05 RX ORDER — MORPHINE SULFATE 4 MG/ML
4 INJECTION, SOLUTION INTRAMUSCULAR; INTRAVENOUS EVERY 2 HOUR PRN
Status: DISCONTINUED | OUTPATIENT
Start: 2024-03-05 | End: 2024-03-07

## 2024-03-05 RX ORDER — HYDROCODONE BITARTRATE AND ACETAMINOPHEN 5; 325 MG/1; MG/1
1 TABLET ORAL
Status: DISCONTINUED | OUTPATIENT
Start: 2024-03-05 | End: 2024-03-06

## 2024-03-05 NOTE — SLP NOTE
ADULT SWALLOWING EVALUATION    ASSESSMENT    ASSESSMENT/OVERALL IMPRESSION:  PPE REQUIRED. THIS THERAPIST WORE GLOVES AND MASK FOR DURATION OF EVALUATION. HANDS WASHED UPON ENTRANCE/EXIT.    Per MD H&P, \"The patient is an 88-year-old  male with known metastatic pancreatic cancer, has declined systemic chemotherapy after evaluation by Oncology. Today, he presented to the emergency department for evaluation of poor appetite, nausea, and vomiting for the last week.\"    SLP BSSE orders received and acknowledged. A swallow evaluation warranted per RN dysphagia screen. Pt afebrile with clear vocal quality, on room air, with oxygen saturation at 94%. Pt with hx of dysphagia at [facility], BSE 12/24/20 with recommendations for minced & moist/mildly thick liquids via tsp.   Pt positioned 90 degrees in bed, alert/cooperative/family present. Pt with no complaints of pain. Oral motor skills within functional limits. Pt presented with trials of soft solids, puree, mildly thick liquids via tsp and thin liquids via straw/cup. Pt with adequate oral acceptance and bilabial seal across all trials. Pt with intact bite, prolonged mastication of soft solids, and delayed A-P transit. Pt's swallow response appears delayed with reduced hyolaryngeal elevation/excursion. No clinical signs of aspiration (e.g., immediate/delayed throat clear, immediate/delayed cough, wet vocal quality, increased O2 effort) observed across all trials. No CXR on this admission. Oxygen status remained stable t/o the entire evaluation.     At this time, pt presents with mild oral dysphagia and probable pharyngeal dysfunction. Recommend a soft bite sized diet and thin liquids with strict adherence to safe swallowing compensatory strategies. Results and recommendations reviewed with RN, pt, and family. Pt/pt's family v/u to all results/recommendations. Recommendations remain written on whiteboard. SLP collaborated with RN for MD diet orders.     PLAN: SLP to f/u x2  meal assessments, monitor imaging, and VFSS if clinically indicated         RECOMMENDATIONS   Diet Recommendations - Solids: Mechanical soft chopped/ Soft & Bite Sized  Diet Recommendations - Liquids: Thin Liquids                        Compensatory Strategies Recommended: No straws;Slow rate;Small bites and sips  Aspiration Precautions: Upright position;Slow rate;Small bites and sips;No straw  Medication Administration Recommendations: Crushed in puree  Treatment Plan/Recommendations: Aspiration precautions    HISTORY   MEDICAL HISTORY  Reason for Referral: RN dysphagia screen    Problem List  Principal Problem:    Hypercalcemia  Active Problems:    Malignant neoplasm of pancreas, unspecified location of malignancy (HCC)      Past Medical History  Past Medical History:   Diagnosis Date    Anxiety     Aortic stenosis     Hypertension     Lipid screening 04/29/2009    Pancreatic cancer (HCC)     Pancreatic cancer metastasized to liver    Prostate cancer (HCC) 2011    stage II, [id number], [place] 3+4=7 with pretreatment PSA of 6.3 s/p Cyberknife radiosurgery in 9/2011    Renal stones     Screening PSA (prostate specific antigen) 05/09/2009       Prior Living Situation: Home with support  Diet Prior to Admission: Regular;Thin liquids  Precautions: Aspiration    Patient/Family Goals: did not state    SWALLOWING HISTORY  Current Diet Consistency: NPO  Dysphagia History: see above  Imaging Results: none    SUBJECTIVE       OBJECTIVE   ORAL MOTOR EXAMINATION  Dentition: Functional  Symmetry: Within Functional Limits  Strength: Within Functional Limits     Range of Motion: Within Functional Limits  Rate of Motion: Within Functional Limits    Voice Quality: Clear  Respiratory Status: Unlabored  Consistencies Trialed: Thin liquids;Nectar thick liquids;Puree;Soft solid  Method of Presentation: Staff/Clinician assistance;Spoon;Cup;Straw  Patient Positioning: Upright;Midline    Oral Phase of Swallow: Impaired  Bolus Retrieval:  Intact  Bilabial Seal: Intact  Bolus Formation: Impaired  Bolus Propulsion: Impaired  Mastication: Impaired  Retention: Intact    Pharyngeal Phase of Swallow: Impaired  Laryngeal Elevation Timing: Appears impaired  Laryngeal Elevation Strength: Appears impaired  Laryngeal Elevation Coordination: Appears intact  (Please note: Silent aspiration cannot be evaluated clinically. Videofluoroscopic Swallow Study is required to rule-out silent aspiration.)    Esophageal Phase of Swallow: No complaints consistent with possible esophageal involvement  Comments: NA              GOALS  Goal #1 The patient will tolerate soft bite sized consistency and thin liquids without overt signs or symptoms of aspiration with 100 % accuracy over 1-2 session(s).  In Progress   Goal #2 The patient/family/caregiver will demonstrate understanding and implementation of aspiration precautions and swallow strategies independently over 1-2 session(s).    In Progress   Goal #3 The patient will tolerate trial upgrade of soft easy to chew/regular consistency and thin liquids without overt signs or symptoms of aspiration with 100 % accuracy over 1-2 session(s).  In Progress   Goal #4 The patient will utilize compensatory strategies as outlined by  BSSE (clinical evaluation) including Slow rate, Small bites, Small sips, No straws, Upright 90 degrees with PRN assistance 100 % of the time across 2 sessions.    In Progress     FOLLOW UP  Treatment Plan/Recommendations: Aspiration precautions  Number of Visits to Meet Established Goals: 2  Follow Up Needed (Documentation Required): Yes  SLP Follow-up Date: 03/06/24    Thank you for your referral.   If you have any questions, please contact ROXIE Chatman M.S. CCC-SLP  Speech Language Pathologist  Phone Number [phone]

## 2024-03-05 NOTE — PLAN OF CARE
Problem: Patient Centered Care  Goal: Patient preferences are identified and integrated in the patient's plan of care  Description: Interventions:  - What would you like us to know as we care for you? From home with family and they are his support system.  Patient has Stage 4 Pancreatic CA that had metastasize on his liver.   - Provide timely, complete, and accurate information to patient/family  - Incorporate patient and family knowledge, values, beliefs, and cultural backgrounds into the planning and delivery of care  - Encourage patient/family to participate in care and decision-making at the level they choose  - Honor patient and family perspectives and choices  Outcome: Progressing     Problem: Patient/Family Goals  Goal: Patient/Family Long Term Goal  Description: Patient's Long Term Goal: Will continue to regain strength and pain will be controlled with oral medication.    Interventions:  - Check Calcium level per PCP order.  - Hospice/comfort care.  - Administer pain medication as ordered.  - Monitor for blood stools and refer as needed.  - Manage weight and nutrition.   - See additional Care Plan goals for specific interventions  Outcome: Progressing  Goal: Patient/Family Short Term Goal  Description: Patient's Short Term Goal: Home with hospice care    Interventions:   - Check Calcium level per PCP order.  - Hospice/comfort care.  - Administer pain medication as ordered.  - Monitor for blood stools and refer as needed.  - Manage weight and nutrition.   - Heparin subcutaneous to prevent blood clots.  - PT/OT as ordered.  - See additional Care Plan goals for specific interventions  Outcome: Progressing     Problem: PAIN - ADULT  Goal: Verbalizes/displays adequate comfort level or patient's stated pain goal  Description: INTERVENTIONS:  - Encourage pt to monitor pain and request assistance  - Assess pain using appropriate pain scale  - Administer analgesics based on type and severity of pain and evaluate  response  - Implement non-pharmacological measures as appropriate and evaluate response  - Consider cultural and social influences on pain and pain management  - Manage/alleviate anxiety  - Utilize distraction and/or relaxation techniques  - Monitor for opioid side effects  - Notify MD/LIP if interventions unsuccessful or patient reports new pain  - Anticipate increased pain with activity and pre-medicate as appropriate  Outcome: Progressing     Problem: SAFETY ADULT - FALL  Goal: Free from fall injury  Description: INTERVENTIONS:  - Assess pt frequently for physical needs  - Identify cognitive and physical deficits and behaviors that affect risk of falls.  - Apex fall precautions as indicated by assessment.  - Educate pt/family on patient safety including physical limitations  - Instruct pt to call for assistance with activity based on assessment  - Modify environment to reduce risk of injury  - Provide assistive devices as appropriate  - Consider OT/PT consult to assist with strengthening/mobility  - Encourage toileting schedule  Outcome: Progressing     Problem: DISCHARGE PLANNING  Goal: Discharge to home or other facility with appropriate resources  Description: INTERVENTIONS:  - Identify barriers to discharge w/pt and caregiver  - Include patient/family/discharge partner in discharge planning  - Arrange for needed discharge resources and transportation as appropriate  - Identify discharge learning needs (meds, wound care, etc)  - Arrange for interpreters to assist at discharge as needed  - Consider post-discharge preferences of patient/family/discharge partner  - Complete POLST form as appropriate  - Assess patient's ability to be responsible for managing their own health  - Refer to Case Management Department for coordinating discharge planning if the patient needs post-hospital services based on physician/LIP order or complex needs related to functional status, cognitive ability or social support  system  Outcome: Progressing     Problem: Altered Communication/Language Barrier  Goal: Patient/Family is able to understand and participate in their care  Description: Interventions:  - Assess communication ability and preferred communication style  - Implement communication aides and strategies  - Use visual cues when possible  - Listen attentively, be patient, do not interrupt  - Minimize distractions  - Allow time for understanding and response  - Establish method for patient to ask for assistance (call light)  - Provide an  as needed  - Communicate barriers and strategies to overcome with those who interact with patient  Outcome: Progressing     Problem: GASTROINTESTINAL - ADULT  Goal: Minimal or absence of nausea and vomiting  Description: INTERVENTIONS:  - Maintain adequate hydration with IV or PO as ordered and tolerated  - Nasogastric tube to low intermittent suction as ordered  - Evaluate effectiveness of ordered antiemetic medications  - Provide nonpharmacologic comfort measures as appropriate  - Advance diet as tolerated, if ordered  - Obtain nutritional consult as needed  - Evaluate fluid balance  Outcome: Progressing  Goal: Maintains or returns to baseline bowel function  Description: INTERVENTIONS:  - Assess bowel function  - Maintain adequate hydration with IV or PO as ordered and tolerated  - Evaluate effectiveness of GI medications  - Encourage mobilization and activity  - Obtain nutritional consult as needed  - Establish a toileting routine/schedule  - Consider collaborating with pharmacy to review patient's medication profile  Outcome: Progressing  Goal: Maintains adequate nutritional intake (undernourished)  Description: INTERVENTIONS:  - Monitor percentage of each meal consumed  - Identify factors contributing to decreased intake, treat as appropriate  - Assist with meals as needed  - Monitor I&O, WT and lab values  - Obtain nutritional consult as needed  - Optimize oral hygiene and  moisture  - Encourage food from home; allow for food preferences  - Enhance eating environment  Outcome: Progressing     Problem: HEMATOLOGIC - ADULT  Goal: Maintains hematologic stability  Description: INTERVENTIONS  - Assess for signs and symptoms of bleeding or hemorrhage  - Monitor labs and vital signs for trends  - Administer supportive blood products/factors, fluids and medications as ordered and appropriate  - Administer supportive blood products/factors as ordered and appropriate  Outcome: Not Progressing  Goal: Free from bleeding injury  Description: (Example usage: patient with low platelets)  INTERVENTIONS:  - Avoid intramuscular injections, enemas and rectal medication administration  - Ensure safe mobilization of patient  - Hold pressure on venipuncture sites to achieve adequate hemostasis  - Assess for signs and symptoms of internal bleeding  - Monitor lab trends  - Patient is to report abnormal signs of bleeding to staff  - Avoid use of toothpicks and dental floss  - Use electric shaver for shaving  - Use soft bristle tooth brush  - Limit straining and forceful nose blowing  Outcome: Not Progressing    Patient is alert and oriented x4, primarily Thai speaking but family in the room can communicate for him.  Patient is currently in room air, denies shortness of breathing nor chest pain.  Patient takes oral medication for pain.  Patient has a purewick for urinary incontinence.  He had 1 bloody liquid stool, Dr. Flynn aware.  Patient will probably go home with hospice care when stable.

## 2024-03-05 NOTE — PROGRESS NOTES
[facility]  part of [facility]    Progress Note    Teofilo Espinoza Patient Status:  Inpatient    1935 MRN [id number]   Location [facility] 4W/SW/SE Attending Jovan Flynn,*   Hosp Day # 1 PCP JOCELINE MEJÍA, MD JAYLA     Chief Complaint: weak    Subjective:     Constitutional:  Positive for fatigue. Negative for chills and diaphoresis.   HENT:  Negative for congestion.    Respiratory:  Negative for cough.    Cardiovascular:  Negative for leg swelling.   Gastrointestinal:  Positive for nausea and abdominal pain. Negative for diarrhea.   Genitourinary:  Negative for dysuria and flank pain.   Musculoskeletal:  Positive for back pain.   Neurological:  Positive for weakness.   Psychiatric/Behavioral:  Positive for sleep disturbance and decreased concentration. Negative for hallucinations, behavioral problems, confusion and agitation.        Objective:   Blood pressure 153/90, pulse 90, temperature 97.8 °F (36.6 °C), temperature source Oral, resp. rate 18, height 6' (1.829 m), weight 164 lb 11.2 oz (74.7 kg), SpO2 93%.  Physical Exam  Vitals and nursing note reviewed.   Constitutional:       Appearance: He is ill-appearing.   HENT:      Head:      Comments: Temp wasting  Cardiovascular:      Rate and Rhythm: Normal rate and regular rhythm.   Pulmonary:      Effort: Pulmonary effort is normal.      Breath sounds: Rhonchi and rales present.   Abdominal:      General: Bowel sounds are normal. There is no distension.      Palpations: Abdomen is soft.      Tenderness: There is abdominal tenderness.   Skin:     General: Skin is warm and dry.      Capillary Refill: Capillary refill takes less than 2 seconds.   Neurological:      General: No focal deficit present.      Mental Status: He is alert.   Psychiatric:         Behavior: Behavior normal.         Results:   Lab Results   Component Value Date    WBC 9.4 2024    HGB 11.7 (L) 2024    HCT 35.2 (L) 2024     .0 03/05/2024    CREATSERUM 1.25 03/05/2024    BUN 30 (H) 03/05/2024     03/05/2024    K 3.5 03/05/2024    K 3.5 03/05/2024     03/05/2024    CO2 29.0 03/05/2024    GLU 77 03/05/2024    CA 12.4 (H) 03/05/2024    ALB 3.4 03/05/2024    ALKPHO 155 (H) 03/05/2024    BILT 0.9 03/05/2024    TP 5.9 03/05/2024    AST 59 (H) 03/05/2024    ALT 24 03/05/2024    PTT 33.6 08/15/2023    INR 1.12 08/15/2023    LIP 42 03/04/2024    PSA 0.02 08/15/2023    DDIMER 1.60 (H) 12/31/2020    CRP 6.71 (H) 12/26/2020    MG 2.2 12/30/2020    PHOS 3.4 12/28/2020    TROP 0.116 (HH) 12/23/2020     (H) 12/23/2020       CT ABDOMEN+PELVIS(CONTRAST ONLY)(CPT=74177)    Result Date: 3/4/2024  CONCLUSION:   Significantly increased bilobar hepatic masses  Increased pancreatic tail lesion  Stable crescent shaped enhancing mass along the posterior wall of the low rectum  New small volume ascites    Dictated by (CST): Jeevan Avalos MD on 3/04/2024 at 1:38 PM     Finalized by (CST): Jeevan Avalos MD on 3/04/2024 at 1:45 PM               Assessment & Plan:       Acute renal failure from  Dehydration. better  2.       Hypercalcemia creat cl improved. Will give zometa; check vit d level;pthrp; tsh no obvious bone mets; recheck dixie today   3.       Metastatic pancreatic cancer. Dr martinez to see  4.       Essential hypertension.  5.       Eol dw family to consider dnar/pall care/hospice will discuss among themselves; dr thorpe came but I did not call him, perhaps dr martinez did  6.       Rectal mass with brbpr: contacted dr burgos    Complex mdm; coordinating care with nurse/dr walker and counseling pt and with his permission his family in room about high calc/grim prognosis      LINTANESHA KENNYS SMULKSTYS, MD                     To get better and follow your care plan as instructed.

## 2024-03-05 NOTE — CM/SW NOTE
SCOTT received stating \" Please place referral for [facility]. The indication is terminal pancreatic cancer. The contact/POA is patient's son Scott Ferguson. He is reachable at [phone]. He is in [place] but coming in town in 2 days\"     Referral sent to [facility] - pending confirmation they can accept    POLST form will be addressed by palliative care as indicated in the notes    PLAN: Pending discussion with [facility] if accepted    Darby French, FRANNIEW, MSW ext. [phone]

## 2024-03-05 NOTE — CONSULTS
Palliative Care Initial Inpatient Consult    Shiraz Marty Patient Status:  Inpatient    1935 MRN [id number]   Location [facility] 4W/SW/SE Attending Jovan Flynn,*   Hosp Day # 1 PCP JOCELINE MEJÍA, MD JAYLA     Date of Consult: 3/5/2024  Patient seen at: [facility]    Reason for Consultation: Consult requested for goals of care and care coordination.    Subjective     History of Present Illness: The patient is an 88-year-old  male with known metastatic pancreatic cancer, has declined systemic chemotherapy after evaluation by Oncology. Ytd, he presented to the emergency department for evaluation of poor appetite, nausea, and vomiting for the last week. Chemistry today showed calcium 13.4, GFR 45, which is at baseline, potassium 3.2, BUN and creatinine of 28 and 1.50. Urinalysis showed no clear evidence of urinary tract infection, positive ketones suggestive of dehydration. CBC showed white blood cell count of 10.8 with left shift, hemoglobin stable at 13.3. CT scan of the abdomen showed no acute findings, increased pancreatic tail lesion and enlarging bilobar hepatic metastases, stable crescent-shaped enhancing mass along the posterior wall of the low rectum, new small volume ascites. PTH was ordered, which is still pending. The patient was started on IV fluids. Our palliative care service was asked to evaluate the patient for a goals of care discussion and symptom management.      Review of Systems: Palliative Care symptom needs assessed:     Denies dypsnea.   Denies cough or lightheadedness/dizziness.   See below for current pain issues.   Diminished appetite  + n/v and moved bowels last ytd.   Denies constipation/diarrhea issues.   Denies depression or anxiety.      Palliative Care Social History:   Marital Status:    Children: Yes  Living Situation Prior to Admit: Home  Occupational History: Retired  Personal:     Spiritual  Teofilo Espinoza   JUNIOR Hernandez requested: No    Medical History: obtained from [facility]  Past Medical History:   Diagnosis Date    Anxiety     Aortic stenosis     Hypertension     Lipid screening 04/29/2009    Pancreatic cancer (HCC)     Pancreatic cancer metastasized to liver    Prostate cancer (HCC) 2011    stage II, [id number], [place] 3+4=7 with pretreatment PSA of 6.3 s/p Cyberknife radiosurgery in 9/2011    Renal stones     Screening PSA (prostate specific antigen) 05/09/2009     Past Surgical History:   Procedure Laterality Date    COLONOSCOPY N/A 1/24/2020    Procedure: COLONOSCOPY;  Surgeon: Deonte Carlin MD;  Location: [facility] ENDOSCOPY    LITHOTRIPSY         Family History: obtained from [facility]  Family History   Problem Relation Age of Onset    Breast Cancer Sister 45    Infectious Disease Brother     Bleeding Disorders Neg     DVT/VTE Neg        Allergies:  No Known Allergies    Medications:     Current Facility-Administered Medications:     zoledronic acid (Zometa) 4 mg in sodium chloride 0.9% 105 mL IVPB, 4 mg, Intravenous, Once    metoclopramide (Reglan) 5 mg/mL injection 5 mg, 5 mg, Intravenous, TID AC and HS    morphINE PF 2 MG/ML injection 1 mg, 1 mg, Intravenous, Q2H PRN **OR** morphINE PF 2 MG/ML injection 2 mg, 2 mg, Intravenous, Q2H PRN **OR** morphINE PF 4 MG/ML injection 4 mg, 4 mg, Intravenous, Q2H PRN    HYDROcodone-acetaminophen (Norco) 5-325 MG per tab 1 tablet, 1 tablet, Oral, Q6H WA    morphINE PF 4 MG/ML injection 4 mg, 4 mg, Intravenous, Once    sodium chloride 0.9% infusion, , Intravenous, Continuous    heparin (Porcine) 5000 UNIT/ML injection 5,000 Units, 5,000 Units, Subcutaneous, 2 times per day    acetaminophen (Tylenol Extra Strength) tab 500 mg, 500 mg, Oral, Q4H PRN    ondansetron (Zofran) 4 MG/2ML injection 4 mg, 4 mg, Intravenous, Q6H PRN    hydrALAzine (Apresoline) 20 mg/mL injection 10 mg, 10 mg, Intravenous, Q4H PRN    ALPRAZolam (Xanax) tab 0.5 mg, 0.5 mg, Oral, TID PRN    zolpidem (Ambien)  tab 10 mg, 10 mg, Oral, Nightly    amLODIPine (Norvasc) tab 5 mg, 5 mg, Oral, Daily    atorvastatin (Lipitor) tab 10 mg, 10 mg, Oral, Daily    losartan (Cozaar) tab 100 mg, 100 mg, Oral, Daily    pantoprazole (Protonix) DR tab 40 mg, 40 mg, Oral, QAM AC  No current outpatient medications on file.         Palliative Performance Scale: 40 %  % Ambulation Activity Level Self-Care Intake Consciousness   100 Full  Normal  No Disease Full Normal Full   90 Full  Normal  Some Disease Full Normal Full   80 Full  Normal w/effort  Some Disease Full Normal or reduced Full   70 Reduced  Can't Perform Job  Some Disease Full Normal or reduced Full   60 Reduced  Can't Perform Hobby   Significant Disease Occ Assist Normal or reduced Full or confused   50 Mainly sit/lie Can't do any work  Extensive Disease Partial Assist Normal or reduced Full or confused   40 Mainly in bed Can't do any work  Extensive Disease Mainly Assist Normal or reduced Full or confused   30 Bed Bound Can't do any work  Extensive Disease Max Assist  Total Care Reduced  Drowsy/confused   20 Bed Bound Can't do any work  Extensive Disease Max Assist  Total Care Minimal  Drowsy/confused   10 Bed Bound Can't do any work  Extensive Disease Max Assist  Total Care Mouth Care  Drowsy/confused   0 Death        Objective      Vital Signs:  Blood pressure 132/78, pulse 94, temperature 97.7 °F (36.5 °C), temperature source Oral, resp. rate 18, height 6' (1.829 m), weight 164 lb 11.2 oz (74.7 kg), SpO2 93%.  Body mass index is 22.34 kg/m².  Non-verbal signs of pain present: Yes    Physical Exam:  General: Alert, awake and in no  apparent respiratory distress. Cachexia.  HEENT: AT/NC. No focal deficits.   Cardiac: RRR  Lungs: Normal effort on RA  Abdomen: Soft, distended, normal bowel sounds X 4 quadrants   Extremities: FROM of all limbs, no  Edema present  Skin: Warm and dry.    Hematology:  Lab Results   Component Value Date    WBC 9.4 03/05/2024    HGB 11.7 (L) 03/05/2024     HCT 35.2 (L) 03/05/2024    .0 03/05/2024       Coags:  Lab Results   Component Value Date    INR 1.12 08/15/2023    PTT 33.6 08/15/2023       Chemistry:  Lab Results   Component Value Date    CREATSERUM 1.25 03/05/2024    BUN 30 (H) 03/05/2024     03/05/2024    K 3.5 03/05/2024    K 3.5 03/05/2024     03/05/2024    CO2 29.0 03/05/2024    GLU 77 03/05/2024    CA 12.4 (H) 03/05/2024    ALB 3.4 03/05/2024    ALKPHO 155 (H) 03/05/2024    BILT 0.9 03/05/2024    TP 5.9 03/05/2024    AST 59 (H) 03/05/2024    ALT 24 03/05/2024    PSA 0.02 08/15/2023    DDIMER 1.60 (H) 12/31/2020    MG 2.2 12/30/2020    PHOS 3.4 12/28/2020    TROP 0.116 (HH) 12/23/2020       Imaging:  CT ABDOMEN+PELVIS(CONTRAST ONLY)(CPT=74177)    Result Date: 3/4/2024  CONCLUSION:   Significantly increased bilobar hepatic masses  Increased pancreatic tail lesion  Stable crescent shaped enhancing mass along the posterior wall of the low rectum  New small volume ascites    Dictated by (CST): Jeevan Avalos MD on 3/04/2024 at 1:38 PM     Finalized by (CST): Jeevan Avalos MD on 3/04/2024 at 1:45 PM           Assessment and Recommendations        Hypercalcemia    Malignant neoplasm of pancreas, unspecified location of malignancy (HCC)      Symptom Management      Pain:  -epigastric abdominal pain 2/2 cancer burden  -start Norco 5mg q6hr atc with hold parameters  -prn morphine IV     Nausea/Cachexia:  -schedule reglan 5mg Q6hr    Prevention of Constipation:    - Recommend Senna-S 8.6mg (2) tabs BID Scheduled   - Recommend Miralax 17g daily Scheduled   - Recommend Dulcolax suppository daily PRN    2.     Serious Illness Conversation:   Code Status: Full  POA: NA- surrogate is Scott, youngest son  I met Teofilo and his daughter Taisha just now. They are understanding of his clinical course. Teofilo is requesting to be home as soon as possible. I explained end of life care delivered at home/hospice care and he is agreeable. I also spoke with his son  Caty. Caty is who he would like to appoint as POA. Caty and his wife Fern are going to fly into [place] (from [place]) on Friday. They are amenable to hearing from hospice, so I have asked SW to send a referral to [facility].   I also advised that there is no mandate to enroll now, and they can pursue this at home if he is medically ready.   Teofilo has said in the past that he 'would not want to be on life support.' Based on this I advised DNR to Caty. I will follow up with Teofilo, as caty needs to discuss with his siblings.      PLAN:   -pain control   -hospice informational with surrogate   -will address POLST/Code as well as HC-POA  -can also arrange for enrollment/intake meeting at home on Friday if he can/wishes to discharge sooner      Palliative Care Follow Up: Palliative care team will Continue to follow while inpatient    Thank you for allowing Palliative Care services to participate in the care of Teofilo Espinoza.    A total of 55 minutes were spent on this visit, which included all of the following: direct face to face contact, history taking, physical examination, and >50% was spent counseling and coordinating care.    Deonte Luna MD  3/5/2024  4:31 PM  Palliative Care Services  [facility] [phone]    Note to patient:  The 21st Century Cures Act makes medical notes like these available to patients in the interest of transparency. However, be advised this is a medical document. It is intended as peer to peer communication. It is written in medical language and may contain abbreviations or verbiage that are unfamiliar. It may appear blunt or direct. Medical documents are intended to carry relevant information, facts as evident, and the clinical opinion of the practitioner.

## 2024-03-05 NOTE — PLAN OF CARE
Patient arrived onto unit from ED. A&Ox3-4. Forgetful at times. RA. Sammarinese speaking. Remote tele in place. SCD's on and Heparin subcutaneous for DVT prophylaxis. Primofit in place for incontinence. Continuous IVF in place as ordered, per MD. PRN hydralazine given for BP. No complaints of nausea or vomiting. Electrolytes replaced per protocol. On bed rest. Bed locked and to the lowest position. Frequent rounding in place. Call light within reach. No new complaints at this time.    Problem: Patient Centered Care  Goal: Patient preferences are identified and integrated in the patient's plan of care  Description: Interventions:  - What would you like us to know as we care for you? I live at home with family  - Provide timely, complete, and accurate information to patient/family  - Incorporate patient and family knowledge, values, beliefs, and cultural backgrounds into the planning and delivery of care  - Encourage patient/family to participate in care and decision-making at the level they choose  - Honor patient and family perspectives and choices  Outcome: Progressing     Problem: Patient/Family Goals  Goal: Patient/Family Long Term Goal  Description: Patient's Long Term Goal: Go home    Interventions:  - Monitor labs, replace electrolytes, consults  - See additional Care Plan goals for specific interventions  Outcome: Progressing  Goal: Patient/Family Short Term Goal  Description: Patient's Short Term Goal: Nausea relief    Interventions:   - PRN antimetics  - See additional Care Plan goals for specific interventions  Outcome: Progressing

## 2024-03-06 PROBLEM — K62.5 RECTAL BLEEDING: Status: ACTIVE | Noted: 2024-03-06

## 2024-03-06 LAB
ALBUMIN SERPL-MCNC: 3.3 G/DL (ref 3.2–4.8)
ALBUMIN/GLOB SERPL: 1.4 {RATIO} (ref 1–2)
ALP LIVER SERPL-CCNC: 142 U/L
ALT SERPL-CCNC: 22 U/L
ANION GAP SERPL CALC-SCNC: 3 MMOL/L (ref 0–18)
AST SERPL-CCNC: 58 U/L (ref ?–34)
BASOPHILS # BLD AUTO: 0.03 X10(3) UL (ref 0–0.2)
BASOPHILS NFR BLD AUTO: 0.3 %
BILIRUB SERPL-MCNC: 0.8 MG/DL (ref 0.2–1.1)
BUN BLD-MCNC: 31 MG/DL (ref 9–23)
BUN/CREAT SERPL: 27.2 (ref 10–20)
CALCIUM BLD-MCNC: 11.6 MG/DL (ref 8.7–10.4)
CHLORIDE SERPL-SCNC: 112 MMOL/L (ref 98–112)
CO2 SERPL-SCNC: 29 MMOL/L (ref 21–32)
CREAT BLD-MCNC: 1.14 MG/DL
DEPRECATED RDW RBC AUTO: 48.2 FL (ref 35.1–46.3)
EGFRCR SERPLBLD CKD-EPI 2021: 63 ML/MIN/1.73M2 (ref 60–?)
EOSINOPHIL # BLD AUTO: 0.06 X10(3) UL (ref 0–0.7)
EOSINOPHIL NFR BLD AUTO: 0.6 %
ERYTHROCYTE [DISTWIDTH] IN BLOOD BY AUTOMATED COUNT: 14.4 % (ref 11–15)
GLOBULIN PLAS-MCNC: 2.4 G/DL (ref 2.8–4.4)
GLUCOSE BLD-MCNC: 91 MG/DL (ref 70–99)
HCT VFR BLD AUTO: 35.3 %
HGB BLD-MCNC: 12.1 G/DL
IMM GRANULOCYTES # BLD AUTO: 0.04 X10(3) UL (ref 0–1)
IMM GRANULOCYTES NFR BLD: 0.4 %
LYMPHOCYTES # BLD AUTO: 0.7 X10(3) UL (ref 1–4)
LYMPHOCYTES NFR BLD AUTO: 7.5 %
MAGNESIUM SERPL-MCNC: 1.8 MG/DL (ref 1.6–2.6)
MCH RBC QN AUTO: 31.3 PG (ref 26–34)
MCHC RBC AUTO-ENTMCNC: 34.3 G/DL (ref 31–37)
MCV RBC AUTO: 91.2 FL
MONOCYTES # BLD AUTO: 0.73 X10(3) UL (ref 0.1–1)
MONOCYTES NFR BLD AUTO: 7.8 %
NEUTROPHILS # BLD AUTO: 7.78 X10 (3) UL (ref 1.5–7.7)
NEUTROPHILS # BLD AUTO: 7.78 X10(3) UL (ref 1.5–7.7)
NEUTROPHILS NFR BLD AUTO: 83.4 %
OSMOLALITY SERPL CALC.SUM OF ELEC: 304 MOSM/KG (ref 275–295)
PHOSPHATE SERPL-MCNC: 2.3 MG/DL (ref 2.4–5.1)
PLATELET # BLD AUTO: 186 10(3)UL (ref 150–450)
POTASSIUM SERPL-SCNC: 3.4 MMOL/L (ref 3.5–5.1)
PROT SERPL-MCNC: 5.7 G/DL (ref 5.7–8.2)
RBC # BLD AUTO: 3.87 X10(6)UL
SODIUM SERPL-SCNC: 144 MMOL/L (ref 136–145)
WBC # BLD AUTO: 9.3 X10(3) UL (ref 4–11)

## 2024-03-06 PROCEDURE — 99232 SBSQ HOSP IP/OBS MODERATE 35: CPT | Performed by: INTERNAL MEDICINE

## 2024-03-06 PROCEDURE — 99232 SBSQ HOSP IP/OBS MODERATE 35: CPT | Performed by: HOSPITALIST

## 2024-03-06 PROCEDURE — 99223 1ST HOSP IP/OBS HIGH 75: CPT | Performed by: INTERNAL MEDICINE

## 2024-03-06 RX ORDER — POTASSIUM CHLORIDE 14.9 MG/ML
20 INJECTION INTRAVENOUS ONCE
Status: COMPLETED | OUTPATIENT
Start: 2024-03-06 | End: 2024-03-06

## 2024-03-06 RX ORDER — TRAMADOL HYDROCHLORIDE 50 MG/1
25 TABLET ORAL EVERY 6 HOURS PRN
Status: DISCONTINUED | OUTPATIENT
Start: 2024-03-06 | End: 2024-03-07

## 2024-03-06 RX ORDER — MAGNESIUM OXIDE 400 MG/1
400 TABLET ORAL ONCE
Status: COMPLETED | OUTPATIENT
Start: 2024-03-06 | End: 2024-03-06

## 2024-03-06 RX ORDER — HYDROCODONE BITARTRATE AND ACETAMINOPHEN 5; 325 MG/1; MG/1
1 TABLET ORAL EVERY 6 HOURS PRN
Status: DISCONTINUED | OUTPATIENT
Start: 2024-03-06 | End: 2024-03-06

## 2024-03-06 NOTE — PROGRESS NOTES
[facility] SOAP Note    Teofilo Espinoza Patient Status:  Inpatient    1935 MRN [id number]   Location [facility] 4W/SW/SE Attending Toña Schuler,    Hosp Day # 2 PCP JOCELINE MEJÍA, MD JAYLA       [facility] received order for PHQ-4 follow up. Spoke to RN and reviewed chart. PHQ score on admission = 8.     Patient seen resting in hospital bed, family present and pt consented to translating on his behalf.  Patient expressed feeling particularly anxious due to presence of providers coming in and out of the room. Patient endorsed feeling nervous, anxious, and on edge everyday for the last two weeks. Patient reports poor mood and anhedonia for more than half the days over the last two weeks. Patient denies significant changes in appetite.      Psychoeducation regarding the benefits of counseling and available resources were offered although pt declined referrals for counseling/ psychiatry at this time.     Pt denies auditory and visual hallucinations.   Pt denies suicidal and homicidal ideation.   Denies hx of suicide attempts.   Denies hx of psychiatric hospitalizations.      Pt declined referrals for psychiatry and/or counseling.      MICHAEL Chao  [id number]

## 2024-03-06 NOTE — CONSULTS
[facility]   Gastroenterology Consultation Note    Teofilo Espinoza  Patient Status:    Inpatient  Date of Admission:         3/4/2024, Hospital day #2  Attending:   Toña Schuler DO  PCP:     JOCELINE MEJÍA, MD JAYLA    Reason for Consultation:  Rectal Bleeding    History of Present Illness:  Teofilo Espinoza is a 88 year old male w/ PMHx of BMI 22.34, metastatic pancreatic CA (declined systemic chemotherapy after evaluation by oncology), anxiety, aortic stenosis, HTN, prostate CA, renal stones who presents to the ED for poor appetite, nausea, vomiting last week.  He was found to be hypercalcemic and dehydrated.  Pt noted to have bloody bowel movement yesterday, for which GI was consulted.     Pt's two daughters at bedside throughout visit and assisted with history.      Pt's daughter reports that he had 1 bloody stool approximately 2 weeks ago without recurrence but he has also had blood in his urine so sometimes it is difficult to assess.  RN reported maroon colored blood mixed with stool yesterday.  No BM overnight or today.  Pt's daughter reports that he has been previously admitted for diverticular bleeding in the past.  Last colonoscopy completed for acute rectal bleeding so polyps discovered were not removed with recommendation to complete next colonoscopy for polypectomy once stable as outpatient.  However, repeat colonoscopy was never completed.  Currently, Pt denies ABD pain, nausea, heartburn, difficult/painful swallowing, constipation, diarrhea, fever, chills, chest pain or SOB.    Pertinent Family Hx:  - No known history of esophageal, gastric or colon cancers  - No known IBD  - No known liver pathologies    Endoscopy Hx:  - EGD/Colonoscopy 1/24/2020  Impression:  1. Overall, suspected diverticular bleed now resolved  2. Moderate to severe diverticulosis throughout the entire colon  3. A large 4-5 cm rectal polyp not removed at this time  4. At least 3 other colon  polyps visualized but not removed at this time  5. Medium sized non-bleeding hemorrhoids  6. A small gastric polyp biopsied  7. Otherwise, normal upper endoscopy and ileocolonoscopy    Final Diagnosis:      A. Gastric polyp:  Hyperplastic polyp.  No dysplasia or metaplasia is identified.  Diff-Quik stain (with appropriate control reactivity) is negative for H. pylori microorganisms.      B. Rectum polyp:  Tubular adenoma.     Social Hx:  - No tobacco use  - No ETOH  - Denies cannabis/illicit drug use  - Lives with family     History:  Past Medical History:   Diagnosis Date    Anxiety     Aortic stenosis     Hypertension     Lipid screening 04/29/2009    Pancreatic cancer (HCC)     Pancreatic cancer metastasized to liver    Prostate cancer (HCC) 2011    stage II, [id number], [place] 3+4=7 with pretreatment PSA of 6.3 s/p Cyberknife radiosurgery in 9/2011    Renal stones     Screening PSA (prostate specific antigen) 05/09/2009     Past Surgical History:   Procedure Laterality Date    COLONOSCOPY N/A 1/24/2020    Procedure: COLONOSCOPY;  Surgeon: Deonte Carlin MD;  Location: [facility] ENDOSCOPY    LITHOTRIPSY       Family History   Problem Relation Age of Onset    Breast Cancer Sister 45    Infectious Disease Brother     Bleeding Disorders Neg     DVT/VTE Neg       reports that he has never smoked. He has never used smokeless tobacco. He reports that he does not drink alcohol and does not use drugs.    Allergies:  No Known Allergies    Medications:    Current Facility-Administered Medications:     metoclopramide (Reglan) 5 mg/mL injection 5 mg, 5 mg, Intravenous, TID AC and HS    morphINE PF 2 MG/ML injection 1 mg, 1 mg, Intravenous, Q2H PRN **OR** morphINE PF 2 MG/ML injection 2 mg, 2 mg, Intravenous, Q2H PRN **OR** morphINE PF 4 MG/ML injection 4 mg, 4 mg, Intravenous, Q2H PRN    HYDROcodone-acetaminophen (Norco) 5-325 MG per tab 1 tablet, 1 tablet, Oral, Q6H WA    morphINE PF 4 MG/ML injection 4 mg, 4 mg, Intravenous,  Once    sodium chloride 0.9% infusion, , Intravenous, Continuous    acetaminophen (Tylenol Extra Strength) tab 500 mg, 500 mg, Oral, Q4H PRN    ondansetron (Zofran) 4 MG/2ML injection 4 mg, 4 mg, Intravenous, Q6H PRN    hydrALAzine (Apresoline) 20 mg/mL injection 10 mg, 10 mg, Intravenous, Q4H PRN    ALPRAZolam (Xanax) tab 0.5 mg, 0.5 mg, Oral, TID PRN    zolpidem (Ambien) tab 10 mg, 10 mg, Oral, Nightly    amLODIPine (Norvasc) tab 5 mg, 5 mg, Oral, Daily    atorvastatin (Lipitor) tab 10 mg, 10 mg, Oral, Daily    losartan (Cozaar) tab 100 mg, 100 mg, Oral, Daily    pantoprazole (Protonix) DR tab 40 mg, 40 mg, Oral, QAM AC    Review of Systems:   CONSTITUTIONAL:  negative for fevers, chills, unintentional weight loss   EYES:  negative for diplopia or change in vision   RESPIRATORY:  negative for severe shortness of breath  CARDIOVASCULAR:  negative for crushing sub-sternal chest pain  GASTROINTESTINAL:  see HPI  GENITOURINARY:  negative for dysuria or gross hematuria  INTEGUMENT/BREAST:  SKIN:  negative for jaundice or new rash   ALLERGIC/IMMUNOLOGIC:  negative for hay fever   ENDOCRINE:  negative for cold intolerance and heat intolerance  MUSCULOSKELETAL:  negative for joint effusion/severe erythema  NEURO: negative for new loss of consciousness or dizziness   BEHAVIOR/PSYCH:  negative for psychotic behavior    Physical Exam:    Blood pressure 159/57, pulse 98, temperature 97.9 °F (36.6 °C), temperature source Oral, resp. rate 16, height 6' (1.829 m), weight 164 lb 11.2 oz (74.7 kg), SpO2 97%. Body mass index is 22.34 kg/m².    Gen: awake, drowsy patient, NAD  HEENT: EOMI, the sclera appears anicteric, oropharynx clear, mucus membranes appear moist  CV: RRR  Lung: no conversational dyspnea   Abdomen: soft NTND abdomen with NABS appreciated   Back: No CVA tenderness   Skin: dry, warm, no jaundice  Ext: non-pitting LE edema is evident  Neuro: Drowsy, oriented x3 and interactive  Psych: calm, cooperative    Laboratory  Data:  Lab Results   Component Value Date    WBC 9.3 03/06/2024    HGB 12.1 03/06/2024    HCT 35.3 03/06/2024    .0 03/06/2024    CREATSERUM 1.14 03/06/2024    BUN 31 03/06/2024     03/06/2024    K 3.4 03/06/2024     03/06/2024    CO2 29.0 03/06/2024    GLU 91 03/06/2024    CA 11.6 03/06/2024    ALB 3.3 03/06/2024    ALKPHO 142 03/06/2024    BILT 0.8 03/06/2024    TP 5.7 03/06/2024    AST 58 03/06/2024    ALT 22 03/06/2024    MG 1.8 03/06/2024    PHOS 2.3 03/06/2024       Imaging:  CT ABDOMEN+PELVIS(CONTRAST ONLY)(CPT=74177)    Result Date: 3/4/2024  CONCLUSION:   Significantly increased bilobar hepatic masses  Increased pancreatic tail lesion  Stable crescent shaped enhancing mass along the posterior wall of the low rectum  New small volume ascites    Dictated by (CST): Jeevan Avalos MD on 3/04/2024 at 1:38 PM     Finalized by (CST): Jeevan Avalos MD on 3/04/2024 at 1:45 PM           Assessment & Plan   Teofilo Espinoza is a 88 year old male w/ PMHx of BMI 22.34, metastatic pancreatic CA (declined systemic chemotherapy after evaluation by oncology), anxiety, aortic stenosis, HTN, prostate CA, renal stones who presents to the ED for poor appetite, nausea, vomiting last week.  He was found to be hypercalcemic and dehydrated.  Pt noted to have bloody bowel movement yesterday, for which GI was consulted.     #Hematochezia  -RN reports maroon blood mixed with loose stool yesterday.  No recurrence overnight or today.    -Pt's daughter reports similar episode ~2 weeks ago at home with hx of diverticular bleed in the past  -Hgb stable at 12 from baseline of 13  -Etiology possible recurrence of diverticular bleed vs neoplasm  -Discussed possible colonoscopy if bleeding recurs or drop in Hgb, Pt states that he is not sure he is willing to undergo procedure.  Currently having ongoing GOC discussion and is meeting with hospice on Friday, when his son/POA [place].  -Recommend continuing  to monitor and trend CBC    Recommend:  -Diet as tolerated  -Monitor for overt GIB  -Trend Hgb    GI available as needed.  Please call with further questions or concerns.    Thank you for the opportunity to participate in the care of this patient.    Case discussed with Hanna Villarreal MD and Heidi FARMER.    Brina Sandy DNP, [facility] Gastroenterology  3/6/2024

## 2024-03-06 NOTE — DIETARY NOTE
ADULT NUTRITION INITIAL ASSESSMENT    Pt is at high nutrition risk.  Pt meets severe malnutrition criteria.      CRITERIA FOR MALNUTRITION DIAGNOSIS:  Criteria for severe malnutrition diagnosis: acute illness/injury related to wt loss greater than 7.5% in 3 months, energy intake less than 50% for greater than 5 days, and body fat moderate depletion.    RECOMMENDATIONS TO MD: See Nutrition Intervention    ADMITTING DIAGNOSIS:  Hypercalcemia [E83.52]  Malignant neoplasm of pancreas, unspecified location of malignancy (HCC) [C25.9]  PERTINENT PAST MEDICAL HISTORY:   Past Medical History:   Diagnosis Date    Anxiety     Aortic stenosis     Hypertension     Lipid screening 04/29/2009    Pancreatic cancer (HCC)     Pancreatic cancer metastasized to liver    Prostate cancer (HCC) 2011    stage II, P8sT2D1, [place] 3+4=7 with pretreatment PSA of 6.3 s/p Cyberknife radiosurgery in 9/2011    Renal stones     Screening PSA (prostate specific antigen) 05/09/2009       PATIENT STATUS: Initial 03/06/24: Pt admit for Hypercalcemia, nausea/vomiting x1 week. PMH sig for stage 4 pancreatic cancer with liver metastasis, HTN, CKD3, and as seen above. Pt assessed due to screening at risk for weight loss. Visited pt and daughter at bedside. Pt not in good spirits, appeared apathic. Daughter reports pt has had poor appetite for the last 3 months, and his intakes decreased to about  25% of meals in the last 5-7 days due to nausea and vomiting. Denied diarrhea. Denied difficulty or swallowing, prefers mechanical soft and liquid foods 2/2 fatigue while eating. Daughter reported pt will eat broths and oatmeal only. Pt refuses Ensure or any other ONS. Weight hx reveals pt has had excessive unintentional weight loss, daughter corroborated this finding, reported pt with wt loss of about 30 lbs. Weight at previous hospital admission in August 2023 was around 197 lbs, CBW: 164 lbs; this represents a 16% weight loss in 6 months, significant. Pt  refused NFPE, but visually writer noted moderate temporal and clavicle muscle wasting, moderate fat loss in triceps area. Pt meets criteria for Severe PCM in the context of acute illness. RD encouraged increased PO intakes, prioritizing foods with protein to try to halt continued loss of lean muscle mass, daughter verbalized understanding. RD offered a variety of ONS, high kcal/protein snacks, but pt refused. Pt made a comment to his daughter \"she's one of them\", writer asked for clarification; daughter responded pt is fatigued from medical interventions, want to go home \"to be in peace\", will not try any ONS, and will ot increase his intakes, will eat as able. RD acknowledged pt is going through difficult times, offered pt and daughter to be consulted if any questions arose. Writer noted family is having discussion of palliative vs hospice care. RD available for consult.      FOOD/NUTRITION RELATED HISTORY:  Appetite: Poor  Intake: ~% x2 meals documented since admit , pt only ordering broths/oatmeal.  Intake Meeting Needs: No  Percent Meals Eaten (last 6 days)       Date/Time Percent Meals Eaten (%)    03/06/24 1420 50 %            Food Allergies: No Known Food Allergies (NKFA)  Cultural/Ethnic/Mormon Preferences: None    GASTROINTESTINAL: +BM today    MEDICATIONS: reviewed   potassium phosphate dibasic 15 mmol in sodium chloride 0.9% 250 mL IVPB  15 mmol Intravenous Once    Followed by    potassium chloride  20 mEq Intravenous Once    metoprolol tartrate  12.5 mg Oral 2x Daily(Beta Blocker)    metoclopramide  5 mg Intravenous TID AC and HS    morphINE  4 mg Intravenous Once    zolpidem  10 mg Oral Nightly    amLODIPine  5 mg Oral Daily    atorvastatin  10 mg Oral Daily    losartan  100 mg Oral Daily    pantoprazole  40 mg Oral QAM AC     LABS: reviewed noted low Phos  Recent Labs     03/04/24  1140 03/05/24  0432 03/05/24  1726 03/06/24  0457   * 77  --  91   BUN 28* 30*  --  31*   CREATSERUM 1.50*  1.25  --  1.14   CA 13.4* 12.4* 11.9* 11.6*   MG  --   --   --  1.8    142  --  144   K 3.2* 3.5  3.5  --  3.4*    107  --  112   CO2 31.0 29.0  --  29.0   PHOS  --   --   --  2.3*   OSMOCALC 302* 299*  --  304*       NUTRITION RELATED PHYSICAL FINDINGS:  - Nutrition Focused Physical Exam (NFPE): moderate depletion body fat triceps region and moderate depletion muscle mass temple region and clavicle region  - Fluid Accumulation: none  See RN documentation for details  - Skin Integrity: intact See RN documentation for details    ANTHROPOMETRICS:  HT: 182.9 cm (6')  WT: 74.7 kg (164 lb 11.2 oz)   BMI: Body mass index is 22.34 kg/m².  BMI CLASSIFICATION: 19-24.9 kg/m2 - WNL  IBW: 178 lbs        92% IBW  Usual Body Wt: 197 lbs 6 months ago      83% UBW    WEIGHT HISTORY:  Patient Weight(s) for the past 336 hrs:   Weight   03/04/24 1854 74.7 kg (164 lb 11.2 oz)     Wt Readings from Last 10 Encounters:   03/04/24 74.7 kg (164 lb 11.2 oz)   08/23/23 87.5 kg (193 lb)   08/16/23 89.4 kg (197 lb)   08/15/23 89.4 kg (197 lb)   12/30/21 95.7 kg (211 lb)   12/30/20 85.2 kg (187 lb 12.8 oz)   02/27/20 87 kg (191 lb 12.8 oz)   01/30/20 82.1 kg (181 lb 1.6 oz)   01/24/20 81.8 kg (180 lb 5.4 oz)   11/12/14 94.9 kg (209 lb 3.2 oz)     NUTRITION DIAGNOSIS/PROBLEM:   Severe Malnutrition related to Acute - Physiological causes resulting in anorexia or diminished intake 2/2 stage 4 pancreatic cancer metastasis as evidenced by wt loss greater than 7.5% in 3 months, energy intake less than 50% for greater than 5 days, and body fat moderate depletion    NUTRITION INTERVENTION:     NUTRITION PRESCRIPTION:   Estimated Nutrition needs: --dosing wt of 74.7 kg - wt taken on 3/04/24  Calories: 2240 - 2390 calories/day (30-32 calories per kg Dosing wt)  Protein:  g protein/day (1.3-1.5 g protein/kg Dosing wt)  Fluid Needs: 4569-0454  ml/day (1ml/kcal)    - Diet:       Procedures    Regular/General diet Texture Consistency:  Chopped / Soft & Bite Sized; Is Patient on Accuchecks? No; Is Patient on Suicide Precautions? No      - RD Malnutrition Care Plan:  encouraged increased PO intakes  - Meals and snacks:  no other intervention, high kcal/PRO meals/snacks refused  - Medical Food Supplements-RD added None and Patient declined. Rational/use of oral supplements discussed.  - Vitamin and mineral supplements: none  - Feeding assistance: meal set up and feed  - Nutrition education: Discussed importance of adequate energy and protein intake and ability of ONS to help halt loss of lean muscle mass    - Coordination of nutrition care: referral to other providers  - Discharge and transfer of nutrition care to new setting or provider: monitor plans    MONITOR AND EVALUATE/NUTRITION GOALS:  - Food and Nutrient Intake:      Monitor: adequacy of PO intake  - Food and Nutrient Administration:      Monitor: N/A  - Anthropometric Measurement:    Monitor weight  - Nutrition Goals:      halt wt loss and      DIETITIAN FOLLOW UP: RD to follow and monitor nutrition status.    Moni Lamb RD, LDN  Clinical Dietitian  Office: [phone]

## 2024-03-06 NOTE — PLAN OF CARE
Problem: Patient Centered Care  Goal: Patient preferences are identified and integrated in the patient's plan of care  Description: Interventions:  - What would you like us to know as we care for you? From home with family and they are his support system.  Patient has Stage 4 Pancreatic CA that had metastasize on his liver.   - Provide timely, complete, and accurate information to patient/family  - Incorporate patient and family knowledge, values, beliefs, and cultural backgrounds into the planning and delivery of care  - Encourage patient/family to participate in care and decision-making at the level they choose  - Honor patient and family perspectives and choices  Outcome: Progressing     Problem: Patient/Family Goals  Goal: Patient/Family Long Term Goal  Description: Patient's Long Term Goal: Will continue to regain strength and pain will be controlled with oral medication.    Interventions:  - Check Calcium level per PCP order.  - Hospice/comfort care.  - Administer pain medication as ordered.  - Monitor for blood stools and refer as needed.  - Manage weight and nutrition.   - See additional Care Plan goals for specific interventions  Outcome: Progressing  Goal: Patient/Family Short Term Goal  Description: Patient's Short Term Goal: Home with hospice care    Interventions:   - Check Calcium level per PCP order.  - Hospice/comfort care.  - Administer pain medication as ordered.  - Monitor for blood stools and refer as needed.  - Manage weight and nutrition.   - Heparin subcutaneous to prevent blood clots.  - PT/OT as ordered.  - See additional Care Plan goals for specific interventions  Outcome: Progressing     Problem: PAIN - ADULT  Goal: Verbalizes/displays adequate comfort level or patient's stated pain goal  Description: INTERVENTIONS:  - Encourage pt to monitor pain and request assistance  - Assess pain using appropriate pain scale  - Administer analgesics based on type and severity of pain and evaluate  response  - Implement non-pharmacological measures as appropriate and evaluate response  - Consider cultural and social influences on pain and pain management  - Manage/alleviate anxiety  - Utilize distraction and/or relaxation techniques  - Monitor for opioid side effects  - Notify MD/LIP if interventions unsuccessful or patient reports new pain  - Anticipate increased pain with activity and pre-medicate as appropriate  Outcome: Progressing     Problem: SAFETY ADULT - FALL  Goal: Free from fall injury  Description: INTERVENTIONS:  - Assess pt frequently for physical needs  - Identify cognitive and physical deficits and behaviors that affect risk of falls.  - Russell fall precautions as indicated by assessment.  - Educate pt/family on patient safety including physical limitations  - Instruct pt to call for assistance with activity based on assessment  - Modify environment to reduce risk of injury  - Provide assistive devices as appropriate  - Consider OT/PT consult to assist with strengthening/mobility  - Encourage toileting schedule  Outcome: Progressing     Problem: DISCHARGE PLANNING  Goal: Discharge to home or other facility with appropriate resources  Description: INTERVENTIONS:  - Identify barriers to discharge w/pt and caregiver  - Include patient/family/discharge partner in discharge planning  - Arrange for needed discharge resources and transportation as appropriate  - Identify discharge learning needs (meds, wound care, etc)  - Arrange for interpreters to assist at discharge as needed  - Consider post-discharge preferences of patient/family/discharge partner  - Complete POLST form as appropriate  - Assess patient's ability to be responsible for managing their own health  - Refer to Case Management Department for coordinating discharge planning if the patient needs post-hospital services based on physician/LIP order or complex needs related to functional status, cognitive ability or social support  system  Outcome: Progressing     Problem: Altered Communication/Language Barrier  Goal: Patient/Family is able to understand and participate in their care  Description: Interventions:  - Assess communication ability and preferred communication style  - Implement communication aides and strategies  - Use visual cues when possible  - Listen attentively, be patient, do not interrupt  - Minimize distractions  - Allow time for understanding and response  - Establish method for patient to ask for assistance (call light)  - Provide an  as needed  - Communicate barriers and strategies to overcome with those who interact with patient  Outcome: Progressing     Problem: GASTROINTESTINAL - ADULT  Goal: Minimal or absence of nausea and vomiting  Description: INTERVENTIONS:  - Maintain adequate hydration with IV or PO as ordered and tolerated  - Nasogastric tube to low intermittent suction as ordered  - Evaluate effectiveness of ordered antiemetic medications  - Provide nonpharmacologic comfort measures as appropriate  - Advance diet as tolerated, if ordered  - Obtain nutritional consult as needed  - Evaluate fluid balance  Outcome: Progressing  Goal: Maintains or returns to baseline bowel function  Description: INTERVENTIONS:  - Assess bowel function  - Maintain adequate hydration with IV or PO as ordered and tolerated  - Evaluate effectiveness of GI medications  - Encourage mobilization and activity  - Obtain nutritional consult as needed  - Establish a toileting routine/schedule  - Consider collaborating with pharmacy to review patient's medication profile  Outcome: Progressing  Goal: Maintains adequate nutritional intake (undernourished)  Description: INTERVENTIONS:  - Monitor percentage of each meal consumed  - Identify factors contributing to decreased intake, treat as appropriate  - Assist with meals as needed  - Monitor I&O, WT and lab values  - Obtain nutritional consult as needed  - Optimize oral hygiene and  moisture  - Encourage food from home; allow for food preferences  - Enhance eating environment  Outcome: Progressing     Problem: HEMATOLOGIC - ADULT  Goal: Maintains hematologic stability  Description: INTERVENTIONS  - Assess for signs and symptoms of bleeding or hemorrhage  - Monitor labs and vital signs for trends  - Administer supportive blood products/factors, fluids and medications as ordered and appropriate  - Administer supportive blood products/factors as ordered and appropriate  Outcome: Progressing  Goal: Free from bleeding injury  Description: (Example usage: patient with low platelets)  INTERVENTIONS:  - Avoid intramuscular injections, enemas and rectal medication administration  - Ensure safe mobilization of patient  - Hold pressure on venipuncture sites to achieve adequate hemostasis  - Assess for signs and symptoms of internal bleeding  - Monitor lab trends  - Patient is to report abnormal signs of bleeding to staff  - Avoid use of toothpicks and dental floss  - Use electric shaver for shaving  - Use soft bristle tooth brush  - Limit straining and forceful nose blowing  Outcome: Progressing    Patient is alert and oriented x3, primarily Syriac speaking, daughter at bedside, aware to call for help as needed.  Patient is currently in room air, denies shortness of breathing nor chest pain.  Patient is up with walker with assist from staff and family.  Patient at times goes to the bathroom or at times uses the primofit if he's weak to get up to the bathroom.  Patient denies having a BM today.  Patient plasn to go home with family when stable.

## 2024-03-06 NOTE — CONSULTS
Oncology Consultation Note    Patient Name: Teofilo Espinoza   YOB: 1935   Medical Record Number: [id number]   CSN: [id number]  Consulting Physician: Marino Corrales MD  Referring Physician(s): Lilly Evans MD  Date of Consultation: 3/5/2024     Chief Complaint:  Liver lesions consistent with metastatic pancreatic cancer admitted with nausea, abdominal pain, hypercalcemia, decreased po intake    History of Present Illness:     Teofilo Espinoza is a 88 year old male that was seen initially in the Cancer Center for evaluation of the above condition in 8/2023. Patient opted for his granddaughter, Julianne, to translate on his behalf. Patient is relevant for prostate cancer diagnosed in 2011, stage II, [id number], [place] 3+4=7 with pretreatment PSA of 6.3 s/p Cyberknife radiosurgery in 9/2011. Teofilo is being based on CT imaging followed by MRI imaging concerning for pancreatic tail lesion with findings concerning for metastatic disease in the liver. The patient mentions constipation for about 3 days after eating meat, relieved after having a bowel movement. Ibuprofen helps with the pain. Patient denies any significant decreased appetite or unintentional weight loss. He has some fatigue, but remains active with mowing the lawn. He denies reports of bleeding or bruising.  Review of systems is negative for systemic signs of illness or any other concerns on ROS.    Patient is status post CT scan imaging in 8/2023 showing multiple liver lesions consistent with metastatic disease.  Pancreatic mass at the tail noted as well.  He has no other clear signs of distant metastatic disease.  Teofilo underwent liver biopsy on 8/17/2023 with adenocarcinoma most likely of pancreatobiliary or upper GI primary.     Hospital Course:  Patient's daughters are the bedside and opt to translate on Teofilo's behalf. He was brought in because he is not eating or drinking very much at home. He has developed nausea and abdominal pain.  On presentation to the ED his calcium level was over 13 and his labs showed an HEATH w/ an inc Creatinine level. Pt was given IVF and renal function has improved. Patient reports he is still not interested in treatment for his cancer. Patient and his family are seeking help at home from hospice care for support and pain.  No other concerns on ROS.    Past Medical History:  Past Medical History:   Diagnosis Date    Anxiety     Aortic stenosis     Hypertension     Lipid screening 04/29/2009    Pancreatic cancer (HCC)     Pancreatic cancer metastasized to liver    Prostate cancer (HCC) 2011    stage II, [id number], [place] 3+4=7 with pretreatment PSA of 6.3 s/p Cyberknife radiosurgery in 9/2011    Renal stones     Screening PSA (prostate specific antigen) 05/09/2009       Past Surgical History:  Past Surgical History:   Procedure Laterality Date    COLONOSCOPY N/A 1/24/2020    Procedure: COLONOSCOPY;  Surgeon: Deonte Carlin MD;  [facility] ENDOSCOPY    LITHOTRIPSY         Family Medical History:  Family History   Problem Relation Age of Onset    Breast Cancer Sister 45    Infectious Disease Brother     Bleeding Disorders Neg     DVT/VTE Neg        Social History:  Social History     Socioeconomic History    Marital status:      Spouse name: Not on file    Number of children: Not on file    Years of education: Not on file    Highest education level: Not on file   Occupational History    Not on file   Tobacco Use    Smoking status: Never    Smokeless tobacco: Never   Vaping Use    Vaping Use: Never used   Substance and Sexual Activity    Alcohol use: No     Alcohol/week: 0.0 standard drinks of alcohol    Drug use: No     Comment: No history of illicit substance abuse    Sexual activity: Not on file   Other Topics Concern    Not on file   Social History Narrative    Teofilo is  since 2010. Patient has 6 adult children. He is retired from [employer]. Patient lives alone in [place], [place]. He enjoys  gardening and mowing the lawn, watching the news. Patient was born in [place] and has been in the [place] since [place].      Social Determinants of Health     Financial Resource Strain: Not on file   Food Insecurity: No Food Insecurity (3/4/2024)    Food Insecurity     Food Insecurity: Never true   Transportation Needs: No Transportation Needs (3/4/2024)    Transportation Needs     Lack of Transportation: No   Physical Activity: Not on file   Stress: Not on file   Social Connections: Not on file   Housing Stability: Low Risk  (3/4/2024)    Housing Stability     Housing Instability: No     Housing Instability Emergency: Not on file       Allergies:   No Known Allergies    Current Medications:  Medications Reviewed    Review of Systems:    Constitutional: Negative for anorexia, chills, fatigue, fevers, night sweats and weight loss. +Decreased oral intake  Eyes: Negative for visual disturbance, irritation and redness.  Respiratory: Negative for cough, hemoptysis, chest pain, or dyspnea on exertion.  Gastrointestinal: Negative for dysphagia, odynophagia, reflux symptoms,  vomiting, change in bowel habits, diarrhea, constipation and +nausea, +abdominal pain.  Integument/breast: Negative for rash, skin lesions, and pruritus.  Hematologic/lymphatic: Negative for easy bruising, bleeding, and lymphadenopathy.  Musculoskeletal: Negative for myalgias, arthralgias, muscle weakness.  Neurological: Negative for headaches, dizziness, speech problems, gait problems and weakness.    A comprehensive 14 point review of systems was completed.  Pertinent positives and negatives noted in the the HPI.     Vital Signs:  /84 (BP Location: Right arm)   Pulse 88   Temp 98.8 °F (37.1 °C) (Temporal)   Resp 21   Ht 1.829 m (6')   Wt 74.7 kg (164 lb 11.2 oz)   SpO2 92%   BMI 22.34 kg/m²     Physical Examination:    General: Patient is alert and oriented x 3, not in acute distress.  Psych: Friendly, cooperative with appropriate questions  and responses  HEENT: EOMs intact. Oropharynx is clear.     Chest: Symmetrical lung expansion, no signs of respiratory distress.  Heart: Regular rate noted  Abdomen: Soft, non tender   Extremities: No edema, warm, well perfused  Neurological: Grossly intact.     Performance Status:    ECOG 4: Completely disabled. Cannot carry on any selfcare. Totally confined to bed or chair    Labs:    Lab Results   Component Value Date/Time    WBC 10.3 03/05/2024 05:26 PM    RBC 3.92 03/05/2024 05:26 PM    HGB 12.2 (L) 03/05/2024 05:26 PM    HCT 35.9 (L) 03/05/2024 05:26 PM    MCV 91.6 03/05/2024 05:26 PM    MCH 31.1 03/05/2024 05:26 PM    MCHC 34.0 03/05/2024 05:26 PM    RDW 14.4 03/05/2024 05:26 PM    NEPRELIM 8.64 (H) 03/05/2024 05:26 PM    .0 03/05/2024 05:26 PM       Lab Results   Component Value Date/Time    GLU 77 03/05/2024 04:32 AM    BUN 30 (H) 03/05/2024 04:32 AM    CREATSERUM 1.25 03/05/2024 04:32 AM    GFRNAA 73 12/31/2020 05:17 AM    CA 11.9 (H) 03/05/2024 05:26 PM    ALB 3.4 03/05/2024 04:32 AM     03/05/2024 04:32 AM    K 3.5 03/05/2024 04:32 AM    K 3.5 03/05/2024 04:32 AM     03/05/2024 04:32 AM    CO2 29.0 03/05/2024 04:32 AM    ALKPHO 155 (H) 03/05/2024 04:32 AM    AST 59 (H) 03/05/2024 04:32 AM    ALT 24 03/05/2024 04:32 AM       Imaging:    CT scan 3/4/2024    Impression   CONCLUSION:     Significantly increased bilobar hepatic masses     Increased pancreatic tail lesion     Stable crescent shaped enhancing mass along the posterior wall of the low rectum     New small volume ascites         Impression:      88 year old M with PMH relevant for prostate cancer diagnosed in 2011, stage II, [id number], [place] 3+4=7 with pretreatment PSA of 6.3 s/p Cyberknife radiosurgery in 9/2011 presents for an evaluation of liver lesions consistent with metastatic pancreatic cancer admitted w/ decreased oral intake, HEATH, nausea and abdominal pain in the setting of advancing cancer with  hypercalcemia    Plan:    1.) Pancreatic cancer spread to the liver, stage IV    --reviewed with pt and his family the primary pancreatic mass in tail, with liver mets in 8/2023  --discussed his pathology is consistent with metastatic pancreatic cancer in 8/23  --reviewed the CT scan re: full extent of disease per above  --I have informed the patient and his family his cancer is incurable but potentially treatable with chemotherapy  -- Reviewed options for no treatment, treatment with gemcitabine plus Abraxane or possible gemcitabine single agent after discussing the side effect profile  --He opted for no systemic treatment of his cancer in 8/2023 and is aware he will likely pass away from his disease within 6 to 9 months without treatment  --referred to genetics based on metastatic pancreatic cancer  --referred to palliative care in 8/2023, but pt's daughters mentioned that he never showed for the appt because he did not want to come back to the cancer center    --he and his family were informed the cancer has worsened. Teofilo is still not interested in chemotherapy treatment for his cancer and I respect and agree w/ his decision  --his performance status makes him ineligible for tx  --his family mentions that he is not ready for hospice care yet  --family is seeking palliative care consult to help with support care at home and pain needs; consult to palliative care placed and I discussed his care w/ Dr. Luna  --I will sign off now as he does not want tx for his cancer; does not need f/u in med onc    2.) History of prostate cancer    --checking a PSA based on hx of this cancer, but low suspicion for recurrent prostate cancer  --diagnosed in 2011 and underwent Cyberknife in 9/2011  --PSA was 0.02 on 8/15/2023; informed today that I have no concern for prostate cancer recurrence    3.) Weight Loss    --referral to palliative care, also needs to establish care for metastatic disease, HPA as the pt does not want  systemic tx, but he never showed up for the appt    4.) Hypercalcemia    --improved w/IVF; care discussed w/ primary care provider, Dr. Flynn, who is managing this condition  --I do not see signs of bone mets by current CT scan imaging    5.) HEATH    --Improved w/ IVF    Thank you for allowing me to take part in the care of this patient. I will sign off now as the pt does not want treatment for cancer. Please, call with additional questions and concerns    MDM: High Risk    Marino Corrales MD  [facility]  [address] [address]

## 2024-03-06 NOTE — PLAN OF CARE
Problem: Patient Centered Care  Goal: Patient preferences are identified and integrated in the patient's plan of care  Description: Interventions:  - What would you like us to know as we care for you? From home with family and they are his support system.  Patient has Stage 4 Pancreatic CA that had metastasize on his liver.   - Provide timely, complete, and accurate information to patient/family  - Incorporate patient and family knowledge, values, beliefs, and cultural backgrounds into the planning and delivery of care  - Encourage patient/family to participate in care and decision-making at the level they choose  - Honor patient and family perspectives and choices  Outcome: Progressing

## 2024-03-06 NOTE — CM/SW NOTE
HALEY followed up with [facility], Elena, to see if they can accept. Per previous documentation, family requesting informational meeting with [facility].     Elena with [facility] stated their agency called family and originally had a meeting scheduled for today, but was then cancelled. Per Elena, family stated they will call agency back when they wish to proceed.     Dr Cheryl Villavicencio at [facility] provided her direct phone number: [phone]. She is waiting for return call from family to determine plan.    Plan: Pending discussion with palliative care team/[facility]    Dabry Coronado, RN, BSN

## 2024-03-06 NOTE — OCCUPATIONAL THERAPY NOTE
03/06/24 1456   VISIT TYPE   OT Inpatient Visit Type  Attempted Evaluation  This therapist attempted initial eval x2; pt was no available. Pt was ambulating to bathroom with pt's daughter, using a R/W. Hospice pending. DC OT, skilled intervention is not indicated at this time.   OT Follow Up   Charge Missed visit   Follow Up Needed  No         Ton Moralez OTR/L  Occupational Therapy  [facility]

## 2024-03-06 NOTE — PROGRESS NOTES
PT orders received via functional mobility screen. Chart reviewed. Pt was observed amb short distances in room with staff min A with RW functional mobility screen complete. Pt requires assist with all mobility and ADL support. A hospice consult is pending d/t terminal pancreatic CA. D/C PT orders at this time to emphasize halfway level of care / palliative care for optimal quality of life.

## 2024-03-06 NOTE — PROGRESS NOTES
Palliative Care Progress Note    Teofilo Espinoza Patient Status:  Inpatient    1935 MRN [id number]   Location [facility] 4W/SW/SE Attending Jovan Flynn,*   Hosp Day # 2 PCP JOCELINE MEJÍA, MD JAYLA     Date of Consult: 3/5/2024  Patient seen at: [facility] Inpatient    Reason for Consultation: Consult requested for goals of care and care coordination.    Subjective     S: Feeling better today, appetite improved. Says that he hallucinated with [place] last night. He is tired of doctors/consultants. His chief complaint is : 'I want to not be here.\"     Review of Systems: Palliative Care symptom needs assessed:     Denies dypsnea.   Denies cough or lightheadedness/dizziness.   See below for current pain issues.   Diminished appetite  + n/v and moved bowels last ytd.   Denies constipation/diarrhea issues.   Denies depression or anxiety.      Palliative Care Social History:   Marital Status:    Children: Yes  Living Situation Prior to Admit: Home  Occupational History: Retired  Personal:     Spiritual  Teofilo Espinoza  NA     requested: No    Medical History: obtained from [facility]  Past Medical History:   Diagnosis Date    Anxiety     Aortic stenosis     Hypertension     Lipid screening 2009    Pancreatic cancer (HCC)     Pancreatic cancer metastasized to liver    Prostate cancer (HCC)     stage II, G2xS0W2, [place] 3+4=7 with pretreatment PSA of 6.3 s/p Cyberknife radiosurgery in 2011    Renal stones     Screening PSA (prostate specific antigen) 2009     Past Surgical History:   Procedure Laterality Date    COLONOSCOPY N/A 2020    Procedure: COLONOSCOPY;  Surgeon: Deonte Carlin MD;  Location: [facility] ENDOSCOPY    LITHOTRIPSY         Family History: obtained from [facility]  Family History   Problem Relation Age of Onset    Breast Cancer Sister 45    Infectious Disease Brother     Bleeding Disorders Neg     DVT/VTE Neg        Allergies:  No Known  Allergies    Medications:     Current Facility-Administered Medications:     potassium phosphate dibasic 15 mmol in sodium chloride 0.9% 250 mL IVPB, 15 mmol, Intravenous, Once **FOLLOWED BY** potassium chloride 20 mEq/100mL IVPB premix 20 mEq, 20 mEq, Intravenous, Once    metoprolol tartrate (Lopressor) partial tab 12.5 mg, 12.5 mg, Oral, 2x Daily(Beta Blocker)    traMADol (Ultram) tab 25 mg, 25 mg, Oral, Q6H PRN    metoclopramide (Reglan) 5 mg/mL injection 5 mg, 5 mg, Intravenous, TID AC and HS    morphINE PF 2 MG/ML injection 1 mg, 1 mg, Intravenous, Q2H PRN **OR** morphINE PF 2 MG/ML injection 2 mg, 2 mg, Intravenous, Q2H PRN **OR** morphINE PF 4 MG/ML injection 4 mg, 4 mg, Intravenous, Q2H PRN    morphINE PF 4 MG/ML injection 4 mg, 4 mg, Intravenous, Once    sodium chloride 0.9% infusion, , Intravenous, Continuous    acetaminophen (Tylenol Extra Strength) tab 500 mg, 500 mg, Oral, Q4H PRN    ondansetron (Zofran) 4 MG/2ML injection 4 mg, 4 mg, Intravenous, Q6H PRN    hydrALAzine (Apresoline) 20 mg/mL injection 10 mg, 10 mg, Intravenous, Q4H PRN    ALPRAZolam (Xanax) tab 0.5 mg, 0.5 mg, Oral, TID PRN    zolpidem (Ambien) tab 10 mg, 10 mg, Oral, Nightly    amLODIPine (Norvasc) tab 5 mg, 5 mg, Oral, Daily    atorvastatin (Lipitor) tab 10 mg, 10 mg, Oral, Daily    losartan (Cozaar) tab 100 mg, 100 mg, Oral, Daily    pantoprazole (Protonix) DR tab 40 mg, 40 mg, Oral, QAM AC  No current outpatient medications on file.         Palliative Performance Scale: 40 %  % Ambulation Activity Level Self-Care Intake Consciousness   100 Full  Normal  No Disease Full Normal Full   90 Full  Normal  Some Disease Full Normal Full   80 Full  Normal w/effort  Some Disease Full Normal or reduced Full   70 Reduced  Can't Perform Job  Some Disease Full Normal or reduced Full   60 Reduced  Can't Perform Hobby   Significant Disease Occ Assist Normal or reduced Full or confused   50 Mainly sit/lie Can't do any work  Extensive Disease Partial  Assist Normal or reduced Full or confused   40 Mainly in bed Can't do any work  Extensive Disease Mainly Assist Normal or reduced Full or confused   30 Bed Bound Can't do any work  Extensive Disease Max Assist  Total Care Reduced  Drowsy/confused   20 Bed Bound Can't do any work  Extensive Disease Max Assist  Total Care Minimal  Drowsy/confused   10 Bed Bound Can't do any work  Extensive Disease Max Assist  Total Care Mouth Care  Drowsy/confused   0 Death        Objective      Vital Signs:  Blood pressure (!) 151/94, pulse 118, temperature 98.1 °F (36.7 °C), temperature source Oral, resp. rate 12, height 6' (1.829 m), weight 164 lb 11.2 oz (74.7 kg), SpO2 100%.  Body mass index is 22.34 kg/m².  Non-verbal signs of pain present: Yes    Physical Exam:  General: Alert, awake and in no  apparent respiratory distress. Cachexia.  HEENT: AT/NC. No focal deficits.   Cardiac: RRR  Lungs: Normal effort on RA  Abdomen: Soft, distended, normal bowel sounds X 4 quadrants   Extremities: FROM of all limbs, no  Edema present  Skin: Warm and dry.    Hematology:  Lab Results   Component Value Date    WBC 9.3 03/06/2024    HGB 12.1 (L) 03/06/2024    HCT 35.3 (L) 03/06/2024    .0 03/06/2024       Coags:  Lab Results   Component Value Date    INR 1.12 08/15/2023    PTT 33.6 08/15/2023       Chemistry:  Lab Results   Component Value Date    CREATSERUM 1.14 03/06/2024    BUN 31 (H) 03/06/2024     03/06/2024    K 3.4 (L) 03/06/2024     03/06/2024    CO2 29.0 03/06/2024    GLU 91 03/06/2024    CA 11.6 (H) 03/06/2024    ALB 3.3 03/06/2024    ALKPHO 142 (H) 03/06/2024    BILT 0.8 03/06/2024    TP 5.7 03/06/2024    AST 58 (H) 03/06/2024    ALT 22 03/06/2024    PSA 0.02 08/15/2023    DDIMER 1.60 (H) 12/31/2020    MG 1.8 03/06/2024    PHOS 2.3 (L) 03/06/2024    TROP 0.116 (HH) 12/23/2020       Imaging:  No results found.    Assessment and Recommendations        Hypercalcemia    Malignant neoplasm of pancreas, unspecified  location of malignancy (HCC)      Symptom Management      Pain:  -epigastric abdominal pain 2/2 cancer burden  -stop Norco   -start tramadol 25mg q4-6hr prn  -prn morphine IV     Nausea/Cachexia:  -cont reglan 5mg Q6hr    Prevention of Constipation:    - Recommend Senna-S 8.6mg (2) tabs BID Scheduled   - Recommend Miralax 17g daily Scheduled   - Recommend Dulcolax suppository daily PRN    2.     Serious Illness Conversation:   Code Status: Full  POA: NA- surrogate is Scott, youngest son  Teofilo is feeling a little better. They are planning for meeting with hospice. He does not wish to be resuscitated or intubated. He does not want any more \"doctors, colonoscopies, tests etc.\" His son Scott is his surrogate. I will follow along with discharge/hospice planning.       Palliative Care Follow Up: Palliative care team will Continue to follow while inpatient    Thank you for allowing Palliative Care services to participate in the care of Teofilo Espinoza.    A total of 35 minutes were spent on this visit, which included all of the following: direct face to face contact, history taking, physical examination, and >50% was spent counseling and coordinating care.    Deonte Luna MD  Palliative Care Services  [facility] [phone]    Note to patient:  The 21st Century Cures Act makes medical notes like these available to patients in the interest of transparency. However, be advised this is a medical document. It is intended as peer to peer communication. It is written in medical language and may contain abbreviations or verbiage that are unfamiliar. It may appear blunt or direct. Medical documents are intended to carry relevant information, facts as evident, and the clinical opinion of the practitioner.

## 2024-03-07 VITALS
WEIGHT: 164.69 LBS | DIASTOLIC BLOOD PRESSURE: 90 MMHG | HEART RATE: 79 BPM | OXYGEN SATURATION: 97 % | HEIGHT: 72 IN | RESPIRATION RATE: 15 BRPM | SYSTOLIC BLOOD PRESSURE: 165 MMHG | BODY MASS INDEX: 22.31 KG/M2 | TEMPERATURE: 97 F

## 2024-03-07 LAB
C DIFF TOX B STL QL: NEGATIVE
CALCIUM BLD-MCNC: 10.8 MG/DL (ref 8.7–10.4)
MAGNESIUM SERPL-MCNC: 1.6 MG/DL (ref 1.6–2.6)
PHOSPHATE SERPL-MCNC: 1.3 MG/DL (ref 2.4–5.1)
POTASSIUM SERPL-SCNC: 3.3 MMOL/L (ref 3.5–5.1)

## 2024-03-07 PROCEDURE — 99239 HOSP IP/OBS DSCHRG MGMT >30: CPT | Performed by: HOSPITALIST

## 2024-03-07 RX ORDER — TRAMADOL HYDROCHLORIDE 50 MG/1
25 TABLET ORAL EVERY 6 HOURS PRN
Qty: 30 TABLET | Refills: 0 | Status: SHIPPED | OUTPATIENT
Start: 2024-03-07

## 2024-03-07 NOTE — SLP NOTE
SPEECH DAILY NOTE - INPATIENT    ASSESSMENT & PLAN   ASSESSMENT  ASSESSMENT  PPE REQUIRED. THIS THERAPIST WORE GLOVES AND DROPLET MASK FOR DURATION OF TX SESSION. HANDS WASHED UPON ENTRANCE/EXIT.     SLP f/u for ongoing meal assessment per recommendations of soft bite size solids and thin liquids per speech therapy recommendations. RN reports pt tolerates diet and medication well with no overt clinical overt clinical signs aspiration. RN approves swallowing therapy session. The pt seen at bedside and agreeable to participate in swallowing therapy.  family/caregiver present at the time of therapy and provided translation during therapy.  Pt denies any swallowing challenges. Pt denies any pain.  Pt prefers education via verbal explanation with translation.     Pt positioned upright in 90 degrees in bed. Pt alert, afebrile, tolerating room air with oxygen status 98% prior to the start of oral trials. SLP verbally reviewed aspiration precautions and safe swallowing compensatory strategies with the patient and family. Patient and family acknowledged understanding of swallowing precautions and the pt demonstrated use of precautions while self feeding po trials. Improving tolerates on po trials of puree, soft bite size solids, and thin liquids via cup with no overt clinical signs of aspiration (e.g., immediate/delayed throat clear, immediate/delayed cough, wet vocal quality, increased O2 effort) observed across all trials. Trial of soft/hard solids with prolonged mastication.  With increased time the pt was able to masticate soft solids and clear oral residue with an independent multiple dry swallow. Oxygen status remained stable at 98% throughout the entire session. Oral/buccal cavities clear of residue at the end of the session. Recommend to upgrade diet soft easy to chew solids and thin liquids with no straws.  Swallowing precautions posted in written format on white board to assist pt with carry over.  The pt was left  resting in bed with family present and call light in reach.     PLAN: Recommend to continue swallowing therapy 1-2x for meal assessment, train pt on swallowing precautions, monitor CXR, and VFSS if any overt CSA and/or decline in CXR. RNDidi alerted with results and recommendations. Updated diet order in chart.     Diet Recommendations - Solids: Soft/ Easy to chew  Diet Recommendations - Liquids: Thin Liquids    Compensatory Strategies Recommended: Slow rate;Alternate consistencies;Small bites and sips;No straws  Aspiration Precautions: Upright position;Slow rate;Small bites and sips;No straw  Medication Administration Recommendations: Whole in puree    Patient Experiencing Pain: No                Treatment Plan  Treatment Plan/Recommendations: Aspiration precautions    Interdisciplinary Communication: Discussed with RN  Plan posted at bedside  Recommendations posted at bedside            GOALS  Goal #1 The patient will tolerate soft bite sized consistency and thin liquids without overt signs or symptoms of aspiration with 100 % accuracy over 1-2 session(s).-MET    NEW GOAL:  The patient will tolerate soft east to chew consistency and thin liquids without overt signs or symptoms of aspiration with 100 % accuracy over 1-2 session(s).-  Goal Met.   Advanced goal on 3/7/24   Goal #2 The patient/family/caregiver will demonstrate understanding and implementation of aspiration precautions and swallow strategies independently over 1-2 session(s).    In Progress   Goal #3 The patient will tolerate trial upgrade of soft easy to chew/regular consistency and thin liquids without overt signs or symptoms of aspiration with 100 % accuracy over 1-2 session(s).  Partially met   Goal #4 The patient will utilize compensatory strategies as outlined by  BSSE (clinical evaluation) including Slow rate, Small bites, Small sips, No straws, Upright 90 degrees with PRN assistance 100 % of the time across 2 sessions.    In Progress      FOLLOW UP  Follow Up Needed (Documentation Required): Yes  SLP Follow-up Date: 03/08/24  Number of Visits to Meet Established Goals: 1    Session: 1 post BSSE    If you have any questions, please contact     Makenzie Null MS/CCC-SLP  Speech Language Pathologist  formerly [facility]  EXT. [phone]

## 2024-03-07 NOTE — DISCHARGE INSTRUCTIONS
[facility]  [address]  [address]  [place] [address]  Phone: [phone]  Fax: [phone]

## 2024-03-07 NOTE — PROGRESS NOTES
[facility]  part of [facility]    Progress Note    Teofilo Espinoza Patient Status:  Inpatient    1935 MRN [id number]   Location [facility] 4W/SW/SE Attending Toña Schuler, DO   Hosp Day # 2 PCP JOCELINE MEJÍA, MD JAYLA     Chief complaint bleeding     Subjective:   Teofilo Espinoza is a(n) 88 year old male Pt seen earlier today. Doing well. No c/o's. Asking about when he will be discharged     ROS:   No cp, sob   No c/d   No n/v     Objective:   Blood pressure (!) 151/94, pulse 118, temperature 98.1 °F (36.7 °C), temperature source Oral, resp. rate 12, height 6' (1.829 m), weight 164 lb 11.2 oz (74.7 kg), SpO2 100%.      Intake/Output Summary (Last 24 hours) at 3/6/2024 1806  Last data filed at 3/6/2024 1420  Gross per 24 hour   Intake 120 ml   Output 450 ml   Net -330 ml       Patient Weight(s) for the past 336 hrs:   Weight   24 1854 164 lb 11.2 oz (74.7 kg)           General appearance: alert, appears stated age and cooperative  Pulmonary:  clear to auscultation bilaterally  Cardiovascular: S1, S2 normal, no murmur, click, rub or gallop, regular rate and rhythm  Abdominal: soft, non-tender; bowel sounds normal; no masses,  no organomegaly  Extremities: extremities normal, atraumatic, no cyanosis or edema        Medicines:     Current Facility-Administered Medications   Medication Dose Route Frequency    potassium chloride 20 mEq/100mL IVPB premix 20 mEq  20 mEq Intravenous Once    metoprolol tartrate (Lopressor) partial tab 12.5 mg  12.5 mg Oral 2x Daily(Beta Blocker)    traMADol (Ultram) tab 25 mg  25 mg Oral Q6H PRN    metoclopramide (Reglan) 5 mg/mL injection 5 mg  5 mg Intravenous TID AC and HS    morphINE PF 2 MG/ML injection 1 mg  1 mg Intravenous Q2H PRN    Or    morphINE PF 2 MG/ML injection 2 mg  2 mg Intravenous Q2H PRN    Or    morphINE PF 4 MG/ML injection 4 mg  4 mg Intravenous Q2H PRN    morphINE PF 4 MG/ML injection 4 mg  4 mg Intravenous Once     sodium chloride 0.9% infusion   Intravenous Continuous    acetaminophen (Tylenol Extra Strength) tab 500 mg  500 mg Oral Q4H PRN    ondansetron (Zofran) 4 MG/2ML injection 4 mg  4 mg Intravenous Q6H PRN    hydrALAzine (Apresoline) 20 mg/mL injection 10 mg  10 mg Intravenous Q4H PRN    ALPRAZolam (Xanax) tab 0.5 mg  0.5 mg Oral TID PRN    zolpidem (Ambien) tab 10 mg  10 mg Oral Nightly    amLODIPine (Norvasc) tab 5 mg  5 mg Oral Daily    atorvastatin (Lipitor) tab 10 mg  10 mg Oral Daily    losartan (Cozaar) tab 100 mg  100 mg Oral Daily    pantoprazole (Protonix) DR tab 40 mg  40 mg Oral QAM AC       Lab Results   Component Value Date    WBC 9.3 03/06/2024    HGB 12.1 (L) 03/06/2024    HCT 35.3 (L) 03/06/2024    .0 03/06/2024    CREATSERUM 1.14 03/06/2024    BUN 31 (H) 03/06/2024     03/06/2024    K 3.4 (L) 03/06/2024     03/06/2024    CO2 29.0 03/06/2024    GLU 91 03/06/2024    CA 11.6 (H) 03/06/2024    ALB 3.3 03/06/2024    ALKPHO 142 (H) 03/06/2024    BILT 0.8 03/06/2024    TP 5.7 03/06/2024    AST 58 (H) 03/06/2024    ALT 22 03/06/2024    PTT 33.6 08/15/2023    INR 1.12 08/15/2023    T4F 1.2 03/05/2024    TSH 1.542 03/05/2024    LIP 42 03/04/2024    PSA 0.02 08/15/2023    DDIMER 1.60 (H) 12/31/2020    CRP 6.71 (H) 12/26/2020    MG 1.8 03/06/2024    PHOS 2.3 (L) 03/06/2024    TROP 0.116 (HH) 12/23/2020     (H) 12/23/2020       No results found.        Results:     CBC:    Lab Results   Component Value Date    WBC 9.3 03/06/2024    WBC 10.3 03/05/2024    WBC 9.4 03/05/2024     Lab Results   Component Value Date    HGB 12.1 (L) 03/06/2024    HGB 12.2 (L) 03/05/2024    HGB 11.7 (L) 03/05/2024      Lab Results   Component Value Date    .0 03/06/2024    .0 03/05/2024    .0 03/05/2024       Recent Labs   Lab 03/04/24  1140 03/05/24  0432 03/05/24  1726 03/06/24  0457   * 77  --  91   BUN 28* 30*  --  31*   CREATSERUM 1.50* 1.25  --  1.14   CA 13.4* 12.4* 11.9*  11.6*    142  --  144   K 3.2* 3.5  3.5  --  3.4*    107  --  112   CO2 31.0 29.0  --  29.0             Assessment and Plan:           Assessment & Plan:   Acute renal failure from  Dehydration. better  2.       Hypercalcemia creat cl improved. Will give zometa; check vit d level;pthrp; tsh no obvious bone mets; recheck dixei today   3.       Metastatic pancreatic cancer. Dr martinez to see  4.       Essential hypertension.  5.       Eol dw family to consider dnar/pall care/hospice will discuss among themselves; dr thorpe came but I did not call him, perhaps dr martinez did  6.       Rectal mass with brbpr: contacted dr burgos     Complex mdm; coordinating care with nurse/dr walker and counseling pt and with his permission his family in room about high calc/grim prognosis                  Toña Schuler DO         Chart reviewed, including current vitals, notes, labs and imaging  Labs ordered and medications adjusted as outlined above  Coordinate care with care team/consultants  Discussed with patient results of tests, management plan as outlined above, and the need for ongoing hospitalization  D/w RN     MDM high        3/6/2024

## 2024-03-07 NOTE — PLAN OF CARE
Patient AO x3, forgetful. V/s stable. Maroon colored stools noted. IVF running. 2 daughters at bedside assisting with care. Up with 1 and RW. Male purewick on, voiding. Care explained and rendered in [place]. SCDs on. Remote tele. Plan home, possible hospice. Call light within reach. Fall precautions.     Problem: Patient Centered Care  Goal: Patient preferences are identified and integrated in the patient's plan of care  Description: Interventions:  - What would you like us to know as we care for you? From home with family and they are his support system.  Patient has Stage 4 Pancreatic CA that had metastasize on his liver.   - Provide timely, complete, and accurate information to patient/family  - Incorporate patient and family knowledge, values, beliefs, and cultural backgrounds into the planning and delivery of care  - Encourage patient/family to participate in care and decision-making at the level they choose  - Honor patient and family perspectives and choices  Outcome: Progressing     Problem: Patient/Family Goals  Goal: Patient/Family Long Term Goal  Description: Patient's Long Term Goal: Will continue to regain strength and pain will be controlled with oral medication.    Interventions:  - Check Calcium level per PCP order.  - Hospice/comfort care.  - Administer pain medication as ordered.  - Monitor for blood stools and refer as needed.  - Manage weight and nutrition.   - See additional Care Plan goals for specific interventions  Outcome: Progressing  Goal: Patient/Family Short Term Goal  Description: Patient's Short Term Goal: Home with hospice care    Interventions:   - Check Calcium level per PCP order.  - Hospice/comfort care.  - Administer pain medication as ordered.  - Monitor for blood stools and refer as needed.  - Manage weight and nutrition.   - PT/OT as ordered.  - See additional Care Plan goals for specific interventions  Outcome: Progressing     Problem: PAIN - ADULT  Goal: Verbalizes/displays  adequate comfort level or patient's stated pain goal  Description: INTERVENTIONS:  - Encourage pt to monitor pain and request assistance  - Assess pain using appropriate pain scale  - Administer analgesics based on type and severity of pain and evaluate response  - Implement non-pharmacological measures as appropriate and evaluate response  - Consider cultural and social influences on pain and pain management  - Manage/alleviate anxiety  - Utilize distraction and/or relaxation techniques  - Monitor for opioid side effects  - Notify MD/LIP if interventions unsuccessful or patient reports new pain  - Anticipate increased pain with activity and pre-medicate as appropriate  Outcome: Progressing     Problem: SAFETY ADULT - FALL  Goal: Free from fall injury  Description: INTERVENTIONS:  - Assess pt frequently for physical needs  - Identify cognitive and physical deficits and behaviors that affect risk of falls.  - Star fall precautions as indicated by assessment.  - Educate pt/family on patient safety including physical limitations  - Instruct pt to call for assistance with activity based on assessment  - Modify environment to reduce risk of injury  - Provide assistive devices as appropriate  - Consider OT/PT consult to assist with strengthening/mobility  - Encourage toileting schedule  Outcome: Progressing     Problem: DISCHARGE PLANNING  Goal: Discharge to home or other facility with appropriate resources  Description: INTERVENTIONS:  - Identify barriers to discharge w/pt and caregiver  - Include patient/family/discharge partner in discharge planning  - Arrange for needed discharge resources and transportation as appropriate  - Identify discharge learning needs (meds, wound care, etc)  - Arrange for interpreters to assist at discharge as needed  - Consider post-discharge preferences of patient/family/discharge partner  - Complete POLST form as appropriate  - Assess patient's ability to be responsible for managing  their own health  - Refer to Case Management Department for coordinating discharge planning if the patient needs post-hospital services based on physician/LIP order or complex needs related to functional status, cognitive ability or social support system  Outcome: Progressing     Problem: Altered Communication/Language Barrier  Goal: Patient/Family is able to understand and participate in their care  Description: Interventions:  - Assess communication ability and preferred communication style  - Implement communication aides and strategies  - Use visual cues when possible  - Listen attentively, be patient, do not interrupt  - Minimize distractions  - Allow time for understanding and response  - Establish method for patient to ask for assistance (call light)  - Provide an  as needed  - Communicate barriers and strategies to overcome with those who interact with patient  Outcome: Progressing     Problem: GASTROINTESTINAL - ADULT  Goal: Minimal or absence of nausea and vomiting  Description: INTERVENTIONS:  - Maintain adequate hydration with IV or PO as ordered and tolerated  - Nasogastric tube to low intermittent suction as ordered  - Evaluate effectiveness of ordered antiemetic medications  - Provide nonpharmacologic comfort measures as appropriate  - Advance diet as tolerated, if ordered  - Obtain nutritional consult as needed  - Evaluate fluid balance  Outcome: Progressing  Goal: Maintains or returns to baseline bowel function  Description: INTERVENTIONS:  - Assess bowel function  - Maintain adequate hydration with IV or PO as ordered and tolerated  - Evaluate effectiveness of GI medications  - Encourage mobilization and activity  - Obtain nutritional consult as needed  - Establish a toileting routine/schedule  - Consider collaborating with pharmacy to review patient's medication profile  Outcome: Progressing  Goal: Maintains adequate nutritional intake (undernourished)  Description: INTERVENTIONS:  -  Monitor percentage of each meal consumed  - Identify factors contributing to decreased intake, treat as appropriate  - Assist with meals as needed  - Monitor I&O, WT and lab values  - Obtain nutritional consult as needed  - Optimize oral hygiene and moisture  - Encourage food from home; allow for food preferences  - Enhance eating environment  Outcome: Progressing     Problem: HEMATOLOGIC - ADULT  Goal: Maintains hematologic stability  Description: INTERVENTIONS  - Assess for signs and symptoms of bleeding or hemorrhage  - Monitor labs and vital signs for trends  - Administer supportive blood products/factors, fluids and medications as ordered and appropriate  - Administer supportive blood products/factors as ordered and appropriate  Outcome: Progressing  Goal: Free from bleeding injury  Description: (Example usage: patient with low platelets)  INTERVENTIONS:  - Avoid intramuscular injections, enemas and rectal medication administration  - Ensure safe mobilization of patient  - Hold pressure on venipuncture sites to achieve adequate hemostasis  - Assess for signs and symptoms of internal bleeding  - Monitor lab trends  - Patient is to report abnormal signs of bleeding to staff  - Avoid use of toothpicks and dental floss  - Use electric shaver for shaving  - Use soft bristle tooth brush  - Limit straining and forceful nose blowing  Outcome: Progressing

## 2024-03-07 NOTE — CM/SW NOTE
KARIE followed up on DC planning.     KARIE discussed with RN during rounds on having pt discharge home rather than waiting in the hospital for the hospice meeting tomorrow     RN spoke with MD who is agreeable and family is agreeable as well    Would like transport home - KARIE secured a medicar for transport home    PCS from completed    [facility] information in aidin    PLAN: Home , poss [facility] upon Dc    Darby French, [place], MSW ext. [phone]

## 2024-03-07 NOTE — PLAN OF CARE
Patient discharged home with home hospice care via [facility]. Patient and family verbalized understanding of discharge instructions prior to leaving. All questions were answered. IV removed before leaving.   Problem: Patient Centered Care  Goal: Patient preferences are identified and integrated in the patient's plan of care  Description: Interventions:  - What would you like us to know as we care for you? From home with family and they are his support system.  Patient has Stage 4 Pancreatic CA that had metastasize on his liver.   - Provide timely, complete, and accurate information to patient/family  - Incorporate patient and family knowledge, values, beliefs, and cultural backgrounds into the planning and delivery of care  - Encourage patient/family to participate in care and decision-making at the level they choose  - Honor patient and family perspectives and choices  Outcome: Adequate for Discharge     Problem: Patient/Family Goals  Goal: Patient/Family Long Term Goal  Description: Patient's Long Term Goal: Will continue to regain strength and pain will be controlled with oral medication.    Interventions:  - Check Calcium level per PCP order.  - Hospice/comfort care.  - Administer pain medication as ordered.  - Monitor for blood stools and refer as needed.  - Manage weight and nutrition.   - See additional Care Plan goals for specific interventions  Outcome: Adequate for Discharge  Goal: Patient/Family Short Term Goal  Description: Patient's Short Term Goal: Home with hospice care    Interventions:   - Check Calcium level per PCP order.  - Hospice/comfort care.  - Administer pain medication as ordered.  - Monitor for blood stools and refer as needed.  - Manage weight and nutrition.   - Heparin subcutaneous to prevent blood clots.  - PT/OT as ordered.  - See additional Care Plan goals for specific interventions  Outcome: Adequate for Discharge     Problem: PAIN - ADULT  Goal: Verbalizes/displays adequate comfort  level or patient's stated pain goal  Description: INTERVENTIONS:  - Encourage pt to monitor pain and request assistance  - Assess pain using appropriate pain scale  - Administer analgesics based on type and severity of pain and evaluate response  - Implement non-pharmacological measures as appropriate and evaluate response  - Consider cultural and social influences on pain and pain management  - Manage/alleviate anxiety  - Utilize distraction and/or relaxation techniques  - Monitor for opioid side effects  - Notify MD/LIP if interventions unsuccessful or patient reports new pain  - Anticipate increased pain with activity and pre-medicate as appropriate  Outcome: Adequate for Discharge     Problem: SAFETY ADULT - FALL  Goal: Free from fall injury  Description: INTERVENTIONS:  - Assess pt frequently for physical needs  - Identify cognitive and physical deficits and behaviors that affect risk of falls.  - [place] fall precautions as indicated by assessment.  - Educate pt/family on patient safety including physical limitations  - Instruct pt to call for assistance with activity based on assessment  - Modify environment to reduce risk of injury  - Provide assistive devices as appropriate  - Consider OT/PT consult to assist with strengthening/mobility  - Encourage toileting schedule  Outcome: Adequate for Discharge     Problem: DISCHARGE PLANNING  Goal: Discharge to home or other facility with appropriate resources  Description: INTERVENTIONS:  - Identify barriers to discharge w/pt and caregiver  - Include patient/family/discharge partner in discharge planning  - Arrange for needed discharge resources and transportation as appropriate  - Identify discharge learning needs (meds, wound care, etc)  - Arrange for interpreters to assist at discharge as needed  - Consider post-discharge preferences of patient/family/discharge partner  - Complete POLST form as appropriate  - Assess patient's ability to be responsible for  managing their own health  - Refer to Case Management Department for coordinating discharge planning if the patient needs post-hospital services based on physician/LIP order or complex needs related to functional status, cognitive ability or social support system  Outcome: Adequate for Discharge     Problem: Altered Communication/Language Barrier  Goal: Patient/Family is able to understand and participate in their care  Description: Interventions:  - Assess communication ability and preferred communication style  - Implement communication aides and strategies  - Use visual cues when possible  - Listen attentively, be patient, do not interrupt  - Minimize distractions  - Allow time for understanding and response  - Establish method for patient to ask for assistance (call light)  - Provide an  as needed  - Communicate barriers and strategies to overcome with those who interact with patient  Outcome: Adequate for Discharge     Problem: GASTROINTESTINAL - ADULT  Goal: Minimal or absence of nausea and vomiting  Description: INTERVENTIONS:  - Maintain adequate hydration with IV or PO as ordered and tolerated  - Nasogastric tube to low intermittent suction as ordered  - Evaluate effectiveness of ordered antiemetic medications  - Provide nonpharmacologic comfort measures as appropriate  - Advance diet as tolerated, if ordered  - Obtain nutritional consult as needed  - Evaluate fluid balance  Outcome: Adequate for Discharge  Goal: Maintains or returns to baseline bowel function  Description: INTERVENTIONS:  - Assess bowel function  - Maintain adequate hydration with IV or PO as ordered and tolerated  - Evaluate effectiveness of GI medications  - Encourage mobilization and activity  - Obtain nutritional consult as needed  - Establish a toileting routine/schedule  - Consider collaborating with pharmacy to review patient's medication profile  Outcome: Adequate for Discharge  Goal: Maintains adequate nutritional  intake (undernourished)  Description: INTERVENTIONS:  - Monitor percentage of each meal consumed  - Identify factors contributing to decreased intake, treat as appropriate  - Assist with meals as needed  - Monitor I&O, WT and lab values  - Obtain nutritional consult as needed  - Optimize oral hygiene and moisture  - Encourage food from home; allow for food preferences  - Enhance eating environment  Outcome: Adequate for Discharge     Problem: HEMATOLOGIC - ADULT  Goal: Maintains hematologic stability  Description: INTERVENTIONS  - Assess for signs and symptoms of bleeding or hemorrhage  - Monitor labs and vital signs for trends  - Administer supportive blood products/factors, fluids and medications as ordered and appropriate  - Administer supportive blood products/factors as ordered and appropriate  Outcome: Adequate for Discharge  Goal: Free from bleeding injury  Description: (Example usage: patient with low platelets)  INTERVENTIONS:  - Avoid intramuscular injections, enemas and rectal medication administration  - Ensure safe mobilization of patient  - Hold pressure on venipuncture sites to achieve adequate hemostasis  - Assess for signs and symptoms of internal bleeding  - Monitor lab trends  - Patient is to report abnormal signs of bleeding to staff  - Avoid use of toothpicks and dental floss  - Use electric shaver for shaving  - Use soft bristle tooth brush  - Limit straining and forceful nose blowing  Outcome: Adequate for Discharge

## 2024-03-08 NOTE — CDS QUERY
Malnutrition  CLINICAL DOCUMENTATION CLARIFICATION FORM    Dear Dr. Schuler,   Clinical information (provided below) includes documentation of Severe Malnutrition by the Clinical Dietician.  PLEASE INDICATE IF YOU ARE IN AGREEMENT WITH THE FOLLOWING   ASSESSMENT BY THE CONSULTANT:  Severe Malnutrition as documented by the Clinical Dietician on : 3/6/24  [ x ] Yes, I agree with this assessment      [  ] No, I do not agree with this assessment  If not in agreement with this assessment, please provide your independent assessment of the nutritional status:  ____________________________        Documentation from the Medical Record:    Dietician Nutritional Assessment: 3/6/2024: Pt meets severe malnutrition criteria.    CRITERIA FOR MALNUTRITION DIAGNOSIS: Criteria for severe malnutrition diagnosis: acute illness/injury related to wt loss greater than 7.5% in 3 months, energy intake less than 50% for greater than 5 days, and body fat moderate depletion.     Nutrition Diagnosis/Problem: Severe Malnutrition related to Acute - Physiological causes resulting in anorexia or diminished intake 2/2 stage 4 pancreatic cancer metastasis as evidenced by wt loss greater than 7.5% in 3 months, energy intake less than 50% for greater than 5 days, and body fat moderate depletion      Nutrition Focused Physical Exam (NFPE): moderate depletion body fat triceps region and moderate depletion muscle mass temple region and clavicle region      For questions regarding this query, please contact Clinical Documentation :   [email or web address] [phone]  Thank You  THIS FORM IS A PERMANENT PART OF THE MEDICAL RECORD

## 2024-03-09 LAB — PTH-RELATED PEPTIDE: 5.7 PMOL/L

## 2024-03-09 NOTE — DISCHARGE SUMMARY
[facility]  part of [facility]    Discharge Summary    Teofilo Espinoza Patient Status:  Inpatient    1935 MRN [id number]   Location [facility] 4W/SW/SE Attending No att. providers found   Hosp Day # 3 PCP JOCELINE MEJÍA, MD JAYLA     Date of Admission: 3/4/2024 Disposition: Hospice/Home     Date of Discharge: 3/7/24    Admitting Diagnosis: Hypercalcemia [E83.52]  Malignant neoplasm of pancreas, unspecified location of malignancy (HCC) [C25.9]    Hospital Discharge Diagnoses:  as above     Hospital Discharge Diagnoses:  as above     Lace+ Score: 73  59-90 High Risk  29-58 Medium Risk  0-28   Low Risk.    TCM Follow-Up Recommendation:  LACE > 58: High Risk of readmission after discharge from the hospital.          Lace+ Score: 73  59-90 High Risk  29-58 Medium Risk  0-28   Low Risk    Risk of readmission: Teofilo Espinoza has High Risk of readmission after discharge from the hospital.    Problem List:   Patient Active Problem List   Diagnosis    Gastrointestinal hemorrhage    Gastrointestinal hemorrhage, unspecified gastrointestinal hemorrhage type    Adenomatous polyp of colon    Iron deficiency anemia    Syncope and collapse    Transient alteration of awareness    Pneumonia due to COVID-19 virus    Hypoxia    Pancreatic cancer metastasized to liver (HCC)    Hypercalcemia    Malignant neoplasm of pancreas, unspecified location of malignancy (HCC)    Palliative care by specialist    Rectal bleeding       Reason for Admission:     Physical Exam:   General appearance: alert, appears stated age and cooperative  Pulmonary:  clear to auscultation bilaterally  Cardiovascular: S1, S2 normal, no murmur, click, rub or gallop, regular rate and rhythm  Abdominal: soft, non-tender; bowel sounds normal; no masses,  no organomegaly  Extremities: extremities normal, atraumatic, no cyanosis or edema  Psychiatric: calm        History of Present Illness:     CHIEF COMPLAINT:  Dehydration,  hypercalcemia, nausea, and vomiting.     HISTORY OF PRESENT ILLNESS:  The patient is an 88-year-old  male with known metastatic pancreatic cancer, has declined systemic chemotherapy after evaluation by Oncology.  Today, he presented to the emergency department for evaluation of poor appetite, nausea, and vomiting for the last week.  Chemistry today showed calcium 13.4, GFR 45, which is at baseline, potassium 3.2, BUN and creatinine of 28 and 1.50.  Urinalysis showed no clear evidence of urinary tract infection, positive ketones suggestive of dehydration.  CBC showed white blood cell count of 10.8 with left shift, hemoglobin stable at 13.3.  CT scan of the abdomen showed no acute findings, increased pancreatic tail lesion and enlarging bilobar hepatic metastases, stable crescent-shaped enhancing mass along the posterior wall of the low rectum, new small volume ascites.  PTH was ordered, which is still pending.  The patient was started on IV fluids.  He had a bladder scan in the emergency room which showed almost empty urinary bladder.         Hospital Course:     Assessment & Plan:   Acute renal failure from  Dehydration. better  2.       Hypercalcemia creat cl improved. Will give zometa; check vit d level;pthrp; tsh no obvious bone mets; recheck dixie today   3.       Metastatic pancreatic cancer. Dr martinez to see  4.       Essential hypertension.  5.       Eol dw family to consider dnar/pall care/hospice will discuss among themselves; dr thorpe came but I did not call him, perhaps dr martinez did  6.       Rectal mass with brbpr: contacted dr burgos     Complex mdm; coordinating care with nurse/dr walker and counseling pt and with his permission his family in room about high calc/grim prognosis                     Toña Schuler,            Chart reviewed, including current vitals, notes, labs and imaging  Labs ordered and medications adjusted as outlined above  Coordinate care with care  team/consultants  Discussed with patient results of tests, management plan as outlined above, and the need for ongoing hospitalization  D/w RN     [facility]           3/6/2024     Consultations: Dr Luna , Dr Corarles     Procedures: none     Complications: none    Discharge Condition: Good    Discharge Medications:      Discharge Medications        START taking these medications        Instructions Prescription details   traMADol 50 MG Tabs  Commonly known as: Ultram      Take 0.5 tablets (25 mg total) by mouth every 6 (six) hours as needed.   Quantity: 30 tablet  Refills: 0            CONTINUE taking these medications        Instructions Prescription details   acetaminophen 325 MG Tabs  Commonly known as: Tylenol      Take 2 tablets (650 mg total) by mouth every 6 (six) hours as needed.   Quantity:    Refills: 0     ALPRAZolam 0.5 MG Tabs  Commonly known as: Xanax      Take 1 tablet (0.5 mg total) by mouth 3 (three) times daily as needed for Sleep.   Quantity: 20 tablet  Refills: 0     amLODIPine 5 MG Tabs  Commonly known as: Norvasc      Take 1 tablet (5 mg total) by mouth daily.   Refills: 0     atorvastatin 10 MG Tabs  Commonly known as: Lipitor      Take 1 tablet (10 mg total) by mouth daily.   Refills: 0     hydroCHLOROthiazide 12.5 MG Tabs  Commonly known as: HYDRODIURIL      Take 1 tablet (12.5 mg total) by mouth daily.   Refills: 0     losartan 100 MG Tabs  Commonly known as: Cozaar      Take 1 tablet (100 mg total) by mouth daily.   Refills: 0     Omeprazole 40 MG Cpdr      Take 1 capsule (40 mg total) by mouth daily as needed (before meals). BEFORE A MEAL   Refills: 0     polyethylene glycol (PEG 3350) 17 g Pack  Commonly known as: Miralax      Take 17 g by mouth daily.   Quantity: 30 each  Refills: 0     zolpidem 10 MG Tabs  Commonly known as: Ambien      Take 1 tablet (10 mg total) by mouth nightly.   Refills: 0            STOP taking these medications      tamsulosin 0.4 MG Caps  Commonly known as:  Flomax                  Where to Get Your Medications        These medications were sent to [id number] - [place], [place] - [address] AT [facility], [phone], [phone]  [address], [facility] [address]      Hours: 24-hours Phone: [phone]   traMADol 50 MG Tabs         Follow up Visits: Follow-up with pcp  in 1 week    Follow up Labs: none     Other Discharge Instructions: none    Toña Schuler DO  3/9/2024  5:45 PM    > 35 min

## 2024-03-11 ENCOUNTER — APPOINTMENT (OUTPATIENT)
Dept: HEMATOLOGY/ONCOLOGY | Facility: HOSPITAL | Age: 89
End: 2024-03-11
Attending: INTERNAL MEDICINE
Payer: MEDICARE

## (undated) DIAGNOSIS — N50.9 SCROTAL LESION: Primary | ICD-10-CM

## (undated) DIAGNOSIS — D12.8 ADENOMATOUS RECTAL POLYP: Primary | ICD-10-CM

## (undated) DIAGNOSIS — N50.9 SCROTAL LESION: ICD-10-CM

## (undated) DIAGNOSIS — D12.6 ADENOMATOUS POLYP OF COLON, UNSPECIFIED PART OF COLON: Primary | ICD-10-CM

## (undated) DIAGNOSIS — D12.8 ADENOMATOUS RECTAL POLYP: ICD-10-CM

## (undated) DEVICE — CONMED SCOPE SAVER BITE BLOCK, 20X27 MM: Brand: SCOPE SAVER

## (undated) DEVICE — FORCEP RADIAL JAW 4

## (undated) DEVICE — Device: Brand: CUSTOM PROCEDURE KIT

## (undated) DEVICE — SNARE ENDOSCOPIC 10MM ROUND

## (undated) DEVICE — Device: Brand: DEFENDO AIR/WATER/SUCTION AND BIOPSY VALVE

## (undated) DEVICE — 35 ML SYRINGE REGULAR TIP: Brand: MONOJECT

## (undated) DEVICE — LINE MNTR ADLT SET O2 INTMD

## (undated) NOTE — IP AVS SNAPSHOT
[facility]            (For Outpatient Use Only) Initial Admit Date: 12/23/2020   Inpt/Obs Admit Date: Inpt: 12/23/20 / Obs: N/A   Discharge Date:    Ida Flower:  [de-identified]   MRN: [de-identified]   CSN: [id number]   CEID: [id number]        E Hospital Account Financial Class: Medicare Advantage    December 31, 2020

## (undated) NOTE — LETTER
Hospital Discharge Documentation  Patient was discharged to [facility].      From: [address] Hospitalist's Office  Phone: [phone]    Patient discharged time/date: 12/31/2020  5:55 PM  Patient discharge disposition:  SNF       Discharge Summary Physical Exam[JH.1]   Blood pressure 151/88, pulse 87, temperature 97.8 °F (36.6 °C), temperature source Axillary, resp. rate 18, height 6' (1.829 m), weight 187 lb 12.8 oz (85.2 kg), SpO2 97 %. [JH.2]  General:  Alert, no distress  HEENT:  Normocephalic, Refills: 0     ALPRAZolam 0.5 MG Tabs  Commonly known as: XANAX      Take 1 tablet (0.5 mg total) by mouth 3 (three) times daily as needed for Sleep.    Quantity: 20 tablet  Refills: 0     amLODIPine Besylate 5 MG Tabs  Commonly known as: NORVASC      Take

## (undated) NOTE — LETTER
1/30/2020          To Whom It May Concern:    Leona was hospitalized from 1/23/2020-1/30/2020. During this time his family needed to be present to care for him. If you require additional information please contact our office.         Daniella Jacobo

## (undated) NOTE — LETTER
[facility]  Administration of Anesthesia  1.  Leona Sheehan, or _________________________________ acting on his behalf, (Patient) (Dependent/Representative) request to receive anesthesia for my pending procedure/operation/treatme infections, high spinal block, spinal bleeding, seizure, cardiac arrest and death. 7. AWARENESS: I understand that it is possible (but unlikely) to have explicit memory of events from the operating room while under general anesthesia.   8. ELECTROCONVULSIV unconscious pt /Relationship    My signature below affirms that prior to the time of the procedure, I have explained to the patient and/or his/her guardian, the risks and benefits of undergoing anesthesia, as well as any reasonable alternatives.     _______

## (undated) NOTE — IP AVS SNAPSHOT
Patient Demographics     Address  [address] [place] [address] Street Phone  [phone] [facility])  [phone] [facility] E-mail Address  [email or web address]      Emergency Contact(s)     Name Relation Home Work Soheila  [phone] Commonly known as: LOVENOX  Start taking on: January 1, 2021  Next dose due: Tomorrow morning      Inject 0.4 mL (40 mg total) into the skin daily.    Spencer Valencia MD         famoTIDine 20 MG Tabs  Commonly known as: PEPCID  Start taking on: January 1, 2021  N [id number] melatonin tab TABS 1 mg 12/30/20 2127 Given      [id number] multivitamin with minerals (ADULT) tab 1 tablet 12/31/20 0938 Given      [id number] tamsulosin HCl (FLOMAX) cap 0.4 mg 12/31/20 0938 Given      [id number] vancomycin HCl (VANCOCIN) cap 125 GFR, -American 80 >=60 [facility]            D-DIMER [[id number]] (Abnormal)  Resulted: 12/31/20 0541, Result status: Final result   Ordering provider: Kenn Heath MD  12/30/20 2300 Resulting lab: [facility] Procedure Component Value Units Date/Time    Blood Culture FREQ X 2 [[id number]] Collected: 12/23/20 1755    Order Status: Completed Lab Status: Final result Updated: 12/28/20 1900    Specimen: Blood,peripheral      Blood Culture Result No Growth 5 Days PAST MEDICAL HISTORY:  Anemia with negative colonoscopy for bleed. Prior colonoscopy showed diverticulosis and internal hemorrhoids. He had history of prostate cancer treated with CyberKnife and seed implants.   Recurrent diverticular gastrointestinal ble 1. COVID-19 pneumonia and hypoxemia. 2.   History of hypertension. The patient will be admitted to general medical floor. IV Decadron. ID and Pulmonary consult to evaluate patient for remdesivir and convalescent plasma.   Monitor his hemoglobin le Leona is a a(n) 82year old male.  Patient is a 72-year-old male with a history of prostate cancer status post radiation and brachytherapy, HTN, HLD, aortic stenosis now presents to hospital with cough, shortness of breath, fevers, myalgias, •  Metoclopramide HCl (REGLAN) injection 10 mg, 10 mg, Intravenous, Q8H PRN  •  [START ON 12/25/2020] remdesivir 100 mg in sodium chloride 0.9% 250 mL IVPB, 100 mg, Intravenous, Q24H    Review of Systems:  CONSTITUTIONAL:  No weight loss,[VD.1] +[VD. 2]weak 53-year-old male with a history of prostate cancer status post radiation and brachytherapy, HTN, HLD, aortic stenosis now presents to hospital with cough, shortness of breath, fevers, myalgias, decreased appetite, congestion with symptoms starting about 8 Discharge Summary signed by Crystal Forrest MD at 12/31/2020  2:09 PM  Version 1 of 1    Author: Crystal Forrest MD Service: Hospitalist Author Type: Physician    Filed: 12/31/2020  2:09 PM Date of Service: 12/31/2020  2:07 PM Status: Signed    : Crystal Forrest MD Neck:  Supple, symmetrical  Cardiac:  Regular rate, regular rhythm  Pulmonary:  Clear to auscultation bilaterally, respirations unlabored  Gastrointestinal:  Soft, non-tender, normal bowel sounds  Musculoskeletal:  No joint swelling  Extremities:  No edema Take 5 mg by mouth daily. Refills: 0     atorvastatin 10 MG Tabs  Commonly known as: LIPITOR      Take 10 mg by mouth daily.    Refills: 0     docusate sodium 100 MG Caps  Commonly known as: COLACE      Take 100 mg by mouth 2 (two) times daily as needed Author: Shannan Amanda PT Service: Physical Medicine and Rehabilitation Author Type: Physical Therapist    Filed: 12/31/2020  2:52 PM Date of Service: 12/31/2020 10:41 AM Status: Signed    : Shannan Amanda PT (Physical Therapist)         Chiqui Caal The patient's Approx Degree of Impairment: 54.16% has been calculated based on documentation in the [facility] '6 clicks' Inpatient Basic Mobility Short Form.   Research supports that patients with this level of impairment may benefit from sub-acute rehab to opt -   Climbing 3-5 steps with a railing?: A Lot     AM-PAC Score:  Raw Score: 16   Approx Degree of Impairment: 54.16%   Standardized Score (AM-PAC Scale): 40.78   CMS Modifier (G-Code): CK    FUNCTIONAL ABILITY STATUS  Gait Assessment   Gait Assistance: Min PHYSICAL THERAPY TREATMENT NOTE - INPATIENT     Room Number: 862/590-C       Presenting Problem: (+COVID 19)    Problem List  Principal Problem:    Pneumonia due to COVID-19 virus  Active Problems:    Hypoxia      PHYSICAL THERAPY ASSESSMENT   During this PT Treatment Plan: Bed mobility; Body mechanics; Endurance; Patient education;Gait training;Strengthening;Transfer training;Balance training;Energy conservation    SUBJECTIVE  \"Now, I feel dizzy\"    OBJECTIVE  Precautions: Bed/chair alarm;  needed Pattern: Shuffle(decreased randal)  Stoop/Curb Assistance: Not tested  Comment : -      Patient End of Session: Up in chair;Needs met;Call light within reach;RN aware of session/findings; Alarm set    CURRENT GOALS   Goals to be met by: 1/7/21  Patient [facility] During this session the following PPE was worn by this therapist: sx mask + N95, gloves, goggles, gown. Patient donned mask during activity. [KH.1]     1L/min   · At rest: 89%  · With activity: 93% 117bpm  · RUE  146/76   [KH. 2]  RN cleared pt for participa OT Treatment Plan: Balance activities; Energy conservation/work simplification techniques;ADL training;Functional transfer training; Endurance training;Patient/Family education;Patient/Family training;Equipment eval/education; Compensatory technique education  Patient will tolerate standing for 3 minutes in prep for adls with min a    Comment: ongoing                Goals  on:  1/10/21  Frequency: 3-5x a week[KH.1]                Attribution Clemons    KH. 1 - [place] OT on 2020  2:52 PM Pt completed ADLs and functional mobility with up to Mod A during this session.  To assess pt's safe participation during daily tasks while also providing intermittent education to maximize safety and participation, therapist facilitated the following:  · B -   Putting on and taking off regular lower body clothing?: A Lot  -   Bathing (including washing, rinsing, drying)?: A Lot  -   Toileting, which includes using toilet, bedpan or urinal? : A Lot  -   Putting on and taking off regular upper body clothing?: Filed: 12/30/2020  1:40 PM Date of Service: 12/30/2020  1:01 PM Status: Signed    : Kylee Adler SLP (SPEECH-LANGUAGE PATHOLOGIST)     Summary: Recommend upgrade to ground/NECTAR-THICK liquids via open cup/no straw. Will f/u 1-2x.         SP 2. Extensive multifocal airspace disease in a pattern consistent with atypical infectious/viral pneumonia. 3. 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  There is focal lobulation along the superolateral aortic contour which may refl Goal #2 The patient will tolerate trial upgrade of CHOPPED/REGULAR consistency and NTL via cup and thin liquids without overt signs or symptoms of aspiration with 100 % accuracy over 2-3 session(s). Increased mastication time/effort on chopped trials.  Davy Whittington - See additional Care Plan goals for specific interventions   Patient/Family Short Term Goal     Interdisciplinary Progressing    Description: Patient's Short Term Goal: to breathe better    Interventions:   - high flow oxygen, proning, deep breathing and

## (undated) NOTE — LETTER
Hospital Discharge Documentation  Please phone to schedule a hospital follow up appointment [facility] referral upon Discharge. No discharge summary available at this time, below is the most recent progress note  for your review .        From: [facility] Hospitalist's Office  Phone: [phone]    Patient discharged time/date: 3/7/2024  4:30 PM  Patient discharge disposition:  Hospice/Home    D/C Summary    No notes of this type exist for this encounter.       [facility]  part of [facility]     Progress Note           Teofilo Espinoza Patient Status:  Inpatient    1935 MRN [id number]   Location [facility] 4W/SW/SE Attending Toña Schuler, DO   Hosp Day # 2 PCP JOCELINE MEJÍA, MD JAYLA      Chief complaint bleeding      Subjective:   Teofilo Espinoza is a(n) 88 year old male Pt seen earlier today. Doing well. No c/o's. Asking about when he will be discharged      ROS:   No cp, sob   No c/d   No n/v      Objective:   Blood pressure (!) 151/94, pulse 118, temperature 98.1 °F (36.7 °C), temperature source Oral, resp. rate 12, height 6' (1.829 m), weight 164 lb 11.2 oz (74.7 kg), SpO2 100%.        Intake/Output Summary (Last 24 hours) at 3/6/2024 1806  Last data filed at 3/6/2024 1420      Gross per 24 hour   Intake 120 ml   Output 450 ml   Net -330 ml         Patient Weight(s) for the past 336 hrs:    Weight   24 1854 164 lb 11.2 oz (74.7 kg)               General appearance: alert, appears stated age and cooperative  Pulmonary:  clear to auscultation bilaterally  Cardiovascular: S1, S2 normal, no murmur, click, rub or gallop, regular rate and rhythm  Abdominal: soft, non-tender; bowel sounds normal; no masses,  no organomegaly  Extremities: extremities normal, atraumatic, no cyanosis or edema           Medicines:             Current Facility-Administered Medications   Medication Dose Route Frequency    potassium chloride 20 mEq/100mL IVPB premix 20 mEq  20  mEq Intravenous Once    metoprolol tartrate (Lopressor) partial tab 12.5 mg  12.5 mg Oral 2x Daily(Beta Blocker)    traMADol (Ultram) tab 25 mg  25 mg Oral Q6H PRN    metoclopramide (Reglan) 5 mg/mL injection 5 mg  5 mg Intravenous TID AC and HS    morphINE PF 2 MG/ML injection 1 mg  1 mg Intravenous Q2H PRN     Or    morphINE PF 2 MG/ML injection 2 mg  2 mg Intravenous Q2H PRN     Or    morphINE PF 4 MG/ML injection 4 mg  4 mg Intravenous Q2H PRN    morphINE PF 4 MG/ML injection 4 mg  4 mg Intravenous Once    sodium chloride 0.9% infusion   Intravenous Continuous    acetaminophen (Tylenol Extra Strength) tab 500 mg  500 mg Oral Q4H PRN    ondansetron (Zofran) 4 MG/2ML injection 4 mg  4 mg Intravenous Q6H PRN    hydrALAzine (Apresoline) 20 mg/mL injection 10 mg  10 mg Intravenous Q4H PRN    ALPRAZolam (Xanax) tab 0.5 mg  0.5 mg Oral TID PRN    zolpidem (Ambien) tab 10 mg  10 mg Oral Nightly    amLODIPine (Norvasc) tab 5 mg  5 mg Oral Daily    atorvastatin (Lipitor) tab 10 mg  10 mg Oral Daily    losartan (Cozaar) tab 100 mg  100 mg Oral Daily    pantoprazole (Protonix) DR tab 40 mg  40 mg Oral QAM AC               Lab Results   Component Value Date     WBC 9.3 03/06/2024     HGB 12.1 (L) 03/06/2024     HCT 35.3 (L) 03/06/2024     .0 03/06/2024     CREATSERUM 1.14 03/06/2024     BUN 31 (H) 03/06/2024      03/06/2024     K 3.4 (L) 03/06/2024      03/06/2024     CO2 29.0 03/06/2024     GLU 91 03/06/2024     CA 11.6 (H) 03/06/2024     ALB 3.3 03/06/2024     ALKPHO 142 (H) 03/06/2024     BILT 0.8 03/06/2024     TP 5.7 03/06/2024     AST 58 (H) 03/06/2024     ALT 22 03/06/2024     PTT 33.6 08/15/2023     INR 1.12 08/15/2023     T4F 1.2 03/05/2024     TSH 1.542 03/05/2024     LIP 42 03/04/2024     PSA 0.02 08/15/2023     DDIMER 1.60 (H) 12/31/2020     CRP 6.71 (H) 12/26/2020     MG 1.8 03/06/2024     PHOS 2.3 (L) 03/06/2024     TROP 0.116 (HH) 12/23/2020      (H) 12/23/2020         No results  found.         Results:      CBC:          Lab Results   Component Value Date     WBC 9.3 03/06/2024     WBC 10.3 03/05/2024     WBC 9.4 03/05/2024            Lab Results   Component Value Date     HGB 12.1 (L) 03/06/2024     HGB 12.2 (L) 03/05/2024     HGB 11.7 (L) 03/05/2024            Lab Results   Component Value Date     .0 03/06/2024     .0 03/05/2024     .0 03/05/2024                Recent Labs   Lab 03/04/24  1140 03/05/24  0432 03/05/24  1726 03/06/24  0457   * 77  --  91   BUN 28* 30*  --  31*   CREATSERUM 1.50* 1.25  --  1.14   CA 13.4* 12.4* 11.9* 11.6*    142  --  144   K 3.2* 3.5  3.5  --  3.4*    107  --  112   CO2 31.0 29.0  --  29.0                  Assessment and Plan:            Assessment & Plan:   Acute renal failure from  Dehydration. better  2.       Hypercalcemia creat cl improved. Will give zometa; check vit d level;pthrp; tsh no obvious bone mets; recheck dixie today   3.       Metastatic pancreatic cancer. Dr martinez to see  4.       Essential hypertension.  5.       Eol dw family to consider dnar/pall care/hospice will discuss among themselves; dr thorpe came but I did not call him, perhaps dr martinez did  6.       Rectal mass with brbpr: contacted dr burgos     Complex mdm; coordinating care with nurse/dr walker and counseling pt and with his permission his family in room about high calc/grim prognosis                     Toña Schuler DO           Chart reviewed, including current vitals, notes, labs and imaging  Labs ordered and medications adjusted as outlined above  Coordinate care with care team/consultants  Discussed with patient results of tests, management plan as outlined above, and the need for ongoing hospitalization  D/w RN     [facility]           3/6/2024                               Electronically signed by Toña Schuler DO at 3/6/2024  6:07 PM